# Patient Record
Sex: FEMALE | Race: WHITE | NOT HISPANIC OR LATINO
[De-identification: names, ages, dates, MRNs, and addresses within clinical notes are randomized per-mention and may not be internally consistent; named-entity substitution may affect disease eponyms.]

---

## 2017-01-27 ENCOUNTER — APPOINTMENT (OUTPATIENT)
Dept: SLEEP CENTER | Facility: CLINIC | Age: 37
End: 2017-01-27

## 2017-05-02 ENCOUNTER — APPOINTMENT (OUTPATIENT)
Dept: PULMONOLOGY | Facility: CLINIC | Age: 37
End: 2017-05-02

## 2017-05-02 VITALS
WEIGHT: 199 LBS | BODY MASS INDEX: 36.62 KG/M2 | HEART RATE: 108 BPM | RESPIRATION RATE: 17 BRPM | SYSTOLIC BLOOD PRESSURE: 110 MMHG | DIASTOLIC BLOOD PRESSURE: 70 MMHG | HEIGHT: 62 IN | OXYGEN SATURATION: 98 %

## 2017-05-02 DIAGNOSIS — Z72.820 SLEEP DEPRIVATION: ICD-10-CM

## 2017-11-07 ENCOUNTER — APPOINTMENT (OUTPATIENT)
Dept: PULMONOLOGY | Facility: CLINIC | Age: 37
End: 2017-11-07

## 2017-11-19 ENCOUNTER — RX RENEWAL (OUTPATIENT)
Age: 37
End: 2017-11-19

## 2018-03-02 ENCOUNTER — TRANSCRIPTION ENCOUNTER (OUTPATIENT)
Age: 38
End: 2018-03-02

## 2018-07-10 ENCOUNTER — TRANSCRIPTION ENCOUNTER (OUTPATIENT)
Age: 38
End: 2018-07-10

## 2018-07-25 ENCOUNTER — TRANSCRIPTION ENCOUNTER (OUTPATIENT)
Age: 38
End: 2018-07-25

## 2018-10-30 ENCOUNTER — TRANSCRIPTION ENCOUNTER (OUTPATIENT)
Age: 38
End: 2018-10-30

## 2019-01-25 ENCOUNTER — TRANSCRIPTION ENCOUNTER (OUTPATIENT)
Age: 39
End: 2019-01-25

## 2019-04-02 ENCOUNTER — TRANSCRIPTION ENCOUNTER (OUTPATIENT)
Age: 39
End: 2019-04-02

## 2019-09-18 ENCOUNTER — TRANSCRIPTION ENCOUNTER (OUTPATIENT)
Age: 39
End: 2019-09-18

## 2019-09-26 ENCOUNTER — APPOINTMENT (OUTPATIENT)
Dept: PULMONOLOGY | Facility: CLINIC | Age: 39
End: 2019-09-26
Payer: COMMERCIAL

## 2019-09-26 VITALS
DIASTOLIC BLOOD PRESSURE: 82 MMHG | SYSTOLIC BLOOD PRESSURE: 136 MMHG | RESPIRATION RATE: 17 BRPM | WEIGHT: 214 LBS | OXYGEN SATURATION: 98 % | BODY MASS INDEX: 39.38 KG/M2 | HEART RATE: 102 BPM | HEIGHT: 62 IN

## 2019-09-26 PROCEDURE — 99214 OFFICE O/P EST MOD 30 MIN: CPT

## 2019-10-04 NOTE — HISTORY OF PRESENT ILLNESS
[FreeTextEntry1] : Ms. Champagne is a 39 year old female presenting to the office today for an acute visit. She has PMHx of allergies, asthma, GERD, and poor sleep.\par \par She reports not feeling well 2 weeks ago- had severe sore throat and cold like symptoms.\par Her 3 year old daughter was sick in the home. \par 1 week later she felt worse with cough, post nasal drip, fever, mucus, poor sleep, chest tightness and shortness of breath. She went to an urgent care- told she had bronchitis with touch of PNA.\par She was given a zpak, benzonotate and advised to use nebulizer. She finished abx on sunday. \par \par She feels about 85% better but still has green mucus and cough. The cough still keeps her up at night despite benzonatate. She reports she is persistently clearing her throat/feels like there is mucus there. She has had some wheezing on and off. Her SOB is still present and is mainly with exertion. She is dealing with chest tightness still. \par \par Regarding her CERVANTES, she admits that with exercise she is symptomatic even when not sick. She tends to get breathy and her chest feels tight when exercising or pushing herself. \par \par She does not routinely use inhalers. She has not been using her rescue inhaler recently or with exercise.\par \par She otherwise denies heart burn, sour taste, sore throat, ear pain, n/v/abdominal pain, HA, sinus pressure or rashes.

## 2019-10-04 NOTE — ASSESSMENT
[FreeTextEntry1] : The plan for the patient is as follows:\par \par problem 1: asthmatic bronchitis with improvement\par -s/p abx\par -restart Singulair 10 mg before bed\par -Breo 200 1 puff once daily rinse and gargle (sample given to patient); she reports cost of inhalers in past were very expensive- will send in symbicort 160 2 BID once done with breo due to 12 month free coupon. \par -albuterol and ProAir PRN SOB and prior to exercise\par -advised she should be on a maintenance inhaler given her daily symptoms\par \par problem 2: allergies and sinuses-stable at this time\par -continue to use nasal saline\par -continue to use Xyzal 5 mg before bed \par -Environmental measures for allergies were encouraged including mattress and pillow cover, air purifier, and environmental controls. \par \par problem 3: GERD hx with globus complaints/LPR\par -start famotadine 40 mg PO QHS\par -Rule of 2s: avoid eating too much, eating too late, eating too spicy, eating two hours before bed\par \par problem 4: overweight\par -Weight loss, exercise, and diet control were discussed and are highly encouraged. Treatment options were given such as, aqua therapy, and contacting a nutritionist. Recommended to use the elliptical, stationary bike, less use of treadmill.  Obesity is associated with worsening asthma, shortness of breath, and potential for cardiac disease, diabetes, and other underlying medical conditions. \par \par problem 5: elevated HR\par -102 bpm today- denies recent coffee intake and is not anxious\par -prior vitals hx with elevated HR\par - recommend getting an spO2 monitor or counting her bpm especially with cardiovascular exercise; good to know baseline HR as well\par -given recent issues with HTN as well, logical to go for cardiology evaluation- Dr. Weinberg info given \par \par F/U in 2 months with [facility]\par call with update if not improving\par She is encouraged to call with any changes, concerns or questions. \par

## 2019-10-04 NOTE — REVIEW OF SYSTEMS
[Fatigue] : fatigue [Ear Disturbance] : no ear disturbance [Nasal Congestion] : no nasal congestion [Postnasal Drip] : no postnasal drip [Sore Throat] : no sore throat [Dry Mouth] : no dry mouth [Cough] : cough [Sputum] : sputum  [Dyspnea] : dyspnea [Chest Tightness] : chest tightness [Wheezing] : wheezing [As Noted in HPI] : as noted in HPI [Difficulty Initiating Sleep] : difficulty falling asleep [Nonrestorative Sleep] : nonrestorative sleep [Difficulty Maintaining Sleep] : difficulty maintaining sleep [Negative] : Cardiovascular [FreeTextEntry2] : persistent throat clearing [de-identified] : due to cough [FreeTextEntry9] : htn- controlled on meds: ramipril and amlodipine

## 2019-10-04 NOTE — PHYSICAL EXAM
[General Appearance - Well Developed] : well developed [Normal Appearance] : normal appearance [Well Groomed] : well groomed [General Appearance - Well Nourished] : well nourished [General Appearance - In No Acute Distress] : no acute distress [No Deformities] : no deformities [Normal Conjunctiva] : the conjunctiva exhibited no abnormalities [Eyelids - No Xanthelasma] : the eyelids demonstrated no xanthelasmas [Normal Oropharynx] : normal oropharynx [II] : II [Neck Appearance] : the appearance of the neck was normal [Neck Cervical Mass (___cm)] : no neck mass was observed [Jugular Venous Distention Increased] : there was no jugular-venous distention [Heart Sounds] : normal S1 and S2 [Murmurs] : no murmurs present [Respiration, Rhythm And Depth] : normal respiratory rhythm and effort [Exaggerated Use Of Accessory Muscles For Inspiration] : no accessory muscle use [Auscultation Breath Sounds / Voice Sounds] : lungs were clear to auscultation bilaterally [Abdomen Tenderness] : non-tender [Abdomen Soft] : soft [Abdomen Mass (___ Cm)] : no abdominal mass palpated [Gait - Sufficient For Exercise Testing] : the gait was sufficient for exercise testing [Abnormal Walk] : normal gait [Nail Clubbing] : no clubbing of the fingernails [Cyanosis, Localized] : no localized cyanosis [Petechial Hemorrhages (___cm)] : no petechial hemorrhages [Skin Color & Pigmentation] : normal skin color and pigmentation [] : no rash [No Focal Deficits] : no focal deficits [Oriented To Time, Place, And Person] : oriented to person, place, and time [Affect] : the affect was normal [Impaired Insight] : insight and judgment were intact [FreeTextEntry1] : I:E 1:3; Scant expiratory wheezes throughout

## 2019-10-08 ENCOUNTER — NON-APPOINTMENT (OUTPATIENT)
Age: 39
End: 2019-10-08

## 2019-10-08 ENCOUNTER — APPOINTMENT (OUTPATIENT)
Dept: CARDIOLOGY | Facility: CLINIC | Age: 39
End: 2019-10-08
Payer: COMMERCIAL

## 2019-10-08 VITALS
SYSTOLIC BLOOD PRESSURE: 144 MMHG | HEIGHT: 62 IN | BODY MASS INDEX: 38.46 KG/M2 | HEART RATE: 91 BPM | DIASTOLIC BLOOD PRESSURE: 93 MMHG | WEIGHT: 209 LBS | OXYGEN SATURATION: 15 %

## 2019-10-08 PROCEDURE — 99242 OFF/OP CONSLTJ NEW/EST SF 20: CPT

## 2019-10-08 PROCEDURE — 93000 ELECTROCARDIOGRAM COMPLETE: CPT

## 2019-10-21 ENCOUNTER — RX RENEWAL (OUTPATIENT)
Age: 39
End: 2019-10-21

## 2019-11-14 ENCOUNTER — TRANSCRIPTION ENCOUNTER (OUTPATIENT)
Age: 39
End: 2019-11-14

## 2019-11-25 ENCOUNTER — APPOINTMENT (OUTPATIENT)
Dept: CARDIOLOGY | Facility: CLINIC | Age: 39
End: 2019-11-25
Payer: COMMERCIAL

## 2019-11-25 ENCOUNTER — APPOINTMENT (OUTPATIENT)
Dept: PULMONOLOGY | Facility: CLINIC | Age: 39
End: 2019-11-25
Payer: COMMERCIAL

## 2019-11-25 VITALS
BODY MASS INDEX: 38.64 KG/M2 | HEIGHT: 62 IN | DIASTOLIC BLOOD PRESSURE: 100 MMHG | WEIGHT: 210 LBS | RESPIRATION RATE: 16 BRPM | SYSTOLIC BLOOD PRESSURE: 150 MMHG | HEART RATE: 99 BPM

## 2019-11-25 VITALS
OXYGEN SATURATION: 99 % | WEIGHT: 210 LBS | DIASTOLIC BLOOD PRESSURE: 102 MMHG | SYSTOLIC BLOOD PRESSURE: 147 MMHG | RESPIRATION RATE: 16 BRPM | HEIGHT: 62 IN | HEART RATE: 92 BPM | BODY MASS INDEX: 38.64 KG/M2

## 2019-11-25 PROCEDURE — 99214 OFFICE O/P EST MOD 30 MIN: CPT | Mod: 25

## 2019-11-25 PROCEDURE — 99213 OFFICE O/P EST LOW 20 MIN: CPT | Mod: 25

## 2019-11-25 PROCEDURE — 94060 EVALUATION OF WHEEZING: CPT

## 2019-11-25 PROCEDURE — 93015 CV STRESS TEST SUPVJ I&R: CPT

## 2019-11-25 PROCEDURE — 93306 TTE W/DOPPLER COMPLETE: CPT

## 2019-11-25 PROCEDURE — 94726 PLETHYSMOGRAPHY LUNG VOLUMES: CPT

## 2019-11-25 PROCEDURE — 94729 DIFFUSING CAPACITY: CPT

## 2019-11-25 RX ORDER — TELMISARTAN 80 MG/1
80 TABLET ORAL
Qty: 90 | Refills: 1 | Status: COMPLETED | COMMUNITY
Start: 2019-10-08 | End: 2019-11-25

## 2019-11-25 NOTE — HISTORY OF PRESENT ILLNESS
[FreeTextEntry1] : Ms. Champagne is a 39 year old female presenting to the office today for a follow up visit. She has PMHx of allergies, asthma, GERD, and poor sleep.\par \par Pt has been doing better over all with GERD symptoms and Asthma symptoms overall since starting symbicort however she continues to find herself SOB with exercise and when exerting herself during chores. \par \par She states the xopenex was not covered and she does not have a rescue inhaler. \par She feels post nasal drip and has persistent throat clearing. She feels she does have some underlying GI issues and needs to have an evaluation. She does admit to GERD like symptoms in the evening. \par She had a couple episodes of diarrhea and vomiting not attributed to illness or new foods. \par \par She states that she has a persistent annoying cough from the mucus or post nasal drip. \par \par Her BP meds have been changed since seeing Dr. Weinberg. \par She has an appointment today as well. \par She has not yet purchased a pulse oximeter. \par She otherwise denies any other complaints. \par She does not feel sick. \par She uses Flonase on and off and sometimes uses Benadryl for post nasal/sinus symptoms.

## 2019-11-25 NOTE — PHYSICAL EXAM
[Normal Appearance] : normal appearance [General Appearance - Well Developed] : well developed [Well Groomed] : well groomed [General Appearance - Well Nourished] : well nourished [No Deformities] : no deformities [General Appearance - In No Acute Distress] : no acute distress [Eyelids - No Xanthelasma] : the eyelids demonstrated no xanthelasmas [Normal Conjunctiva] : the conjunctiva exhibited no abnormalities [II] : II [Normal Oropharynx] : normal oropharynx [Neck Appearance] : the appearance of the neck was normal [Neck Cervical Mass (___cm)] : no neck mass was observed [Jugular Venous Distention Increased] : there was no jugular-venous distention [Heart Rate And Rhythm] : heart rate and rhythm were normal [Heart Sounds] : normal S1 and S2 [Murmurs] : no murmurs present [Respiration, Rhythm And Depth] : normal respiratory rhythm and effort [Auscultation Breath Sounds / Voice Sounds] : lungs were clear to auscultation bilaterally [Exaggerated Use Of Accessory Muscles For Inspiration] : no accessory muscle use [Abdomen Soft] : soft [FreeTextEntry1] : I:E 1:3;  [Abdomen Tenderness] : non-tender [Abdomen Mass (___ Cm)] : no abdominal mass palpated [Abnormal Walk] : normal gait [Gait - Sufficient For Exercise Testing] : the gait was sufficient for exercise testing [Nail Clubbing] : no clubbing of the fingernails [Petechial Hemorrhages (___cm)] : no petechial hemorrhages [Cyanosis, Localized] : no localized cyanosis [No Focal Deficits] : no focal deficits [] : no rash [Skin Color & Pigmentation] : normal skin color and pigmentation [Oriented To Time, Place, And Person] : oriented to person, place, and time [Impaired Insight] : insight and judgment were intact [Affect] : the affect was normal

## 2019-11-25 NOTE — REVIEW OF SYSTEMS
[Ear Disturbance] : no ear disturbance [Nasal Congestion] : nasal congestion [Postnasal Drip] : postnasal drip [Sore Throat] : no sore throat [Cough] : cough [Dry Mouth] : no dry mouth [Chest Tightness] : no chest tightness [Dyspnea] : dyspnea [Wheezing] : no wheezing [Indigestion] : indigestion [Reflux] : reflux [Vomiting] : vomiting [As Noted in HPI] : as noted in HPI [Abdominal Pain] : abdominal pain [Diarrhea] : diarrhea [Difficulty Initiating Sleep] : no difficulty falling asleep [Difficulty Maintaining Sleep] : no difficulty maintaining sleep [Nonrestorative Sleep] : restorative sleep [Negative] : Pulmonary Hypertension [FreeTextEntry2] : persistent throat clearing

## 2019-11-25 NOTE — ASSESSMENT
[FreeTextEntry1] : The plan for the patient is as follows:\par \par problem 1: Asthma with continues exertional symptoms\par -Singulair 10 mg before bed\par -Continue symbicort 160 2 BID rinse and gargle\par -add Spiriva respimat 2.5 2 puffs in am (sample given to patient as well)\par -try ProAir PRN SOB and prior to exercise, monitor HR, may need xopenex\par \par problem 2: post nasal drip, sinus congestion, + turbinate hypertrophy, contributing to cough\par -continue to use nasal saline\par -continue to use Xyzal 5 mg before bed \par -restart fluticasone nasal spray 2 sprays in am\par -add azelastine nasal spray 2 sprays am and pm\par \par problem 3: GERD hx with globus complaints/LPR- contributing to cough\par -add omeprazole 40 mg PO QHS\par -continue famotadine 40 mg PO QHS\par -Rule of 2s: avoid eating too much, eating too late, eating too spicy, eating two hours before bed\par \par problem 4: overweight\par -Weight loss, exercise, and diet control were discussed and are highly encouraged. Treatment options were given such as, aqua therapy, and contacting a nutritionist. Recommended to use the elliptical, stationary bike, less use of treadmill.  Obesity is associated with worsening asthma, shortness of breath, and potential for cardiac disease, diabetes, and other underlying medical conditions. \par \par problem 5: elevated HR hx; follows with Dr. Weinberg\par - recommend getting an spO2 monitor or counting her bpm especially with cardiovascular exercise; good to know baseline HR as well\par -f/u cardiology evaluation today\par \par F/U in 4-6 months\par She is encouraged to call with any changes, concerns or questions. \par

## 2019-11-25 NOTE — REASON FOR VISIT
[Acute] : an acute visit [Asthma] : asthma [Cough] : cough [Shortness of Breath] : shortness of Breath [Wheezing] : wheezing [FreeTextEntry2] : GERD

## 2019-12-23 ENCOUNTER — TRANSCRIPTION ENCOUNTER (OUTPATIENT)
Age: 39
End: 2019-12-23

## 2020-01-06 ENCOUNTER — RX RENEWAL (OUTPATIENT)
Age: 40
End: 2020-01-06

## 2020-01-08 ENCOUNTER — NON-APPOINTMENT (OUTPATIENT)
Age: 40
End: 2020-01-08

## 2020-01-08 ENCOUNTER — APPOINTMENT (OUTPATIENT)
Dept: CARDIOLOGY | Facility: CLINIC | Age: 40
End: 2020-01-08
Payer: COMMERCIAL

## 2020-01-08 VITALS
WEIGHT: 217 LBS | SYSTOLIC BLOOD PRESSURE: 130 MMHG | HEIGHT: 62 IN | BODY MASS INDEX: 39.93 KG/M2 | DIASTOLIC BLOOD PRESSURE: 90 MMHG | RESPIRATION RATE: 16 BRPM

## 2020-01-08 PROCEDURE — 99214 OFFICE O/P EST MOD 30 MIN: CPT

## 2020-01-08 RX ORDER — AMLODIPINE BESYLATE 5 MG/1
5 TABLET ORAL DAILY
Qty: 90 | Refills: 1 | Status: COMPLETED | COMMUNITY
Start: 2019-11-25 | End: 2020-01-08

## 2020-01-10 ENCOUNTER — TRANSCRIPTION ENCOUNTER (OUTPATIENT)
Age: 40
End: 2020-01-10

## 2020-01-10 RX ORDER — LEVALBUTEROL TARTRATE 45 UG/1
45 AEROSOL, METERED ORAL
Qty: 1 | Refills: 3 | Status: COMPLETED | COMMUNITY
Start: 2019-09-26 | End: 2020-01-10

## 2020-01-16 ENCOUNTER — RX RENEWAL (OUTPATIENT)
Age: 40
End: 2020-01-16

## 2020-01-29 ENCOUNTER — TRANSCRIPTION ENCOUNTER (OUTPATIENT)
Age: 40
End: 2020-01-29

## 2020-02-04 ENCOUNTER — APPOINTMENT (OUTPATIENT)
Dept: PULMONOLOGY | Facility: CLINIC | Age: 40
End: 2020-02-04
Payer: COMMERCIAL

## 2020-02-04 VITALS
RESPIRATION RATE: 17 BRPM | SYSTOLIC BLOOD PRESSURE: 120 MMHG | HEART RATE: 120 BPM | OXYGEN SATURATION: 98 % | DIASTOLIC BLOOD PRESSURE: 70 MMHG | HEIGHT: 62 IN | BODY MASS INDEX: 39.04 KG/M2 | WEIGHT: 212.13 LBS

## 2020-02-04 PROCEDURE — 71046 X-RAY EXAM CHEST 2 VIEWS: CPT

## 2020-02-04 PROCEDURE — 99214 OFFICE O/P EST MOD 30 MIN: CPT

## 2020-02-04 NOTE — REASON FOR VISIT
[Asthma] : asthma [Cough] : cough [Shortness of Breath] : shortness of Breath [Wheezing] : wheezing [Acute] : an acute visit [FreeTextEntry2] : GERD

## 2020-02-04 NOTE — REVIEW OF SYSTEMS
[Fatigue] : fatigue [Fever] : fever [Nasal Congestion] : nasal congestion [Ear Disturbance] : ear disturbance [Poor Appetite] : poor appetite [Sore Throat] : sore throat [Cough] : cough [Sputum] : sputum  [Chest Tightness] : chest tightness [Dyspnea] : dyspnea [Wheezing] : wheezing [Nausea] : nausea [Reflux] : reflux [Indigestion] : indigestion [As Noted in HPI] : as noted in HPI [Headache] : headache [Difficulty Initiating Sleep] : difficulty falling asleep [Difficulty Maintaining Sleep] : difficulty maintaining sleep [Nonrestorative Sleep] : nonrestorative sleep [Negative] : Pulmonary Hypertension [Dry Mouth] : no dry mouth [FreeTextEntry2] : persistent throat clearing

## 2020-02-04 NOTE — HISTORY OF PRESENT ILLNESS
[FreeTextEntry1] : Ms. Champagne is a 39 year old female presenting to the office today for a sick visit. She has PMHx of allergies, asthma, GERD, and poor sleep/mild sleep apnea. \par \par She reports not feeling well 1 week ago- had severe sore throat and cold like symptoms.\par Her 3 year old daughter was sick in the home with double ear infection, URI and Pink eye a few days earlier and was taking care of her. \par By Tuesday, pt reports severe sore throat. On Wednesday she went to urgent care and was negative for flu and strep. She had 102 fever at visit and was extremely dehydrated due to no PO intake.  She was given Tamiflu and told to rest, hydrate and monitor symptoms. \par By Saturday, pt was progressively worsening, she developed cough, SOB at rest and CERVANTES, wheezing, chest tightness, mucus and felt extremely fatigued. Her fevers persisted. She went back to urgent care- she was given a a Z-Andrew. \par \par At this point, she has some relief of the throat pain but all the other symptoms persist. \par She has nasal congestion and HA as well. She has minimal post nasal drip. \par She cannot sleep due to the cough.\par Despite PPI and Pepcid, she continues to have heartburn and reflux. \par She is compliant on her inhalers and fluticasone.\par She is using tylenol for throat pain and fever. She continues to have low grade fever. \par

## 2020-02-04 NOTE — PHYSICAL EXAM
[General Appearance - Well Developed] : well developed [Well Groomed] : well groomed [Normal Appearance] : normal appearance [General Appearance - Well Nourished] : well nourished [General Appearance - In No Acute Distress] : no acute distress [No Deformities] : no deformities [Eyelids - No Xanthelasma] : the eyelids demonstrated no xanthelasmas [Normal Oropharynx] : normal oropharynx [Normal Conjunctiva] : the conjunctiva exhibited no abnormalities [II] : II [Neck Cervical Mass (___cm)] : no neck mass was observed [Neck Appearance] : the appearance of the neck was normal [Jugular Venous Distention Increased] : there was no jugular-venous distention [Heart Rate And Rhythm] : heart rate and rhythm were normal [Heart Sounds] : normal S1 and S2 [Murmurs] : no murmurs present [Exaggerated Use Of Accessory Muscles For Inspiration] : no accessory muscle use [Respiration, Rhythm And Depth] : normal respiratory rhythm and effort [Abdomen Soft] : soft [Abnormal Walk] : normal gait [Nail Clubbing] : no clubbing of the fingernails [Gait - Sufficient For Exercise Testing] : the gait was sufficient for exercise testing [Cyanosis, Localized] : no localized cyanosis [Petechial Hemorrhages (___cm)] : no petechial hemorrhages [] : no rash [No Focal Deficits] : no focal deficits [Skin Color & Pigmentation] : normal skin color and pigmentation [Oriented To Time, Place, And Person] : oriented to person, place, and time [Affect] : the affect was normal [Impaired Insight] : insight and judgment were intact [FreeTextEntry1] : I:E 1:3; LLL with rhonci, RL with scattered expiratory wheeze, cannot take a deep breath without coughing.

## 2020-02-04 NOTE — ASSESSMENT
[FreeTextEntry1] : The plan for the patient is as follows:\par \par problem 1: LLL infiltrate/PNA\par -add Augmentin 875 BID x 10 days\par -finish zpak\par \par problem 2: asthma exacerbation due to the above\par -Singulair 10 mg before bed\par -Continue symbicort 160 2 BID rinse and gargle\par -add Spiriva respimat 2.5 2 puffs in am (sample given to patient as well)\par -try ProAir PRN SOB and prior to exercise, monitor HR, may need xopenex\par \par problem 3: sinus congestion, + turbinate hypertrophy\par -continue to use nasal saline\par -continue to use Xyzal 5 mg before bed \par -hold fluticasone \par -use Xhance 1 spray am and pm (sample given)\par \par problem 4: GERD hx with globus complaints/LPR- contributing to cough\par -continue omeprazole 40 mg PO 30 min before breakfast and 30 min before dinner x 1 week\par -continue famotadine 40 mg PO QHS\par -Rule of 2s: avoid eating too much, eating too late, eating too spicy, eating two hours before bed\par -pt to see GI next given continued symptoms \par \par problem 5: throat pain\par -add medrol dose pack to relieve pain/decrease inflammation\par \par problem 6: cough with poor sleep\par -hycodan cough syrup 5-10 ml QHS \par \par \par F/U in 4-6 months\par She is encouraged to call with any changes, concerns or questions. \par She was encouraged to call if symptoms were not resolving for her by next week.

## 2020-02-05 ENCOUNTER — TRANSCRIPTION ENCOUNTER (OUTPATIENT)
Age: 40
End: 2020-02-05

## 2020-02-12 ENCOUNTER — APPOINTMENT (OUTPATIENT)
Dept: CARDIOLOGY | Facility: CLINIC | Age: 40
End: 2020-02-12
Payer: COMMERCIAL

## 2020-02-12 VITALS
RESPIRATION RATE: 16 BRPM | SYSTOLIC BLOOD PRESSURE: 125 MMHG | WEIGHT: 210 LBS | DIASTOLIC BLOOD PRESSURE: 82 MMHG | HEART RATE: 88 BPM | HEIGHT: 62 IN | BODY MASS INDEX: 38.64 KG/M2

## 2020-02-12 PROCEDURE — 93924 LWR XTR VASC STDY BILAT: CPT

## 2020-02-12 PROCEDURE — 93224 XTRNL ECG REC UP TO 48 HRS: CPT

## 2020-02-12 PROCEDURE — 99213 OFFICE O/P EST LOW 20 MIN: CPT

## 2020-02-13 RX ORDER — AZELASTINE HYDROCHLORIDE 137 UG/1
0.1 SPRAY, METERED NASAL TWICE DAILY
Qty: 1 | Refills: 0 | Status: COMPLETED | COMMUNITY
Start: 2019-11-25 | End: 2020-02-13

## 2020-02-13 RX ORDER — AMOXICILLIN AND CLAVULANATE POTASSIUM 875; 125 MG/1; MG/1
875-125 TABLET, COATED ORAL
Qty: 20 | Refills: 0 | Status: COMPLETED | COMMUNITY
Start: 2020-02-04 | End: 2020-02-13

## 2020-02-24 ENCOUNTER — NON-APPOINTMENT (OUTPATIENT)
Age: 40
End: 2020-02-24

## 2020-04-27 ENCOUNTER — APPOINTMENT (OUTPATIENT)
Dept: PULMONOLOGY | Facility: CLINIC | Age: 40
End: 2020-04-27
Payer: COMMERCIAL

## 2020-04-27 ENCOUNTER — APPOINTMENT (OUTPATIENT)
Dept: CARDIOLOGY | Facility: CLINIC | Age: 40
End: 2020-04-27
Payer: COMMERCIAL

## 2020-04-27 VITALS
WEIGHT: 210 LBS | HEIGHT: 62 IN | DIASTOLIC BLOOD PRESSURE: 82 MMHG | BODY MASS INDEX: 38.64 KG/M2 | SYSTOLIC BLOOD PRESSURE: 128 MMHG | HEART RATE: 80 BPM | RESPIRATION RATE: 15 BRPM

## 2020-04-27 VITALS
TEMPERATURE: 98.5 F | OXYGEN SATURATION: 98 % | RESPIRATION RATE: 17 BRPM | WEIGHT: 211 LBS | DIASTOLIC BLOOD PRESSURE: 70 MMHG | SYSTOLIC BLOOD PRESSURE: 120 MMHG | HEART RATE: 104 BPM | BODY MASS INDEX: 38.83 KG/M2 | HEIGHT: 62 IN

## 2020-04-27 DIAGNOSIS — J45.901 UNSPECIFIED ASTHMA WITH (ACUTE) EXACERBATION: ICD-10-CM

## 2020-04-27 DIAGNOSIS — Z87.09 PERSONAL HISTORY OF OTHER DISEASES OF THE RESPIRATORY SYSTEM: ICD-10-CM

## 2020-04-27 PROCEDURE — 99213 OFFICE O/P EST LOW 20 MIN: CPT

## 2020-04-27 PROCEDURE — 99214 OFFICE O/P EST MOD 30 MIN: CPT

## 2020-04-27 RX ORDER — ALBUTEROL SULFATE 90 UG/1
108 (90 BASE) AEROSOL, METERED RESPIRATORY (INHALATION)
Qty: 1 | Refills: 3 | Status: DISCONTINUED | COMMUNITY
Start: 2019-11-25 | End: 2020-04-27

## 2020-04-27 RX ORDER — HYDROCODONE BITARTRATE AND HOMATROPINE METHYLBROMIDE 5; 1.5 MG/5ML; MG/5ML
5-1.5 SYRUP ORAL EVERY 6 HOURS
Qty: 240 | Refills: 0 | Status: DISCONTINUED | COMMUNITY
Start: 2020-02-04 | End: 2020-04-27

## 2020-04-27 NOTE — PHYSICAL EXAM
[Normal Appearance] : normal appearance [General Appearance - Well Developed] : well developed [Well Groomed] : well groomed [General Appearance - Well Nourished] : well nourished [No Deformities] : no deformities [General Appearance - In No Acute Distress] : no acute distress [Eyelids - No Xanthelasma] : the eyelids demonstrated no xanthelasmas [Normal Conjunctiva] : the conjunctiva exhibited no abnormalities [Normal Oropharynx] : normal oropharynx [II] : II [Neck Cervical Mass (___cm)] : no neck mass was observed [Neck Appearance] : the appearance of the neck was normal [Jugular Venous Distention Increased] : there was no jugular-venous distention [Heart Rate And Rhythm] : heart rate and rhythm were normal [Heart Sounds] : normal S1 and S2 [Murmurs] : no murmurs present [Respiration, Rhythm And Depth] : normal respiratory rhythm and effort [Exaggerated Use Of Accessory Muscles For Inspiration] : no accessory muscle use [Auscultation Breath Sounds / Voice Sounds] : lungs were clear to auscultation bilaterally [Gait - Sufficient For Exercise Testing] : the gait was sufficient for exercise testing [Abdomen Soft] : soft [Abnormal Walk] : normal gait [Cyanosis, Localized] : no localized cyanosis [Petechial Hemorrhages (___cm)] : no petechial hemorrhages [Nail Clubbing] : no clubbing of the fingernails [] : no rash [Skin Color & Pigmentation] : normal skin color and pigmentation [No Focal Deficits] : no focal deficits [Oriented To Time, Place, And Person] : oriented to person, place, and time [Impaired Insight] : insight and judgment were intact [Affect] : the affect was normal

## 2020-04-27 NOTE — ASSESSMENT
[FreeTextEntry1] : The plan for the patient is as follows:\par \par problem 1: asthma\par -Singulair 10 mg before bed\par -Continue symbicort 160 2 BID rinse and gargle after use\par -add Spiriva respimat 2.5 2 puffs in am (coupon given to patient)\par -try ProAir PRN SOB and prior to exercise\par \par problem 2: hx of allergies, intermittent post nasal drip\par -continue to use nasal saline\par -continue xhance daily\par \par problem 3: GERD hx with intermittent episodes\par -continue omeprazole 40 mg PO 30 min before breakfast and 30 min before dinner x 1 week\par -continue famotadine 40 mg PO QHS\par -Rule of 2s: avoid eating too much, eating too late, eating too spicy, eating two hours before bed- discussed in detail\par -pt to see GI next given continued symptoms\par \par problem 4:Mild sleep apnea\par -needs weight loss\par -discussed Provent\par -can try Theravent (OTC)\par -does not want to move forward with PAP therapy\par -did not due well with oral appliance before\par \par F/U in 4-6 months\par She is encouraged to call with any changes, concerns or questions. \par She was encouraged to call if symptoms were not resolving for her by next week.

## 2020-04-27 NOTE — REASON FOR VISIT
[Acute] : an acute visit [Follow-Up] : a follow-up visit [Shortness of Breath] : shortness of Breath [Asthma] : asthma [Cough] : cough [Wheezing] : wheezing [FreeTextEntry2] : GERD

## 2020-04-27 NOTE — HISTORY OF PRESENT ILLNESS
[FreeTextEntry1] : Ms. Champagne is a 39 year old female presenting to the office today for a sick visit. She has PMHx of allergies, asthma, GERD, and poor sleep/mild sleep apnea. \par \par She reports not feeling well 1 week ago- had severe sore throat and cold like symptoms.\par Her 3 year old daughter was sick in the home with double ear infection, URI and Pink eye a few days earlier and was taking care of her. \par By Tuesday, pt reports severe sore throat. On Wednesday she went to urgent care and was negative for flu and strep. She had 102 fever at visit and was extremely dehydrated due to no PO intake.  She was given Tamiflu and told to rest, hydrate and monitor symptoms. \par By Saturday, pt was progressively worsening, she developed cough, SOB at rest and CERVANTES, wheezing, chest tightness, mucus and felt extremely fatigued. Her fevers persisted. She went back to urgent care- she was given a a Z-Andrew. \par \par At this point, she has some relief of the throat pain but all the other symptoms persist. \par She has nasal congestion and HA as well. She has minimal post nasal drip. \par She cannot sleep due to the cough.\par Despite PPI and Pepcid, she continues to have heartburn and reflux. \par She is compliant on her inhalers and fluticasone.\par She is using Tylenol for throat pain and fever. She continues to have low grade fever. \par \par F/U visit 4/27/2020:\par \par Pt is in for routine follow up. \par She reports she is in her her normal state of health. \par She states that she quit her job and feels that this has helped her overall considering there is less stress in her life and less exposure to germs. \par \par She reports she deals with exertional SOB- especially with stairs. \par She is compliant on her Symbicort. \par She is trying to exercise- has not gotten into any routine with it yet.\par She states she does still tend to deal with reflux/heartburn 2-3 times a week despite PPI and famotidine. She knows diet changes and when she eats needs to be changed also.\par \par She has had some allergy issues and has needed Benadryl a few times but has otherwise been ok. \par She has mild sleep apnea- she bought something off [employer] to help with snoring. She ends up pulling it off. \par She does not want to try PAP therapy she feels she would be too uncomfortable. \par She knows losing weight may help. Sleep symptoms include: snoring, un refreshed from sleep, fragmented sleep and daytime sleepiness. She does occasionally get morning headaches. \par

## 2020-04-27 NOTE — REVIEW OF SYSTEMS
[Fever] : no fever [Ear Disturbance] : no ear disturbance [Poor Appetite] : normal appetite  [Fatigue] : no fatigue [Nasal Congestion] : no nasal congestion [Postnasal Drip] : no postnasal drip [Sore Throat] : no sore throat [Dry Mouth] : no dry mouth [Cough] : no cough [Dyspnea] : dyspnea [Chest Tightness] : no chest tightness [Sputum] : not coughing up ~M sputum [Wheezing] : no wheezing [Reflux] : reflux [Indigestion] : indigestion [Headache] : headache [Nausea] : nausea [Difficulty Initiating Sleep] : difficulty falling asleep [As Noted in HPI] : as noted in HPI [Difficulty Maintaining Sleep] : difficulty maintaining sleep [Nonrestorative Sleep] : nonrestorative sleep [Negative] : Endocrine [FreeTextEntry2] : persistent throat clearing

## 2020-05-18 ENCOUNTER — APPOINTMENT (OUTPATIENT)
Dept: GASTROENTEROLOGY | Facility: CLINIC | Age: 40
End: 2020-05-18
Payer: COMMERCIAL

## 2020-05-18 VITALS
SYSTOLIC BLOOD PRESSURE: 120 MMHG | BODY MASS INDEX: 39.2 KG/M2 | HEIGHT: 62 IN | DIASTOLIC BLOOD PRESSURE: 70 MMHG | WEIGHT: 213 LBS

## 2020-05-18 DIAGNOSIS — Z80.3 FAMILY HISTORY OF MALIGNANT NEOPLASM OF BREAST: ICD-10-CM

## 2020-05-18 PROCEDURE — 99204 OFFICE O/P NEW MOD 45 MIN: CPT | Mod: 95

## 2020-05-18 RX ORDER — INSULIN LISPRO 100 [IU]/ML
100 INJECTION, SOLUTION INTRAVENOUS; SUBCUTANEOUS
Refills: 0 | Status: DISCONTINUED | COMMUNITY
Start: 2020-01-10 | End: 2020-05-18

## 2020-05-18 RX ORDER — FAMOTIDINE 40 MG/1
40 TABLET, FILM COATED ORAL
Qty: 30 | Refills: 3 | Status: ACTIVE | COMMUNITY
Start: 2019-09-26 | End: 1900-01-01

## 2020-05-18 NOTE — ASSESSMENT
[FreeTextEntry1] : 1.  Chronic GERD with orolaryngopharyngeal changes--rule out erosive esophagitis, Patrick's, malignancy.  Obesity, DM, asthma, sleep apnea, some of her medicines all may contribute to reflux.\par 2.  Mild anemia--suspect GI source, superimposed on menses.\par 3.  Longstanding type 1 diabetes mellitus.\par 4.  Obesity.\par 5.  Asthma.\par 6.  Ex-smoker.\par 7.  Hyperlipidemia.\par 8.  Sleep apnea.\par 9.  Status post 2 C-sections, nasal septum repair.\par 10.  Allergic to shellfish, avocado, mold.\par \par Plan:\par 1.  Electronic medical records reviewed.\par 2.  Dietary and lifestyle modifications reviewed.  ASGE brochure on "GERD" to be mailed.\par 3.  Continue omeprazole 40 mg on awakening, famotidine 40 mg at bedtime.  After endoscopic evaluation, may consider increasing omeprazole to 40 mg AC twice daily.\par 4.  Schedule EGD--she is aware that these procedures are not being performed at this time because of coronavirus concerns. Procedure, rationale, alternatives, material risks, and anesthesia plan were reviewed and brochure to be mailed.\par 5.  If no cause of mild anemia is identified at EGD, then diagnostic colonoscopy will be advised.\par

## 2020-05-18 NOTE — REVIEW OF SYSTEMS
[Hoarseness] : hoarseness [Heartburn] : heartburn [Joint Pain] : joint pain [Arthralgias] : arthralgias [Joint Swelling] : joint swelling [Joint Stiffness] : joint stiffness [Sleep Disturbances] : sleep disturbances [Negative] : Heme/Lymph [As Noted in HPI] : as noted in HPI

## 2020-05-18 NOTE — REASON FOR VISIT
[Home] : at home, [unfilled] , at the time of the visit. [Patient] : the patient [Medical Office: ([facility])___] : at the medical office located in  [Self] : self [Consultation] : a consultation visit [FreeTextEntry1] : GERD [FreeTextEntry4] : Estefanía Nevarez

## 2020-05-18 NOTE — PHYSICAL EXAM

## 2020-05-18 NOTE — HISTORY OF PRESENT ILLNESS
[FreeTextEntry1] : 9:45 AM 5/18/2020.  Sharon has been having various GI issues since middle school, with GERD, intermittent vomiting and diarrhea.  While pregnant four years ago, she had worse GERD, for which she was given prescription ranitidine.  In the past year, she was felt to have upper airway changes secondary to reflux.  She sometimes awakens from a sound sleep with a bad cough.  For the past 6-12 months, she has been taking omeprazole 40 mg on awakening and famotidine 40 mg at bedtime.  She is utilizing nasal device for sleep apnea.  She was diagnosed with type 1 diabetes mellitus 14 years ago, has had two bouts of DKA, on an insulin pump.  Recent labs revealed mild anemia (Hgb 11.3/Hct 34.2).  She states that her menses have been regular and not heavy.  She denies weight loss, dysphagia, odynophagia, early satiety, abdominal pain, change in bowels, ASA/NSAID use, or blood in the stool.  Family history is negative for GI neoplasia.  She has never undergone endoscopic evaluation.

## 2020-05-18 NOTE — CONSULT LETTER
[Dear  ___] : Dear  [unfilled], [Please see my note below.] : Please see my note below. [Consult Letter:] : I had the pleasure of evaluating your patient, [unfilled]. [Consult Closing:] : Thank you very much for allowing me to participate in the care of this patient.  If you have any questions, please do not hesitate to contact me. [Sincerely,] : Sincerely, [DrTremayne  ___] : Dr. ZHENG [DrTremayne ___] : Dr. ZHENG [FreeTextEntry3] : Suresh Pastrana M.D.\par

## 2020-05-19 ENCOUNTER — NON-APPOINTMENT (OUTPATIENT)
Age: 40
End: 2020-05-19

## 2020-05-19 ENCOUNTER — APPOINTMENT (OUTPATIENT)
Dept: INTERNAL MEDICINE | Facility: CLINIC | Age: 40
End: 2020-05-19
Payer: COMMERCIAL

## 2020-05-19 VITALS — HEIGHT: 63.5 IN | BODY MASS INDEX: 37.1 KG/M2 | WEIGHT: 212 LBS

## 2020-05-19 DIAGNOSIS — Z34.90 ENCOUNTER FOR SUPERVISION OF NORMAL PREGNANCY, UNSPECIFIED, UNSPECIFIED TRIMESTER: ICD-10-CM

## 2020-05-19 DIAGNOSIS — Z82.49 FAMILY HISTORY OF ISCHEMIC HEART DISEASE AND OTHER DISEASES OF THE CIRCULATORY SYSTEM: ICD-10-CM

## 2020-05-19 DIAGNOSIS — Z83.438 FAMILY HISTORY OF OTHER DISORDER OF LIPOPROTEIN METABOLISM AND OTHER LIPIDEMIA: ICD-10-CM

## 2020-05-19 DIAGNOSIS — Z87.01 PERSONAL HISTORY OF PNEUMONIA (RECURRENT): ICD-10-CM

## 2020-05-19 DIAGNOSIS — Z87.09 PERSONAL HISTORY OF OTHER DISEASES OF THE RESPIRATORY SYSTEM: ICD-10-CM

## 2020-05-19 DIAGNOSIS — O34.219 MATERNAL CARE FOR UNSPECIFIED TYPE SCAR FROM PREVIOUS CESAREAN DELIVERY: ICD-10-CM

## 2020-05-19 DIAGNOSIS — Z84.89 FAMILY HISTORY OF OTHER SPECIFIED CONDITIONS: ICD-10-CM

## 2020-05-19 DIAGNOSIS — Z97.5 PRESENCE OF (INTRAUTERINE) CONTRACEPTIVE DEVICE: ICD-10-CM

## 2020-05-19 DIAGNOSIS — Z23 ENCOUNTER FOR IMMUNIZATION: ICD-10-CM

## 2020-05-19 PROCEDURE — 93000 ELECTROCARDIOGRAM COMPLETE: CPT

## 2020-05-19 PROCEDURE — 36415 COLL VENOUS BLD VENIPUNCTURE: CPT

## 2020-05-19 PROCEDURE — 99385 PREV VISIT NEW AGE 18-39: CPT | Mod: 25

## 2020-05-19 PROCEDURE — G0009: CPT

## 2020-05-19 PROCEDURE — 90732 PPSV23 VACC 2 YRS+ SUBQ/IM: CPT

## 2020-05-27 LAB
25(OH)D3 SERPL-MCNC: 12.9 NG/ML
APPEARANCE: CLEAR
BASOPHILS # BLD AUTO: 0.02 K/UL
BASOPHILS NFR BLD AUTO: 0.2 %
BILIRUBIN URINE: NEGATIVE
BLOOD URINE: NEGATIVE
COLOR: NORMAL
EOSINOPHIL # BLD AUTO: 0.15 K/UL
EOSINOPHIL NFR BLD AUTO: 1.7 %
FERRITIN SERPL-MCNC: 21 NG/ML
FOLATE SERPL-MCNC: 5.6 NG/ML
GLUCOSE QUALITATIVE U: NEGATIVE
HCT VFR BLD CALC: 34.5 %
HGB BLD-MCNC: 10.9 G/DL
IMM GRANULOCYTES NFR BLD AUTO: 0.2 %
IRON SATN MFR SERPL: 23 %
IRON SERPL-MCNC: 77 UG/DL
KETONES URINE: NEGATIVE
LEUKOCYTE ESTERASE URINE: NEGATIVE
LYMPHOCYTES # BLD AUTO: 1.8 K/UL
LYMPHOCYTES NFR BLD AUTO: 20.7 %
MAN DIFF?: NORMAL
MCHC RBC-ENTMCNC: 29.2 PG
MCHC RBC-ENTMCNC: 31.6 GM/DL
MCV RBC AUTO: 92.5 FL
MONOCYTES # BLD AUTO: 0.6 K/UL
MONOCYTES NFR BLD AUTO: 6.9 %
NEUTROPHILS # BLD AUTO: 6.11 K/UL
NEUTROPHILS NFR BLD AUTO: 70.3 %
NITRITE URINE: NEGATIVE
PH URINE: 5.5
PLATELET # BLD AUTO: 230 K/UL
PROTEIN URINE: NEGATIVE
RBC # BLD: 3.73 M/UL
RBC # FLD: 12.7 %
SPECIFIC GRAVITY URINE: 1.01
TIBC SERPL-MCNC: 338 UG/DL
UIBC SERPL-MCNC: 261 UG/DL
UROBILINOGEN URINE: NORMAL
VIT B12 SERPL-MCNC: 483 PG/ML
WBC # FLD AUTO: 8.7 K/UL

## 2020-05-29 LAB
HGB A MFR BLD: 97.5 %
HGB A2 MFR BLD: 2.5 %
HGB FRACT BLD-IMP: NORMAL

## 2020-06-01 RX ORDER — TELMISARTAN AND HYDROCHLOROTHIAZIDE 80; 25 MG/1; MG/1
80-25 TABLET ORAL
Qty: 90 | Refills: 1 | Status: DISCONTINUED | COMMUNITY
Start: 2019-11-25 | End: 2020-06-01

## 2020-06-26 ENCOUNTER — APPOINTMENT (OUTPATIENT)
Dept: INTERNAL MEDICINE | Facility: CLINIC | Age: 40
End: 2020-06-26
Payer: COMMERCIAL

## 2020-06-26 PROCEDURE — 36415 COLL VENOUS BLD VENIPUNCTURE: CPT

## 2020-06-30 LAB
ALBUMIN SERPL ELPH-MCNC: 4.1 G/DL
ALP BLD-CCNC: 58 U/L
ALT SERPL-CCNC: 5 U/L
ANION GAP SERPL CALC-SCNC: 14 MMOL/L
AST SERPL-CCNC: 15 U/L
BASOPHILS # BLD AUTO: 0.01 K/UL
BASOPHILS NFR BLD AUTO: 0.1 %
BILIRUB SERPL-MCNC: 0.3 MG/DL
BUN SERPL-MCNC: 18 MG/DL
CALCIUM SERPL-MCNC: 9.1 MG/DL
CHLORIDE SERPL-SCNC: 101 MMOL/L
CO2 SERPL-SCNC: 25 MMOL/L
CREAT SERPL-MCNC: 0.95 MG/DL
CREAT SPEC-SCNC: 35 MG/DL
EOSINOPHIL # BLD AUTO: 0.2 K/UL
EOSINOPHIL NFR BLD AUTO: 2.8 %
FERRITIN SERPL-MCNC: 20 NG/ML
GLUCOSE SERPL-MCNC: 211 MG/DL
HCT VFR BLD CALC: 32.6 %
HGB BLD-MCNC: 10.4 G/DL
IMM GRANULOCYTES NFR BLD AUTO: 0.3 %
LYMPHOCYTES # BLD AUTO: 1.84 K/UL
LYMPHOCYTES NFR BLD AUTO: 25.5 %
MAN DIFF?: NORMAL
MCHC RBC-ENTMCNC: 29.5 PG
MCHC RBC-ENTMCNC: 31.9 GM/DL
MCV RBC AUTO: 92.6 FL
MICROALBUMIN 24H UR DL<=1MG/L-MCNC: <1.2 MG/DL
MICROALBUMIN/CREAT 24H UR-RTO: NORMAL MG/G
MONOCYTES # BLD AUTO: 0.57 K/UL
MONOCYTES NFR BLD AUTO: 7.9 %
NEUTROPHILS # BLD AUTO: 4.57 K/UL
NEUTROPHILS NFR BLD AUTO: 63.4 %
PLATELET # BLD AUTO: 198 K/UL
POTASSIUM SERPL-SCNC: 4.3 MMOL/L
PROT SERPL-MCNC: 6.7 G/DL
RBC # BLD: 3.52 M/UL
RBC # FLD: 12.6 %
SODIUM SERPL-SCNC: 140 MMOL/L
WBC # FLD AUTO: 7.21 K/UL

## 2020-07-06 LAB
DEPRECATED S PNEUM 1 IGG SER-MCNC: 4.4 MCG/ML
DEPRECATED S PNEUM12 AB SER-ACNC: 0.4 MCG/ML
DEPRECATED S PNEUM14 AB SER-ACNC: 0.7 MCG/ML
DEPRECATED S PNEUM17 IGG SER IA-MCNC: 3.1 MCG/ML
DEPRECATED S PNEUM18 IGG SER IA-MCNC: 27.2 MCG/ML
DEPRECATED S PNEUM19 IGG SER-MCNC: 7.1 MCG/ML
DEPRECATED S PNEUM19 IGG SER-MCNC: NORMAL MCG/ML
DEPRECATED S PNEUM2 IGG SER-MCNC: 4.1 MCG/ML
DEPRECATED S PNEUM20 IGG SER-MCNC: <0.4 MCG/ML
DEPRECATED S PNEUM22 IGG SER-MCNC: 35.3 MCG/ML
DEPRECATED S PNEUM23 AB SER-ACNC: 18 MCG/ML
DEPRECATED S PNEUM3 AB SER-ACNC: 1.7 MCG/ML
DEPRECATED S PNEUM34 IGG SER-MCNC: 2.2 MCG/ML
DEPRECATED S PNEUM4 AB SER-ACNC: 0.4 MCG/ML
DEPRECATED S PNEUM5 IGG SER-MCNC: 17.4 MCG/ML
DEPRECATED S PNEUM6 IGG SER-MCNC: <0.4 MCG/ML
DEPRECATED S PNEUM7 IGG SER-ACNC: 2.2 MCG/ML
DEPRECATED S PNEUM8 AB SER-ACNC: 24.2 MCG/ML
DEPRECATED S PNEUM9 AB SER-ACNC: 0.9 MCG/ML
DEPRECATED S PNEUM9 IGG SER-MCNC: 2 MCG/ML
STREPTOCOCCUS PNEUMONIAE SEROTYPE 11A: 2.9 MCG/ML
STREPTOCOCCUS PNEUMONIAE SEROTYPE 15B: 4.5 MCG/ML
STREPTOCOCCUS PNEUMONIAE SEROTYPE 33F: 28.3 MCG/ML

## 2020-07-13 ENCOUNTER — APPOINTMENT (OUTPATIENT)
Dept: PULMONOLOGY | Facility: CLINIC | Age: 40
End: 2020-07-13
Payer: COMMERCIAL

## 2020-07-13 VITALS
OXYGEN SATURATION: 98 % | BODY MASS INDEX: 37.05 KG/M2 | TEMPERATURE: 97.7 F | WEIGHT: 217 LBS | HEIGHT: 64 IN | SYSTOLIC BLOOD PRESSURE: 120 MMHG | HEART RATE: 103 BPM | DIASTOLIC BLOOD PRESSURE: 70 MMHG | RESPIRATION RATE: 16 BRPM

## 2020-07-13 DIAGNOSIS — J34.3 HYPERTROPHY OF NASAL TURBINATES: ICD-10-CM

## 2020-07-13 DIAGNOSIS — R09.89 OTHER SPECIFIED SYMPTOMS AND SIGNS INVOLVING THE CIRCULATORY AND RESPIRATORY SYSTEMS: ICD-10-CM

## 2020-07-13 DIAGNOSIS — R89.9 UNSPECIFIED ABNORMAL FINDING IN SPECIMENS FROM OTHER ORGANS, SYSTEMS AND TISSUES: ICD-10-CM

## 2020-07-13 DIAGNOSIS — Z11.59 ENCOUNTER FOR SCREENING FOR OTHER VIRAL DISEASES: ICD-10-CM

## 2020-07-13 DIAGNOSIS — G47.36 SLEEP RELATED HYPOVENTILATION IN CONDITIONS CLASSIFIED ELSEWHERE: ICD-10-CM

## 2020-07-13 LAB
SARS-COV-2 IGG SERPL IA-ACNC: 7.21 AU/ML
SARS-COV-2 IGG SERPL QL IA: NEGATIVE

## 2020-07-13 PROCEDURE — 99214 OFFICE O/P EST MOD 30 MIN: CPT

## 2020-07-13 NOTE — HISTORY OF PRESENT ILLNESS
[FreeTextEntry1] : Ms. Champagne is a 40 year old female presenting to the office today for a sick visit. She has PMHx of allergies, asthma, GERD, and poor sleep/mild sleep apnea. She was recently diagnosed with anemia. \par \par She reports she is currently in her normal state of health. \par She was told she was anemic and followed up with PCP and GI. There is a question of slow upper GI bleed regarding this anemia. She has been on iron tabs without effect. \par She continues to feel SOB with exertion as well as fatigued.\par She is awaiting being scheduled for upper GI. If no cause of anemia is found- possible plan for colonoscopy as well.\par She may also have to follow up with Hematology. \par \par Her asthma has been stable- feels improvement with addition of spiriva. Humid/hot weather tends to worsen her asthma. Mask wearing makes her breathier. \par Uses rescue inhaler 1x a day usually. \par \par She received [id number] due to low strep titers- repeat blood work next month by PCP. \par \par She did not have COVIDAB testing as of yet but was sick in Feb. with LLL infiltrate and high fevers. \par \par

## 2020-07-13 NOTE — PHYSICAL EXAM
[General Appearance - Well Developed] : well developed [Normal Appearance] : normal appearance [Well Groomed] : well groomed [General Appearance - Well Nourished] : well nourished [No Deformities] : no deformities [General Appearance - In No Acute Distress] : no acute distress [Normal Conjunctiva] : the conjunctiva exhibited no abnormalities [Eyelids - No Xanthelasma] : the eyelids demonstrated no xanthelasmas [Normal Oropharynx] : normal oropharynx [II] : II [Neck Appearance] : the appearance of the neck was normal [FreeTextEntry1] : R nare with swollen turbinates, left not as bad [Neck Cervical Mass (___cm)] : no neck mass was observed [Heart Sounds] : normal S1 and S2 [Jugular Venous Distention Increased] : there was no jugular-venous distention [Heart Rate And Rhythm] : heart rate and rhythm were normal [Murmurs] : no murmurs present [Respiration, Rhythm And Depth] : normal respiratory rhythm and effort [Exaggerated Use Of Accessory Muscles For Inspiration] : no accessory muscle use [Auscultation Breath Sounds / Voice Sounds] : lungs were clear to auscultation bilaterally [Abdomen Soft] : soft [Abnormal Walk] : normal gait [Nail Clubbing] : no clubbing of the fingernails [Gait - Sufficient For Exercise Testing] : the gait was sufficient for exercise testing [Petechial Hemorrhages (___cm)] : no petechial hemorrhages [Cyanosis, Localized] : no localized cyanosis [] : no rash [Skin Color & Pigmentation] : normal skin color and pigmentation [No Focal Deficits] : no focal deficits [Oriented To Time, Place, And Person] : oriented to person, place, and time [Mood] : the mood was normal [Affect] : the affect was normal [Impaired Insight] : insight and judgment were intact

## 2020-07-13 NOTE — ASSESSMENT
[FreeTextEntry1] : The plan for the patient is as follows:\par \par problem 1: asthma-stable\par -Singulair 10 mg before bed\par -Continue symbicort 160 2 BID rinse and gargle after use\par -add Spiriva respimat 2.5 2 puffs in am \par -ProAir PRN SOB and prior to exercise\par \par problem 2: hx of allergies, intermittent post nasal drip\par -continue to use nasal saline\par -continue xhance daily for at least 2 weeks then can use PRN\par -PRN claritin\par \par problem 3: GERD hx with intermittent episodes now resolved\par -continue omeprazole 40 mg PO 30 min before breakfast \par -continue famotadine 40 mg PO QHS\par -Rule of 2s: avoid eating too much, eating too late, eating too spicy, eating two hours before bed- discussed in detail\par -pt to see GI next\par \par problem 4:Mild sleep apnea\par -needs weight loss\par -discussed Provent\par -can try Theravent (OTC)\par -does not want to move forward with PAP therapy\par -did not due well with oral appliance before\par \par Problem 5: Fatigue/SOB\par -anemia contributing\par -on iron\par -waiting for GI work up \par \par Problem 6: Low strep titers\par -pt received Pneumovax\par -she has follow up blood work in a month- can re-assess\par \par problem 7: Screening for viral disease - hx of LLL and fevers\par -covid IgG\par \par F/U in 4-6 months\par Needs PFTs in the next month- can be scheduled once she is COVID swabbed\par She is encouraged to call with any changes, concerns or questions. \par She was encouraged to call if symptoms were not resolving for her by next week.

## 2020-07-13 NOTE — REVIEW OF SYSTEMS
[Fever] : no fever [Fatigue] : fatigue [Poor Appetite] : normal appetite  [Nasal Congestion] : no nasal congestion [Ear Disturbance] : no ear disturbance [Sore Throat] : no sore throat [Postnasal Drip] : no postnasal drip [Dry Mouth] : no dry mouth [Cough] : no cough [Sputum] : not coughing up ~M sputum [Dyspnea] : dyspnea [Chest Tightness] : no chest tightness [Wheezing] : no wheezing [Heartburn] : no heartburn [Reflux] : no reflux [Indigestion] : no indigestion [As Noted in HPI] : as noted in HPI [Difficulty Initiating Sleep] : difficulty falling asleep [Difficulty Maintaining Sleep] : difficulty maintaining sleep [Nonrestorative Sleep] : nonrestorative sleep [Negative] : Endocrine [FreeTextEntry7] : Reflux controlled with meds

## 2020-07-13 NOTE — REASON FOR VISIT
[Acute] : an acute visit [Follow-Up] : a follow-up visit [Asthma] : asthma [Cough] : cough [Shortness of Breath] : shortness of Breath [Wheezing] : wheezing [FreeTextEntry2] : GERD

## 2020-07-24 ENCOUNTER — APPOINTMENT (OUTPATIENT)
Dept: DISASTER EMERGENCY | Facility: CLINIC | Age: 40
End: 2020-07-24

## 2020-07-24 ENCOUNTER — APPOINTMENT (OUTPATIENT)
Dept: INTERNAL MEDICINE | Facility: CLINIC | Age: 40
End: 2020-07-24
Payer: COMMERCIAL

## 2020-07-24 VITALS
BODY MASS INDEX: 37.22 KG/M2 | DIASTOLIC BLOOD PRESSURE: 80 MMHG | HEIGHT: 64 IN | WEIGHT: 218 LBS | TEMPERATURE: 98.2 F | SYSTOLIC BLOOD PRESSURE: 120 MMHG

## 2020-07-24 DIAGNOSIS — N39.0 URINARY TRACT INFECTION, SITE NOT SPECIFIED: ICD-10-CM

## 2020-07-24 DIAGNOSIS — Z01.818 ENCOUNTER FOR OTHER PREPROCEDURAL EXAMINATION: ICD-10-CM

## 2020-07-24 LAB
BILIRUB UR QL STRIP: NEGATIVE
CLARITY UR: ABNORMAL
COLLECTION METHOD: NORMAL
GLUCOSE UR-MCNC: 100
HCG UR QL: 0.2 EU/DL
HGB UR QL STRIP.AUTO: ABNORMAL
KETONES UR-MCNC: NEGATIVE
LEUKOCYTE ESTERASE UR QL STRIP: ABNORMAL
NITRITE UR QL STRIP: POSITIVE
PH UR STRIP: 5.5
PROT UR STRIP-MCNC: ABNORMAL
SP GR UR STRIP: 1.02

## 2020-07-24 PROCEDURE — 99214 OFFICE O/P EST MOD 30 MIN: CPT | Mod: 25

## 2020-07-24 PROCEDURE — 36415 COLL VENOUS BLD VENIPUNCTURE: CPT

## 2020-07-24 PROCEDURE — 81003 URINALYSIS AUTO W/O SCOPE: CPT | Mod: QW

## 2020-07-25 LAB — SARS-COV-2 N GENE NPH QL NAA+PROBE: NOT DETECTED

## 2020-07-28 ENCOUNTER — APPOINTMENT (OUTPATIENT)
Dept: PULMONOLOGY | Facility: CLINIC | Age: 40
End: 2020-07-28
Payer: COMMERCIAL

## 2020-07-28 PROCEDURE — 94727 GAS DIL/WSHOT DETER LNG VOL: CPT

## 2020-07-28 PROCEDURE — 94010 BREATHING CAPACITY TEST: CPT

## 2020-07-28 PROCEDURE — 94729 DIFFUSING CAPACITY: CPT

## 2020-07-29 ENCOUNTER — APPOINTMENT (OUTPATIENT)
Dept: GASTROENTEROLOGY | Facility: CLINIC | Age: 40
End: 2020-07-29
Payer: COMMERCIAL

## 2020-07-29 ENCOUNTER — LABORATORY RESULT (OUTPATIENT)
Age: 40
End: 2020-07-29

## 2020-07-29 LAB
ALBUMIN SERPL ELPH-MCNC: 4.4 G/DL
ALP BLD-CCNC: 61 U/L
ALT SERPL-CCNC: 7 U/L
ANION GAP SERPL CALC-SCNC: 14 MMOL/L
AST SERPL-CCNC: 16 U/L
BASOPHILS # BLD AUTO: 0.02 K/UL
BASOPHILS NFR BLD AUTO: 0.2 %
BILIRUB SERPL-MCNC: 0.2 MG/DL
BUN SERPL-MCNC: 25 MG/DL
CALCIUM SERPL-MCNC: 9.3 MG/DL
CHLORIDE SERPL-SCNC: 99 MMOL/L
CO2 SERPL-SCNC: 26 MMOL/L
CREAT SERPL-MCNC: 1.08 MG/DL
EOSINOPHIL # BLD AUTO: 0.2 K/UL
EOSINOPHIL NFR BLD AUTO: 2.1 %
FERRITIN SERPL-MCNC: 62 NG/ML
GLUCOSE SERPL-MCNC: 246 MG/DL
HCT VFR BLD CALC: 34.1 %
HGB BLD-MCNC: 10.8 G/DL
IMM GRANULOCYTES NFR BLD AUTO: 0.1 %
IRON SATN MFR SERPL: 28 %
IRON SERPL-MCNC: 76 UG/DL
LYMPHOCYTES # BLD AUTO: 1.82 K/UL
LYMPHOCYTES NFR BLD AUTO: 18.7 %
MAN DIFF?: NORMAL
MCHC RBC-ENTMCNC: 29.3 PG
MCHC RBC-ENTMCNC: 31.7 GM/DL
MCV RBC AUTO: 92.7 FL
MONOCYTES # BLD AUTO: 0.63 K/UL
MONOCYTES NFR BLD AUTO: 6.5 %
NEUTROPHILS # BLD AUTO: 7.04 K/UL
NEUTROPHILS NFR BLD AUTO: 72.4 %
PLATELET # BLD AUTO: 235 K/UL
POTASSIUM SERPL-SCNC: 4.7 MMOL/L
PROT SERPL-MCNC: 7.1 G/DL
RBC # BLD: 3.68 M/UL
RBC # FLD: 13 %
SODIUM SERPL-SCNC: 138 MMOL/L
TIBC SERPL-MCNC: 275 UG/DL
UIBC SERPL-MCNC: 198 UG/DL
WBC # FLD AUTO: 9.72 K/UL

## 2020-07-29 PROCEDURE — 84703 CHORIONIC GONADOTROPIN ASSAY: CPT | Mod: 59,QW

## 2020-07-29 PROCEDURE — 43239 EGD BIOPSY SINGLE/MULTIPLE: CPT

## 2020-09-16 ENCOUNTER — RESULT REVIEW (OUTPATIENT)
Age: 40
End: 2020-09-16

## 2020-09-16 ENCOUNTER — OUTPATIENT (OUTPATIENT)
Dept: OUTPATIENT SERVICES | Facility: HOSPITAL | Age: 40
LOS: 1 days | Discharge: HOME | End: 2020-09-16

## 2020-09-16 ENCOUNTER — APPOINTMENT (OUTPATIENT)
Dept: HEMATOLOGY ONCOLOGY | Facility: CLINIC | Age: 40
End: 2020-09-16
Payer: COMMERCIAL

## 2020-09-16 VITALS
TEMPERATURE: 98.6 F | OXYGEN SATURATION: 100 % | WEIGHT: 217.16 LBS | DIASTOLIC BLOOD PRESSURE: 80 MMHG | BODY MASS INDEX: 37.07 KG/M2 | SYSTOLIC BLOOD PRESSURE: 123 MMHG | HEART RATE: 101 BPM | HEIGHT: 64.25 IN | RESPIRATION RATE: 17 BRPM

## 2020-09-16 DIAGNOSIS — Z98.89 OTHER SPECIFIED POSTPROCEDURAL STATES: Chronic | ICD-10-CM

## 2020-09-16 DIAGNOSIS — D64.9 ANEMIA, UNSPECIFIED: ICD-10-CM

## 2020-09-16 LAB
BASOPHILS # BLD AUTO: 0.02 K/UL — SIGNIFICANT CHANGE UP (ref 0–0.2)
BASOPHILS NFR BLD AUTO: 0.3 % — SIGNIFICANT CHANGE UP (ref 0–2)
EOSINOPHIL # BLD AUTO: 0.16 K/UL — SIGNIFICANT CHANGE UP (ref 0–0.5)
EOSINOPHIL NFR BLD AUTO: 2.3 % — SIGNIFICANT CHANGE UP (ref 0–6)
ERYTHROCYTE [SEDIMENTATION RATE] IN BLOOD: 25 MM/HR — HIGH (ref 0–15)
HCT VFR BLD CALC: 32.4 % — LOW (ref 34.5–45)
HGB BLD-MCNC: 11 G/DL — LOW (ref 11.5–15.5)
IMM GRANULOCYTES NFR BLD AUTO: 0.3 % — SIGNIFICANT CHANGE UP (ref 0–1.5)
LYMPHOCYTES # BLD AUTO: 1.58 K/UL — SIGNIFICANT CHANGE UP (ref 1–3.3)
LYMPHOCYTES # BLD AUTO: 23.2 % — SIGNIFICANT CHANGE UP (ref 13–44)
MCHC RBC-ENTMCNC: 29.5 PG — SIGNIFICANT CHANGE UP (ref 27–34)
MCHC RBC-ENTMCNC: 34 G/DL — SIGNIFICANT CHANGE UP (ref 32–36)
MCV RBC AUTO: 86.9 FL — SIGNIFICANT CHANGE UP (ref 80–100)
MONOCYTES # BLD AUTO: 0.67 K/UL — SIGNIFICANT CHANGE UP (ref 0–0.9)
MONOCYTES NFR BLD AUTO: 9.8 % — SIGNIFICANT CHANGE UP (ref 2–14)
NEUTROPHILS # BLD AUTO: 4.36 K/UL — SIGNIFICANT CHANGE UP (ref 1.8–7.4)
NEUTROPHILS NFR BLD AUTO: 64.1 % — SIGNIFICANT CHANGE UP (ref 43–77)
NRBC # BLD: 0 /100 WBCS — SIGNIFICANT CHANGE UP (ref 0–0)
PLATELET # BLD AUTO: 179 K/UL — SIGNIFICANT CHANGE UP (ref 150–400)
RBC # BLD: 3.73 M/UL — LOW (ref 3.8–5.2)
RBC # FLD: 12.7 % — SIGNIFICANT CHANGE UP (ref 10.3–14.5)
WBC # BLD: 6.81 K/UL — SIGNIFICANT CHANGE UP (ref 3.8–10.5)
WBC # FLD AUTO: 6.81 K/UL — SIGNIFICANT CHANGE UP (ref 3.8–10.5)

## 2020-09-16 PROCEDURE — 99205 OFFICE O/P NEW HI 60 MIN: CPT

## 2020-09-18 NOTE — ASSESSMENT
[FreeTextEntry1] : 39yo F w/ type 1 diabetes, HTN, asthma, mild KAL, GERD, h/o RA, migraines here for further evaluation of anemia.\par Will repeat iron studies now that pt is off iron supplements. B12 borderline, will repeat and check MMA and homocysteine.\par Pt may have anemia of chronic disease given her h/o RA and not being on treatment. Will check ESR, CRP. Check AKIN and RF. Consider f/u w/ rheum\par CMP wnl, check quant immunos\par Will call pt with results\par All questions answered

## 2020-09-18 NOTE — HISTORY OF PRESENT ILLNESS
[de-identified] : 41yo F w/ type 1 diabetes, HTN, asthma, mild KAL, GERD, migraines here for further evaluation of anemia.\par \par She reports for the last year and a half, working out but cannot lose weight and had episodes of palpitations, SOB and dizziness. \par She was noted to have mid anemia of 10.9 on 5/2020. Prior to that, no labs since 2016. Ferritin was 21, she was started on iron supplements. TSH normal. B12 483. Most recent in 7/2020 Hgb was 10.8, ferritin 61. She was then told to d/c iron. HbEP normal.\par Had endoscopy in 7/2020 - no bleeding.\par She was anemic during pregnancy 4 years ago, took iron supplements 3x/wk and it resolved after delivery. \par Her menses are monthly, last for 5 days. Not heavy - uses 3-4 super tampons first 2 days. \par She has h/o RA 11/2018, previously on Plaquenil in addition to another med that she cannot remember. Per pt, endocrine was concerned of renal toxicity so the meds were stopped. Still has joint pains and stiffness. Currently using CBD oil for joint pain.

## 2020-09-20 ENCOUNTER — RX RENEWAL (OUTPATIENT)
Age: 40
End: 2020-09-20

## 2020-09-22 LAB
ALBUMIN MFR SERPL ELPH: 52.3 %
ALBUMIN SERPL ELPH-MCNC: 4.3 G/DL
ALBUMIN SERPL-MCNC: 3.7 G/DL
ALBUMIN/GLOB SERPL: 1.1 RATIO
ALP BLD-CCNC: 54 U/L
ALPHA1 GLOB MFR SERPL ELPH: 3.9 %
ALPHA1 GLOB SERPL ELPH-MCNC: 0.3 G/DL
ALPHA2 GLOB MFR SERPL ELPH: 10 %
ALPHA2 GLOB SERPL ELPH-MCNC: 0.7 G/DL
ALT SERPL-CCNC: 5 U/L
ANA SER IF-ACNC: NEGATIVE
ANION GAP SERPL CALC-SCNC: 12 MMOL/L
AST SERPL-CCNC: 14 U/L
B-GLOBULIN MFR SERPL ELPH: 13.4 %
B-GLOBULIN SERPL ELPH-MCNC: 1 G/DL
BILIRUB SERPL-MCNC: 0.4 MG/DL
BUN SERPL-MCNC: 16 MG/DL
CALCIUM SERPL-MCNC: 9 MG/DL
CHLORIDE SERPL-SCNC: 104 MMOL/L
CO2 SERPL-SCNC: 25 MMOL/L
CREAT SERPL-MCNC: 1.03 MG/DL
CRP SERPL-MCNC: 0.15 MG/DL
DEPRECATED KAPPA LC FREE/LAMBDA SER: 1.65 RATIO
FERRITIN SERPL-MCNC: 26 NG/ML
FOLATE SERPL-MCNC: 10.7 NG/ML
GAMMA GLOB FLD ELPH-MCNC: 1.4 G/DL
GAMMA GLOB MFR SERPL ELPH: 20.4 %
GLUCOSE SERPL-MCNC: 171 MG/DL
HCYS SERPL-MCNC: 7.6 UMOL/L
IGA SER QL IEP: 299 MG/DL
IGG SER QL IEP: 1218 MG/DL
IGM SER QL IEP: 158 MG/DL
INTERPRETATION SERPL IEP-IMP: NORMAL
IRON SATN MFR SERPL: 20 %
IRON SERPL-MCNC: 63 UG/DL
KAPPA LC CSF-MCNC: 1.64 MG/DL
KAPPA LC SERPL-MCNC: 2.7 MG/DL
LDH SERPL-CCNC: 179 U/L
METHYLMALONATE SERPL-SCNC: 138 NMOL/L
POTASSIUM SERPL-SCNC: 4.3 MMOL/L
PROT SERPL-MCNC: 7.1 G/DL
RHEUMATOID FACT SER QL: <10 IU/ML
SODIUM SERPL-SCNC: 142 MMOL/L
TIBC SERPL-MCNC: 318 UG/DL
UIBC SERPL-MCNC: 255 UG/DL
VIT B12 SERPL-MCNC: 447 PG/ML

## 2020-09-30 ENCOUNTER — APPOINTMENT (OUTPATIENT)
Dept: CARDIOLOGY | Facility: CLINIC | Age: 40
End: 2020-09-30
Payer: COMMERCIAL

## 2020-09-30 ENCOUNTER — APPOINTMENT (OUTPATIENT)
Dept: ENDOCRINOLOGY | Facility: CLINIC | Age: 40
End: 2020-09-30
Payer: COMMERCIAL

## 2020-09-30 ENCOUNTER — NON-APPOINTMENT (OUTPATIENT)
Age: 40
End: 2020-09-30

## 2020-09-30 VITALS
BODY MASS INDEX: 36.7 KG/M2 | HEIGHT: 64.25 IN | RESPIRATION RATE: 15 BRPM | HEART RATE: 60 BPM | DIASTOLIC BLOOD PRESSURE: 85 MMHG | WEIGHT: 215 LBS | SYSTOLIC BLOOD PRESSURE: 125 MMHG

## 2020-09-30 VITALS
TEMPERATURE: 98.2 F | WEIGHT: 215 LBS | HEIGHT: 64.25 IN | DIASTOLIC BLOOD PRESSURE: 86 MMHG | OXYGEN SATURATION: 98 % | BODY MASS INDEX: 36.7 KG/M2 | HEART RATE: 100 BPM | SYSTOLIC BLOOD PRESSURE: 124 MMHG

## 2020-09-30 PROCEDURE — 83036 HEMOGLOBIN GLYCOSYLATED A1C: CPT | Mod: QW

## 2020-09-30 PROCEDURE — 95251 CONT GLUC MNTR ANALYSIS I&R: CPT

## 2020-09-30 PROCEDURE — 99244 OFF/OP CNSLTJ NEW/EST MOD 40: CPT | Mod: 25

## 2020-09-30 PROCEDURE — 99213 OFFICE O/P EST LOW 20 MIN: CPT

## 2020-09-30 PROCEDURE — 82962 GLUCOSE BLOOD TEST: CPT

## 2020-09-30 PROCEDURE — 93000 ELECTROCARDIOGRAM COMPLETE: CPT

## 2020-09-30 RX ORDER — NITROFURANTOIN (MONOHYDRATE/MACROCRYSTALS) 25; 75 MG/1; MG/1
100 CAPSULE ORAL
Qty: 10 | Refills: 0 | Status: COMPLETED | COMMUNITY
Start: 2020-07-24 | End: 2020-09-30

## 2020-10-06 LAB
CREAT SPEC-SCNC: 120 MG/DL
GLUCOSE BLDC GLUCOMTR-MCNC: 87
HBA1C MFR BLD HPLC: 6.3
MICROALBUMIN 24H UR DL<=1MG/L-MCNC: <1.2 MG/DL
MICROALBUMIN/CREAT 24H UR-RTO: NORMAL MG/G
T4 FREE SERPL-MCNC: 1.2 NG/DL
TSH SERPL-ACNC: 1.51 UIU/ML
TTG IGA SER IA-ACNC: <1.2 U/ML
TTG IGA SER-ACNC: NEGATIVE
TTG IGG SER IA-ACNC: 2.3 U/ML
TTG IGG SER IA-ACNC: NEGATIVE

## 2020-10-27 ENCOUNTER — APPOINTMENT (OUTPATIENT)
Dept: PULMONOLOGY | Facility: CLINIC | Age: 40
End: 2020-10-27

## 2020-12-02 ENCOUNTER — APPOINTMENT (OUTPATIENT)
Dept: INTERNAL MEDICINE | Facility: CLINIC | Age: 40
End: 2020-12-02
Payer: COMMERCIAL

## 2020-12-02 ENCOUNTER — APPOINTMENT (OUTPATIENT)
Dept: PULMONOLOGY | Facility: CLINIC | Age: 40
End: 2020-12-02
Payer: COMMERCIAL

## 2020-12-02 ENCOUNTER — NON-APPOINTMENT (OUTPATIENT)
Age: 40
End: 2020-12-02

## 2020-12-02 VITALS
DIASTOLIC BLOOD PRESSURE: 80 MMHG | WEIGHT: 224 LBS | BODY MASS INDEX: 38.24 KG/M2 | TEMPERATURE: 97.5 F | HEIGHT: 64 IN | HEART RATE: 100 BPM | RESPIRATION RATE: 16 BRPM | OXYGEN SATURATION: 98 % | SYSTOLIC BLOOD PRESSURE: 120 MMHG

## 2020-12-02 DIAGNOSIS — D64.9 ANEMIA, UNSPECIFIED: ICD-10-CM

## 2020-12-02 DIAGNOSIS — J45.909 UNSPECIFIED ASTHMA, UNCOMPLICATED: ICD-10-CM

## 2020-12-02 DIAGNOSIS — J18.9 PNEUMONIA, UNSPECIFIED ORGANISM: ICD-10-CM

## 2020-12-02 PROCEDURE — 90686 IIV4 VACC NO PRSV 0.5 ML IM: CPT

## 2020-12-02 PROCEDURE — 99072 ADDL SUPL MATRL&STAF TM PHE: CPT

## 2020-12-02 PROCEDURE — G0008: CPT

## 2020-12-02 PROCEDURE — 99214 OFFICE O/P EST MOD 30 MIN: CPT

## 2020-12-02 NOTE — ASSESSMENT
[FreeTextEntry1] : Ms. Champagne is a 40 year old female with a history of DM, HLD, heart murmur, HTN, asthma, allergies, GERD, recent anemia, rheumatoid arthritis, presenting to the office for a follow up visit.\par \par The patient's shortness of breath is multifactorial due to:\par -pulmonary disease \par      -asthma\par -poor breathing mechanics \par -overweight/out of shape\par -?cardiac disease \par \par problem 1: asthma \par -continue Symbicort (160) 2 inhalations BID\par -continue Incruse 1 inhalation daily\par -continue to use Singulair 10 mg before bed\par -continue Ventolin rescue inhaler 2 inhalations before exercise, Q6H \par -recommended to cover her nose and mouth in the cold air\par \par -Asthma is  believed to be caused by inherited (genetic) and environmental factor, but its exact cause is unknown. Asthma may be triggered by allergens, lung infections, or irritants in the air. Asthma triggers are different for each person\par -Inhaler technique reviewed as well as oral hygiene techniques reviewed with patient. Avoidance of cold air, extremes of temperature, rescue inhaler should be used before exercise. Order of medication reviewed with patient. Recommended use of a cool mist humidifier in the bedroom. \par \par problem 2: allergies and sinuses\par -Add Olopatadine 0.6% at 1 sniff/nostril BID \par -continue to use OTC Claritin QAM\par -continue to use nasal saline\par -continue to use Xyzal 5 mg before bed \par \par -Environmental measures for allergies were encouraged including mattress and pillow cover, air purifier, and environmental controls. \par \par Problem 2A: R/o immune deficiency\par -Complete blood work: strep pneumonia titers, IgG levels, quantitative immunoglobulins \par -Patient received Pneumovax in 7/2020\par -Due to the fact that this pt has had more infections than would be expected and immunological blood work is indicated this would include: IgG subclasses, quantitative immunoglobulins, Strep pneumoniae titers as well as Vitamin D levels. Based on this blood work we will be able to decide where the pt needs additional pneumococcal vaccine either Prevnar 13 or pneumovax. Immunology evaluation will also be potentially indicated.\par \par problem 3: GERD \par -Omeprazole 40 mg before breakfast\par \par -Rule of 2s: avoid eating too much, eating too late, eating too spicy, eating two hours before bed\par -Things to avoid including overeating, spicy foods, tight clothing, eating within three hours of bed, this list is not all inclusive. \par -For treatment of reflux, possible options discussed including diet control, H2 blockers, PPIs, as well as coating motility agents discussed as treatment options. Timing of meals and proximity of last meal to sleep were discussed. If symptoms persist, a formal gastrointestinal evaluation is needed. \par \par Problem 4: anemia\par -continue to follow up with hematology, endocrinology\par \par problem 5: overweight (Tomas / Tate)\par -Weight loss, exercise, and diet control were discussed and are highly encouraged. Treatment options were given such as, aqua therapy, and contacting a nutritionist. Recommended to use the elliptical, stationary bike, less use of treadmill.  Obesity is associated with worsening asthma, shortness of breath, and potential for cardiac disease, diabetes, and other underlying medical conditions. \par \par problem 6: mild sleep apnea\par -continue to use Oxy-Aid and Breathe Rite strips, Provent and Theravent\par -recommended to use an oral appliance and she was given the names of Dr. Crescencio Cortez \par -based on her fatigue, EDS, snoring, and elevated mallampati class\par -Discussed the risks/associations with coronary artery disease, atrial fibrillation, arrhythmia, memory loss, issues with concentration, hypertension, nocturia, chronic reflux/Patrick’s esophagus some but not all inclusive. Treatment options discussed including CPAP/BiPAP machine, oral appliance, ProVent therapy, Oxy-Aid by [employer]pitec, new technologies, or positional sleep.\par \par problem 7: hand pain \par -she was given the names of Dr. Gerson Breen and Dr. Darius Brody \par -recommended to have an evaluation with Shaquille Aguilar for hand splints or to have an evaluation for a orthotist \par -she can also try OTC wrist splints \par \par Problem 8: Cardiac\par -Continue to follow up with Dr. Weinberg.\par \par Problem 9: health maintenance\par -s/p influenza vaccine\par -recommended strep pneumonia vaccines after age 65: Prevnar-13 vaccine, followed by Pneumo vaccine 23 one year following\par -recommended early intervention for URIs\par -recommended regular osteoporosis evaluations\par -recommended early dermatological evaluations\par -recommended after the age of 50 to the age of 70, colonoscopy every 5 years \par \par F/U in 4 months\par She is encouraged to call with any changes, concerns or questions. \par

## 2020-12-02 NOTE — HISTORY OF PRESENT ILLNESS
[FreeTextEntry1] : Ms. Champagne is a 36 year old female presenting to the office today for a follow up visit for allergies, asthma, GERD, and poor sleep. Her chief complaint is \par -she notes she hd been having SOB, and determined she had anemia. She went to a hematologist in September, and was told it was chronic. She found inflammation that caused the anemia. She was told her blood work was normal, but wanted her to follow up every 3 months. She was suggested to see an immunologist as she frequently got sick before the pandemic. She hasn’t been to a rheumatologist yet.\par -she notes her rheumatoid arthritis is okay, only having issues ith carpal tunnel and mild back pain. She has stoped RA medications due to the possibility of side effects\par -she notes she has been wheezing\par -she notes she has been sleeping better with using a device\par -she denies any headaches, nausea, vomiting, fever, chills, sweats, chest pain, chest pressure, diarrhea, constipation, dysphagia, dizziness, sour taste in the mouth, leg swelling, leg pain, itchy eyes, itchy ears, heartburn, reflux, myalgias or arthralgias.

## 2020-12-02 NOTE — ADDENDUM
[FreeTextEntry1] : Documented by Aniceto Begum acting as a scribe for Dr. Herber Day on 12/02/2020.\par \par All medical record entries made by the Scribe were at my, Dr. Herber Day's, direction and personally dictated by me on 12/02/2020. I have reviewed the chart and agree that the record accurately reflects my personal performance of the history, physical exam, assessment and plan. I have also personally directed, reviewed, and agree with the discharge instructions.

## 2020-12-02 NOTE — REVIEW OF SYSTEMS
[Dyspnea] : dyspnea [Wheezing] : wheezing [Negative] : Endocrine [Chest Discomfort] : no chest discomfort [GERD] : no gerd [Diarrhea] : no diarrhea [Constipation] : no constipation [Dysphagia] : no dysphagia

## 2020-12-03 LAB
DEPRECATED KAPPA LC FREE/LAMBDA SER: 1.19 RATIO
IGA SER QL IEP: 342 MG/DL
IGG SER QL IEP: 1558 MG/DL
IGM SER QL IEP: 179 MG/DL
KAPPA LC CSF-MCNC: 2.31 MG/DL
KAPPA LC SERPL-MCNC: 2.74 MG/DL

## 2020-12-07 LAB
DEPRECATED S PNEUM 1 IGG SER-MCNC: 4.4 MCG/ML
DEPRECATED S PNEUM12 AB SER-ACNC: 2.2 MCG/ML
DEPRECATED S PNEUM14 AB SER-ACNC: 1.5 MCG/ML
DEPRECATED S PNEUM17 IGG SER IA-MCNC: 6.9 MCG/ML
DEPRECATED S PNEUM18 IGG SER IA-MCNC: 19 MCG/ML
DEPRECATED S PNEUM19 IGG SER-MCNC: 6.9 MCG/ML
DEPRECATED S PNEUM19 IGG SER-MCNC: 8.1 MCG/ML
DEPRECATED S PNEUM2 IGG SER-MCNC: 3.3 MCG/ML
DEPRECATED S PNEUM20 IGG SER-MCNC: <0.4 MCG/ML
DEPRECATED S PNEUM22 IGG SER-MCNC: 18.6 MCG/ML
DEPRECATED S PNEUM23 AB SER-ACNC: 19.8 MCG/ML
DEPRECATED S PNEUM3 AB SER-ACNC: 1.3 MCG/ML
DEPRECATED S PNEUM34 IGG SER-MCNC: 2.3 MCG/ML
DEPRECATED S PNEUM4 AB SER-ACNC: <0.4 MCG/ML
DEPRECATED S PNEUM5 IGG SER-MCNC: 13.1 MCG/ML
DEPRECATED S PNEUM6 IGG SER-MCNC: <0.4 MCG/ML
DEPRECATED S PNEUM7 IGG SER-ACNC: 1.1 MCG/ML
DEPRECATED S PNEUM8 AB SER-ACNC: 35.3 MCG/ML
DEPRECATED S PNEUM9 AB SER-ACNC: NORMAL
DEPRECATED S PNEUM9 IGG SER-MCNC: 1.3 MCG/ML
STREPTOCOCCUS PNEUMONIAE SEROTYPE 11A: 2.3 MCG/ML
STREPTOCOCCUS PNEUMONIAE SEROTYPE 15B: 3.4 MCG/ML
STREPTOCOCCUS PNEUMONIAE SEROTYPE 33F: 24.3 MCG/ML

## 2020-12-11 ENCOUNTER — NON-APPOINTMENT (OUTPATIENT)
Age: 40
End: 2020-12-11

## 2020-12-15 ENCOUNTER — TRANSCRIPTION ENCOUNTER (OUTPATIENT)
Age: 40
End: 2020-12-15

## 2020-12-16 ENCOUNTER — APPOINTMENT (OUTPATIENT)
Dept: PULMONOLOGY | Facility: CLINIC | Age: 40
End: 2020-12-16
Payer: COMMERCIAL

## 2020-12-16 PROCEDURE — G0009: CPT

## 2020-12-16 PROCEDURE — 90670 PCV13 VACCINE IM: CPT

## 2020-12-16 PROCEDURE — 99072 ADDL SUPL MATRL&STAF TM PHE: CPT

## 2020-12-17 ENCOUNTER — RX RENEWAL (OUTPATIENT)
Age: 40
End: 2020-12-17

## 2020-12-17 LAB
IGG SUBSET TOTAL IGG: 1540 MG/DL
IGG1 SER-MCNC: 715 MG/DL
IGG2 SER-MCNC: 529 MG/DL
IGG3 SER-MCNC: 46 MG/DL
IGG4 SER-MCNC: 97 MG/DL

## 2020-12-23 PROBLEM — N39.0 ACUTE URINARY TRACT INFECTION: Status: RESOLVED | Noted: 2020-07-24 | Resolved: 2020-12-23

## 2020-12-29 RX ORDER — TIOTROPIUM BROMIDE INHALATION SPRAY 3.12 UG/1
2.5 SPRAY, METERED RESPIRATORY (INHALATION) DAILY
Qty: 1 | Refills: 3 | Status: DISCONTINUED | COMMUNITY
Start: 2019-11-25 | End: 2020-12-29

## 2021-01-01 NOTE — HISTORY REVIEWED
[History reviewed] : History reviewed. [Medications and Allergies reviewed] : Medications and allergies reviewed. negative

## 2021-01-04 ENCOUNTER — NON-APPOINTMENT (OUTPATIENT)
Age: 41
End: 2021-01-04

## 2021-01-04 RX ORDER — BUDESONIDE AND FORMOTEROL FUMARATE DIHYDRATE 160; 4.5 UG/1; UG/1
160-4.5 AEROSOL RESPIRATORY (INHALATION) TWICE DAILY
Qty: 3 | Refills: 1 | Status: DISCONTINUED | COMMUNITY
Start: 2019-09-26 | End: 2021-01-04

## 2021-02-08 ENCOUNTER — APPOINTMENT (OUTPATIENT)
Dept: ENDOCRINOLOGY | Facility: CLINIC | Age: 41
End: 2021-02-08

## 2021-02-10 DIAGNOSIS — Z01.812 ENCOUNTER FOR PREPROCEDURAL LABORATORY EXAMINATION: ICD-10-CM

## 2021-02-28 LAB — SARS-COV-2 N GENE NPH QL NAA+PROBE: NOT DETECTED

## 2021-03-03 ENCOUNTER — APPOINTMENT (OUTPATIENT)
Dept: PULMONOLOGY | Facility: CLINIC | Age: 41
End: 2021-03-03
Payer: COMMERCIAL

## 2021-03-03 VITALS
BODY MASS INDEX: 39.27 KG/M2 | DIASTOLIC BLOOD PRESSURE: 78 MMHG | HEART RATE: 99 BPM | TEMPERATURE: 97.2 F | OXYGEN SATURATION: 96 % | RESPIRATION RATE: 16 BRPM | WEIGHT: 230 LBS | HEIGHT: 64 IN | SYSTOLIC BLOOD PRESSURE: 140 MMHG

## 2021-03-03 PROCEDURE — 95012 NITRIC OXIDE EXP GAS DETER: CPT

## 2021-03-03 PROCEDURE — 99214 OFFICE O/P EST MOD 30 MIN: CPT | Mod: 25

## 2021-03-03 PROCEDURE — 94729 DIFFUSING CAPACITY: CPT

## 2021-03-03 PROCEDURE — 99072 ADDL SUPL MATRL&STAF TM PHE: CPT

## 2021-03-03 PROCEDURE — 94727 GAS DIL/WSHOT DETER LNG VOL: CPT

## 2021-03-03 PROCEDURE — 94010 BREATHING CAPACITY TEST: CPT

## 2021-03-03 NOTE — ASSESSMENT
[FreeTextEntry1] : Ms. Champagne is a 40 year old female with a history of DM, HLD, heart murmur, HTN, asthma, allergies, GERD, recent anemia, rheumatoid arthritis, presenting to the office for a follow up visit. - improved\par \par The patient's shortness of breath is multifactorial due to:\par -pulmonary disease \par      -asthma\par -poor breathing mechanics \par -overweight/out of shape\par -?cardiac disease \par \par problem 1: asthma \par -continue Symbicort (160) 2 inhalations BID\par -continue Incruse 1 inhalation daily\par -continue to use Singulair 10 mg before bed\par -continue Ventolin rescue inhaler 2 inhalations before exercise, Q6H \par -recommended to cover her nose and mouth in the cold air\par \par -Asthma is  believed to be caused by inherited (genetic) and environmental factor, but its exact cause is unknown. Asthma may be triggered by allergens, lung infections, or irritants in the air. Asthma triggers are different for each person\par -Inhaler technique reviewed as well as oral hygiene techniques reviewed with patient. Avoidance of cold air, extremes of temperature, rescue inhaler should be used before exercise. Order of medication reviewed with patient. Recommended use of a cool mist humidifier in the bedroom. \par \par problem 2: allergies and sinuses\par -Add Olopatadine 0.6% at 1 sniff/nostril BID \par -continue to use OTC Claritin QAM\par -continue to use nasal saline\par -continue to use Xyzal 5 mg before bed \par \par -Environmental measures for allergies were encouraged including mattress and pillow cover, air purifier, and environmental controls. \par \par Problem 2A: R/o immune deficiency\par -Complete blood work: strep pneumonia titers (low), IgG levels (-), quantitative immunoglobulins (-)\par -Patient received Pneumovax in 7/2020, Prevnar-13 12/2020\par -Due to the fact that this pt has had more infections than would be expected and immunological blood work is indicated this would include: IgG subclasses, quantitative immunoglobulins, Strep pneumoniae titers as well as Vitamin D levels. Based on this blood work we will be able to decide where the pt needs additional pneumococcal vaccine either Prevnar 13 or pneumovax. Immunology evaluation will also be potentially indicated.\par \par problem 3: GERD \par -Omeprazole 40 mg before breakfast\par \par -Rule of 2s: avoid eating too much, eating too late, eating too spicy, eating two hours before bed\par -Things to avoid including overeating, spicy foods, tight clothing, eating within three hours of bed, this list is not all inclusive. \par -For treatment of reflux, possible options discussed including diet control, H2 blockers, PPIs, as well as coating motility agents discussed as treatment options. Timing of meals and proximity of last meal to sleep were discussed. If symptoms persist, a formal gastrointestinal evaluation is needed. \par \par Problem 4: anemia\par -continue to follow up with hematology, endocrinology\par \par problem 5: overweight (Tomas / Tate)\par -Weight loss, exercise, and diet control were discussed and are highly encouraged. Treatment options were given such as, aqua therapy, and contacting a nutritionist. Recommended to use the elliptical, stationary bike, less use of treadmill.  Obesity is associated with worsening asthma, shortness of breath, and potential for cardiac disease, diabetes, and other underlying medical conditions. \par \par problem 6: mild sleep apnea\par -continue to use Oxy-Aid and Breathe Rite strips, Provent and Theravent, Somnifix\par -recommended to use an oral appliance and she was given the names of Dr. Crescencio Cortez \par -based on her fatigue, EDS, snoring, and elevated mallampati class\par -Discussed the risks/associations with coronary artery disease, atrial fibrillation, arrhythmia, memory loss, issues with concentration, hypertension, nocturia, chronic reflux/Patrick’s esophagus some but not all inclusive. Treatment options discussed including CPAP/BiPAP machine, oral appliance, ProVent therapy, Oxy-Aid by [employer], new technologies, or positional sleep.\par \par problem 7: hand pain (quiet)\par -she was given the names of Dr. Gerson Breen and Dr. Darius Brody \par -recommended to have an evaluation with Shaquille Aguilar for hand splints or to have an evaluation for a orthotist \par -she can also try OTC wrist splints \par \par Problem 8: Cardiac\par -Continue to follow up with Dr. Weinberg.\par \par Problem 9: health maintenance\par -s/p influenza vaccine 2020\par -recommended strep pneumonia vaccines after age 65: Prevnar-13 vaccine, followed by Pneumo vaccine 23 one year following\par -recommended early intervention for URIs\par -recommended regular osteoporosis evaluations\par -recommended early dermatological evaluations\par -recommended after the age of 50 to the age of 70, colonoscopy every 5 years \par \par F/U in 4 months\par She is encouraged to call with any changes, concerns or questions. \par

## 2021-03-03 NOTE — REVIEW OF SYSTEMS
[Recent Wt Gain (___ Lbs)] : ~T recent [unfilled] lb weight gain [Negative] : Endocrine [Chest Discomfort] : no chest discomfort [GERD] : no gerd [Diarrhea] : no diarrhea [Constipation] : no constipation [Dysphagia] : no dysphagia [Dizziness] : no dizziness

## 2021-03-03 NOTE — ADDENDUM
[FreeTextEntry1] : Documented by Aniceto Begum acting as a scribe for Dr. Herber Day on 03/03/2021.\par \par All medical record entries made by the Scribe were at my, Dr. Herber Day's, direction and personally dictated by me on 03/03/2021 . I have reviewed the chart and agree that the record accurately reflects my personal performance of the history, physical exam, assessment and plan. I have also personally directed, reviewed, and agree with the discharge instructions.

## 2021-03-03 NOTE — PROCEDURE
[FreeTextEntry1] : Full PFT revealed mild restrictive and mild obstructive dysfunction, with a FEV1 of  1.83L, which is  69% of predicted, normal lung volumes, and a normal diffusion of 20.3 , which is  75% of predicted, with a normal flow volume loop \par \par FENO was 67; a normal value being less than 25\par Fractional exhaled nitric oxide (FENO) is regarded as a simple, noninvasive method for assessing eosinophilic airway inflammation. Produced by a variety of cells within the lung, nitric oxide (NO) concentrations are generally low in healthy individuals. However, high concentrations of NO appear to be involved in nonspecific host defense mechanisms and chronic inflammatory diseases such as asthma. The American Thoracic Society (ATS) therefore has recommended using FENO to aid in the diagnosis and monitoring of eosinophilic airway inflammation and asthma, and for identifying steroid responsive individuals whose chronic respiratory symptoms may be caused by airway inflammation.

## 2021-03-30 ENCOUNTER — LABORATORY RESULT (OUTPATIENT)
Age: 41
End: 2021-03-30

## 2021-03-31 ENCOUNTER — APPOINTMENT (OUTPATIENT)
Dept: CARDIOLOGY | Facility: CLINIC | Age: 41
End: 2021-03-31
Payer: COMMERCIAL

## 2021-03-31 VITALS
SYSTOLIC BLOOD PRESSURE: 122 MMHG | DIASTOLIC BLOOD PRESSURE: 80 MMHG | BODY MASS INDEX: 38.93 KG/M2 | RESPIRATION RATE: 15 BRPM | TEMPERATURE: 97.8 F | WEIGHT: 228 LBS | HEIGHT: 64 IN | OXYGEN SATURATION: 98 %

## 2021-03-31 PROCEDURE — 99213 OFFICE O/P EST LOW 20 MIN: CPT

## 2021-03-31 PROCEDURE — 99072 ADDL SUPL MATRL&STAF TM PHE: CPT

## 2021-03-31 RX ORDER — HUMAN INSULIN 100 [IU]/ML
(70-30) 100 INJECTION, SUSPENSION SUBCUTANEOUS
Refills: 0 | Status: COMPLETED | COMMUNITY
End: 2021-03-31

## 2021-03-31 RX ORDER — CHLORHEXIDINE GLUCONATE 4 %
325 (65 FE) LIQUID (ML) TOPICAL 3 TIMES DAILY
Qty: 90 | Refills: 0 | Status: COMPLETED | COMMUNITY
Start: 2020-05-27 | End: 2021-03-31

## 2021-03-31 NOTE — PHYSICAL EXAM
[General Appearance - Well Developed] : well developed [Normal Appearance] : normal appearance [Well Groomed] : well groomed [General Appearance - Well Nourished] : well nourished [No Deformities] : no deformities [General Appearance - In No Acute Distress] : no acute distress [Normal Conjunctiva] : the conjunctiva exhibited no abnormalities [Normal Oral Mucosa] : normal oral mucosa [Normal Oropharynx] : normal oropharynx [JVD Elevated _____cm] : JVD elevated [unfilled] ~U cm above clavicle [Normal Jugular Venous V Waves Present] : normal jugular venous V waves present [Respiration, Rhythm And Depth] : normal respiratory rhythm and effort [Exaggerated Use Of Accessory Muscles For Inspiration] : no accessory muscle use [Auscultation Breath Sounds / Voice Sounds] : lungs were clear to auscultation bilaterally [Bowel Sounds] : normal bowel sounds [Abdomen Soft] : soft [Abnormal Walk] : normal gait [Gait - Sufficient For Exercise Testing] : the gait was sufficient for exercise testing [Nail Clubbing] : no clubbing of the fingernails [Cyanosis, Localized] : no localized cyanosis [Skin Color & Pigmentation] : normal skin color and pigmentation [Skin Turgor] : normal skin turgor [] : no rash [Oriented To Time, Place, And Person] : oriented to person, place, and time [Affect] : the affect was normal [Mood] : the mood was normal [No Anxiety] : not feeling anxious [5th Left ICS - MCL] : palpated at the 5th LICS in the midclavicular line [Normal Rate] : normal [Normal S1] : normal S1 [Normal S2] : normal S2 [No Murmur] : no murmurs heard [I] : a grade 1 [2+] : left 2+ [No Abnormalities] : the abdominal aorta was not enlarged and no bruit was heard [No Pitting Edema] : no pitting edema present [FreeTextEntry1] : carotid murmur vs bruit [S3] : no S3 [S4] : no S4 [Right Carotid Bruit] : no bruit heard over the right carotid [Left Carotid Bruit] : no bruit heard over the left carotid

## 2021-03-31 NOTE — REASON FOR VISIT
[Follow-Up - Clinic] : a clinic follow-up of [Dyspnea] : dyspnea [Hypertension] : hypertension [Palpitations] : palpitations [FreeTextEntry1] : This is a 40-year-old female past medical history significant for insulin-dependent diabetes mellitus, hypertension, status post  section, status post nasal surgery, comes in for cardiac follow up evaluation. She denies syncope, chest pain, or dizziness. Blood pressure is well-controlled today. She has no cardiac complaints. She was seen by hematologist for mild anemia, thought to be secondary to anemia of chronic disease. She has received both doses of the covid vaccine.

## 2021-03-31 NOTE — DISCUSSION/SUMMARY
[FreeTextEntry1] : This is a 40-year-old female past medical history significant for insulin-dependent diabetes mellitus, hypertension, status post recent pneumonia ( 3 ) times  status post  section, status post nasal surgery, comes in for cardiac follow up evaluation.  \par The patient is maintaining a good diet, and trying to incorporate more exercise in her daily routine.\par She will have new blood work later today for lipid panel.  Her blood pressure is under good control.\par Blood work done 2020 demonstrated a cholesterol of 149, triglycerides of 63, HDL 61, and LDL direct of 73 mg/dL.\par Electrocardiogram done 2020 demonstrated normal sinus rhythm rate of 85 bpm is otherwise unremarkable.\par The patient had a Holter monitor done 2020 which demonstrated no significant arrhythmia or heart block.  Patient had 3 isolated atrial premature contractions and one premature ventricular contraction.\par \par Given the patient's diabetic state and increased LDL cholesterol, I have recommended she start on lipid-lowering therapy.  The patient is not breast-feeding and does not plan to get pregnant again.\par She clearly understands she not be on statin therapy (with Crestor), if she gets pregnant.  She understands she must contact me immediately if she is trying to get pregnant or gets pregnant accidentally and discontinue Crestor therapy.\par She has no history of rheumatic fever.  Her cardiac risk factors Include hypertension, insulin-dependent diabetes mellitus.\par \par The patient understands that aerobic exercises must be increased to 40 minutes 4 times per week. A detailed discussion of lifestyle modification was done today. The patient has a good understanding of the diagnosis, and treatment plan. Lifestyle modification was also outlined.

## 2021-03-31 NOTE — ASSESSMENT
[FreeTextEntry1] : 40 year old F with PMHx of T1DM, HTN, HLD, asthma and mild KAL who presents for cardiac evaluation.\par \par Her current cardiovascular risk factors are controlled at time. She is asymptomatic today from cardiac standpoint. Most recent A1c 6.3% on insulin, BP is at goal today on amlodipine 10 mg and olmesartan 40-HCTZ 25 mg daily.  She is getting new blood work today. Her echocardiogram and exercise ECG were normal in 2019. Her direct LDL-C was 73 mg/dL on crestor 10 mg. She is attempting to lose weight at this time. She was referred to Zoya Mitchell for dietary guidance. She will return in three months.

## 2021-04-02 ENCOUNTER — TRANSCRIPTION ENCOUNTER (OUTPATIENT)
Age: 41
End: 2021-04-02

## 2021-04-19 ENCOUNTER — RX RENEWAL (OUTPATIENT)
Age: 41
End: 2021-04-19

## 2021-06-14 ENCOUNTER — APPOINTMENT (OUTPATIENT)
Dept: INTERNAL MEDICINE | Facility: CLINIC | Age: 41
End: 2021-06-14
Payer: COMMERCIAL

## 2021-06-14 ENCOUNTER — APPOINTMENT (OUTPATIENT)
Dept: RADIOLOGY | Facility: HOSPITAL | Age: 41
End: 2021-06-14

## 2021-06-14 VITALS
HEART RATE: 100 BPM | TEMPERATURE: 97.6 F | HEIGHT: 64 IN | WEIGHT: 220 LBS | OXYGEN SATURATION: 98 % | BODY MASS INDEX: 37.56 KG/M2

## 2021-06-14 DIAGNOSIS — S69.91XA UNSPECIFIED INJURY OF RIGHT WRIST, HAND AND FINGER(S), INITIAL ENCOUNTER: ICD-10-CM

## 2021-06-14 PROCEDURE — 99213 OFFICE O/P EST LOW 20 MIN: CPT

## 2021-06-14 PROCEDURE — 99072 ADDL SUPL MATRL&STAF TM PHE: CPT

## 2021-06-14 NOTE — HISTORY OF PRESENT ILLNESS
[FreeTextEntry8] : 39 y/o F presents for acute medical evaluation with complaint of finger injury. 1 week ago while climbing down stairs in the dark tripped, hit right hand and knee on wall. Since then right 5th finger has been swollen/painful, has been icing, purchased OTC soft splint, swelling/pain slightly improved, but unable to extend tip of the finger. Right knee slight bruising, no pain on ambulation or to touch.

## 2021-06-14 NOTE — PHYSICAL EXAM
[Normal] : no acute distress, well nourished, well developed and well-appearing [de-identified] : Right 5th digit DIP stock in flexion, unable to actively extend, able to passively extend slightly but causes pain. R knee +ecchymoses otherwise normal knee exam

## 2021-06-14 NOTE — PLAN
[FreeTextEntry1] : Advised on icing affected finger, may continue soft OTC brace, Advil or Motrin on full stomach prn pain. Referral for xray and hand ortho.

## 2021-06-15 ENCOUNTER — APPOINTMENT (OUTPATIENT)
Dept: ORTHOPEDIC SURGERY | Facility: CLINIC | Age: 41
End: 2021-06-15
Payer: COMMERCIAL

## 2021-06-15 VITALS
BODY MASS INDEX: 37.56 KG/M2 | HEART RATE: 106 BPM | DIASTOLIC BLOOD PRESSURE: 97 MMHG | HEIGHT: 64 IN | WEIGHT: 220 LBS | SYSTOLIC BLOOD PRESSURE: 144 MMHG

## 2021-06-15 PROCEDURE — 73140 X-RAY EXAM OF FINGER(S): CPT | Mod: F9

## 2021-06-15 PROCEDURE — 99072 ADDL SUPL MATRL&STAF TM PHE: CPT

## 2021-06-15 PROCEDURE — 99204 OFFICE O/P NEW MOD 45 MIN: CPT

## 2021-06-15 NOTE — PHYSICAL EXAM
[Rad] : radial 2+ and symmetric bilaterally [Normal] : Alert and in no acute distress [Poor Appearance] : well-appearing [Acute Distress] : not in acute distress [de-identified] : The patient has no respiratory distress. Mood and affect are normal. The patient is alert and oriented to person, place and time.\par Examination of the cervical spine demonstrates no tenderness, no deformity and no muscle spasm. Cervical spine rotation is 60° to the right, 60° to the left, 75° of extension and 45° of flexion. Neurologic exam of the upper extremities reveals intact sensation to light touch. Motor function is 5 over 5 in all groups. Deep tendon reflexes are 2+ and equal at the biceps, triceps and brachioradialis.\par Examination of the right shoulder demonstrates no swelling, no deformity and no tenderness. The shoulder is stable. Drop arm test is negative. Wasatch test is negative. Liftoff test is negative. Motor strength is 5 over 5 in all groups. Range of motion is full and identical to that of the left shoulder. Flexion is 160°, abduction 160°, external rotation 45° and internal rotation to the lower thoracic level.  There is no pain with motion of the elbows.  There is no elbow instability.  Examination of the right wrist and hand is notable for the right small finger which has a mallet deformity at the DIP joint.  There is tenderness of the distal phalanx.  She is unable to fully extend the DIP.  She does have flexion.  The other fingers are nontender with normal function.  The skin is intact.  There is no lymphedema. [de-identified] : The patient presents with x-rays of the right hand dated June 14, 2021 which demonstrate bony mallet finger deformity of the distal phalanx of the right small finger\par

## 2021-06-15 NOTE — DISCUSSION/SUMMARY
[de-identified] : The patient has bony mallet finger of the right small finger.  I have discussed the pathology, natural history and treatment options with her.  The DIP joint is fully extended in a splint to reduce the avulsion fragment of the distal phalanx.  Post reduction x-rays confirm improved reduction but not anatomic reduction.  I have discussed the pathology and natural history with the patient.  She may have a satisfactory result even without an anatomic reduction.  There is a chance that despite extension splinting she will have continued mallet deformity.  She may require surgery at some point in the future.  I have offered her referral to our hand surgeon if she is interested in early surgery.  She agrees with the plan to attempt nonoperative treatment first.  She will be reevaluated with new x-rays in 2 weeks.

## 2021-06-15 NOTE — HISTORY OF PRESENT ILLNESS
[de-identified] : 40 year old RHD female presents for evaluation of an injury to the right small finger sustained 6/8/21 when she fell down steps and struck her hand against the wall. He finger swelled and was painful. After a few days she noticed a deformity at the DIP. She was referred by her PCP and was advised to follow up with a hand specialist. She is taking Tylenol for pain. She is trying not to use her hand for activities. She denies history of prior hand injury.

## 2021-06-29 ENCOUNTER — APPOINTMENT (OUTPATIENT)
Dept: ORTHOPEDIC SURGERY | Facility: CLINIC | Age: 41
End: 2021-06-29

## 2021-06-30 ENCOUNTER — APPOINTMENT (OUTPATIENT)
Dept: ORTHOPEDIC SURGERY | Facility: CLINIC | Age: 41
End: 2021-06-30
Payer: COMMERCIAL

## 2021-06-30 VITALS
BODY MASS INDEX: 37.56 KG/M2 | HEART RATE: 80 BPM | SYSTOLIC BLOOD PRESSURE: 140 MMHG | HEIGHT: 64 IN | WEIGHT: 220 LBS | DIASTOLIC BLOOD PRESSURE: 94 MMHG

## 2021-06-30 PROCEDURE — 99072 ADDL SUPL MATRL&STAF TM PHE: CPT

## 2021-06-30 PROCEDURE — 73140 X-RAY EXAM OF FINGER(S): CPT | Mod: F9

## 2021-06-30 PROCEDURE — 99214 OFFICE O/P EST MOD 30 MIN: CPT

## 2021-07-07 ENCOUNTER — OUTPATIENT (OUTPATIENT)
Dept: OUTPATIENT SERVICES | Facility: HOSPITAL | Age: 41
LOS: 1 days | End: 2021-07-07
Payer: COMMERCIAL

## 2021-07-07 ENCOUNTER — APPOINTMENT (OUTPATIENT)
Dept: PHYSICAL MEDICINE AND REHAB | Facility: CLINIC | Age: 41
End: 2021-07-07
Payer: COMMERCIAL

## 2021-07-07 VITALS
OXYGEN SATURATION: 100 % | DIASTOLIC BLOOD PRESSURE: 80 MMHG | TEMPERATURE: 98 F | RESPIRATION RATE: 16 BRPM | HEART RATE: 90 BPM | SYSTOLIC BLOOD PRESSURE: 132 MMHG | HEIGHT: 62 IN | WEIGHT: 224.43 LBS

## 2021-07-07 VITALS
DIASTOLIC BLOOD PRESSURE: 85 MMHG | TEMPERATURE: 97.9 F | HEART RATE: 112 BPM | SYSTOLIC BLOOD PRESSURE: 123 MMHG | OXYGEN SATURATION: 98 %

## 2021-07-07 DIAGNOSIS — M20.019 MALLET FINGER OF UNSPECIFIED FINGER(S): ICD-10-CM

## 2021-07-07 DIAGNOSIS — Z01.818 ENCOUNTER FOR OTHER PREPROCEDURAL EXAMINATION: ICD-10-CM

## 2021-07-07 DIAGNOSIS — I10 ESSENTIAL (PRIMARY) HYPERTENSION: ICD-10-CM

## 2021-07-07 DIAGNOSIS — J45.909 UNSPECIFIED ASTHMA, UNCOMPLICATED: ICD-10-CM

## 2021-07-07 DIAGNOSIS — E11.9 TYPE 2 DIABETES MELLITUS WITHOUT COMPLICATIONS: ICD-10-CM

## 2021-07-07 DIAGNOSIS — Z98.890 OTHER SPECIFIED POSTPROCEDURAL STATES: Chronic | ICD-10-CM

## 2021-07-07 DIAGNOSIS — Z98.89 OTHER SPECIFIED POSTPROCEDURAL STATES: Chronic | ICD-10-CM

## 2021-07-07 DIAGNOSIS — G56.01 CARPAL TUNNEL SYNDROME, RIGHT UPPER LIMB: ICD-10-CM

## 2021-07-07 DIAGNOSIS — M20.011 MALLET FINGER OF RIGHT FINGER(S): ICD-10-CM

## 2021-07-07 LAB
A1C WITH ESTIMATED AVERAGE GLUCOSE RESULT: 6.1 % — HIGH (ref 4–5.6)
ANION GAP SERPL CALC-SCNC: 13 MMOL/L — SIGNIFICANT CHANGE UP (ref 5–17)
BUN SERPL-MCNC: 15 MG/DL — SIGNIFICANT CHANGE UP (ref 7–23)
CALCIUM SERPL-MCNC: 9.3 MG/DL — SIGNIFICANT CHANGE UP (ref 8.4–10.5)
CHLORIDE SERPL-SCNC: 102 MMOL/L — SIGNIFICANT CHANGE UP (ref 96–108)
CO2 SERPL-SCNC: 24 MMOL/L — SIGNIFICANT CHANGE UP (ref 22–31)
CREAT SERPL-MCNC: 0.92 MG/DL — SIGNIFICANT CHANGE UP (ref 0.5–1.3)
ESTIMATED AVERAGE GLUCOSE: 128 MG/DL — HIGH (ref 68–114)
GLUCOSE SERPL-MCNC: 125 MG/DL — HIGH (ref 70–99)
HCT VFR BLD CALC: 36.3 % — SIGNIFICANT CHANGE UP (ref 34.5–45)
HGB BLD-MCNC: 11.2 G/DL — LOW (ref 11.5–15.5)
MCHC RBC-ENTMCNC: 27.4 PG — SIGNIFICANT CHANGE UP (ref 27–34)
MCHC RBC-ENTMCNC: 30.9 GM/DL — LOW (ref 32–36)
MCV RBC AUTO: 88.8 FL — SIGNIFICANT CHANGE UP (ref 80–100)
NRBC # BLD: 0 /100 WBCS — SIGNIFICANT CHANGE UP (ref 0–0)
PLATELET # BLD AUTO: 207 K/UL — SIGNIFICANT CHANGE UP (ref 150–400)
POTASSIUM SERPL-MCNC: 4.5 MMOL/L — SIGNIFICANT CHANGE UP (ref 3.5–5.3)
POTASSIUM SERPL-SCNC: 4.5 MMOL/L — SIGNIFICANT CHANGE UP (ref 3.5–5.3)
RBC # BLD: 4.09 M/UL — SIGNIFICANT CHANGE UP (ref 3.8–5.2)
RBC # FLD: 13.4 % — SIGNIFICANT CHANGE UP (ref 10.3–14.5)
SODIUM SERPL-SCNC: 139 MMOL/L — SIGNIFICANT CHANGE UP (ref 135–145)
WBC # BLD: 8.19 K/UL — SIGNIFICANT CHANGE UP (ref 3.8–10.5)
WBC # FLD AUTO: 8.19 K/UL — SIGNIFICANT CHANGE UP (ref 3.8–10.5)

## 2021-07-07 PROCEDURE — 83036 HEMOGLOBIN GLYCOSYLATED A1C: CPT

## 2021-07-07 PROCEDURE — G0463: CPT

## 2021-07-07 PROCEDURE — 85027 COMPLETE CBC AUTOMATED: CPT

## 2021-07-07 PROCEDURE — 95886 MUSC TEST DONE W/N TEST COMP: CPT

## 2021-07-07 PROCEDURE — 99072 ADDL SUPL MATRL&STAF TM PHE: CPT

## 2021-07-07 PROCEDURE — 95885 MUSC TST DONE W/NERV TST LIM: CPT | Mod: 59

## 2021-07-07 PROCEDURE — 95911 NRV CNDJ TEST 9-10 STUDIES: CPT

## 2021-07-07 PROCEDURE — 80048 BASIC METABOLIC PNL TOTAL CA: CPT

## 2021-07-07 RX ORDER — LIDOCAINE HCL 20 MG/ML
0.2 VIAL (ML) INJECTION ONCE
Refills: 0 | Status: DISCONTINUED | OUTPATIENT
Start: 2021-07-13 | End: 2021-07-27

## 2021-07-07 RX ORDER — SODIUM CHLORIDE 9 MG/ML
3 INJECTION INTRAMUSCULAR; INTRAVENOUS; SUBCUTANEOUS EVERY 8 HOURS
Refills: 0 | Status: DISCONTINUED | OUTPATIENT
Start: 2021-07-13 | End: 2021-07-27

## 2021-07-07 NOTE — H&P PST ADULT - HISTORY OF PRESENT ILLNESS
40 yo female, PMH  40 yo coming down [address] slipped and fell 6/8, one week later finger still swollen and deformed, PCP, x-ray, splint on, one week ago repeated x-ray and needs surgical intervention 40 yo female, PMH HTN, HLD, DM1, GERD, on 6/8/21 pt. reports missing last 2 steps of a staircase and falling on her right small finger, after one week of being swollen, pt. had x-ray done which revealed a fracture, a splint was placed but x-ray one week ago is unchanged and pt. needs surgical intervention. Pt. presents to PST for scheduled Right Endoscopic Carpal Tunnel Release, Right Small Finger Closed Reduction Percutaneous Pinning on 7/13/21. Pt. denies COVID-19 infection in 2020/2021, denies close contact with anyone that has been ill, no fever, cough, dyspnea in past two weeks.    Pt. is fully vaccinated, Pfizer 2nd dose 3/23/21 40 yo female, PMH HTN, HLD, DM1, GERD, on 6/8/21 pt. reports missing last 2 steps of a staircase and falling on her right small finger, after one week of being swollen, pt. had x-ray done which revealed a fracture, a splint was placed but x-ray one week ago is unchanged and pt. needs surgical intervention. Pt. presents to PST for scheduled Right Endoscopic Carpal Tunnel Release, Right Small Finger Closed Reduction Percutaneous Pinning on 7/13/21. Pt. denies COVID-19 infection in 2020/2021, denies close contact with anyone that has been ill, no fever, cough, dyspnea in past two weeks.    Pt. is fully vaccinated, Pfizer 2nd dose 3/23/21 - copy of card in chart    Pt. will reach out to endocrinologist for insulin pump plan for DOS

## 2021-07-07 NOTE — H&P PST ADULT - MUSCULOSKELETAL
right small finger/decreased ROM due to pain detailed exam details… right small finger fracture/decreased ROM due to pain

## 2021-07-07 NOTE — PHYSICAL EXAM
[FreeTextEntry1] : Gen: NAD\par Neck: supple, mild tenderness to lower cervical paraspinals, ROM limited by pain, neg spurling bilaterally\par CV: no cyanosis\par Pulm: breathing well on room air\par Abd: soft\par R hands: +tinel's, +phalen's, +compression, FAROM, sensation intact to light touch in m/u/r distributions, negative cubital tunnel maneuvers, FAROM, normal muscle bulk, 5/5 strength hand , finger and thumb opposition/abduction/adduction/flexion/extension, wrist flexion/extension/supination/pronation, no evidence of clawing, normal muscle bulk\par L hands: +tinel's, +phalen's, +compression, FAROM, sensation intact to light touch in m/u/r distributions, negative cubital tunnel maneuvers, FAROM, normal muscle bulk, 5/5 strength hand , finger and thumb opposition/abduction/adduction/flexion/extension, wrist flexion/extension/supination/pronation, no evidence of clawing, normal muscle bulk\par Msk: \par 5/5 hip flexion B/L, 5/5 knee extension B/L, 5/5 knee flexion B/L, 5/5 dorsiflexion B/L, 5/5 plantar flexion B/L\par 5/5 shoulder abduction B/L, 5/5 elbow flexion B/L, 5/5 elbow extension B/L, 5/5 wrist extension B/L, 5/5 hand  B/L\par Neuro: sensation intact to light touch in bilateral upper and lower extremities, reflexes 2+ brachioradialis, biceps, triceps bilaterally, reflexes 2+ patella, medial hamstring, achilles bilaterally, negative babinski, negative boateng\par

## 2021-07-07 NOTE — H&P PST ADULT - NSICDXPROBLEM_GEN_ALL_CORE_FT
PROBLEM DIAGNOSES  Problem: Mallet finger  Assessment and Plan: Right Endoscopic Carpal Tunnel Release, Right Small Finger Closed Reduction Percutaneous Pinning on 7/13/21  Pre-op instructions, including Clorhexidine soap, provided - all questions answered    Problem: Hypertension  Assessment and Plan: Continue BP medications as indicated, including am of surgery with sip of water    Problem: DM2 (diabetes mellitus, type 2)  Assessment and Plan: Pt. will reach out to endocrinologist and a plan will be faxed to [facility]    Problem: Asthma  Assessment and Plan: Continue inhalers as indicated, pt. will bring rescue inhaler am of surgery

## 2021-07-07 NOTE — H&P PST ADULT - NSANTHOSAYNRD_GEN_A_CORE
No. KAL screening performed.  STOP BANG Legend: 0-2 = LOW Risk; 3-4 = INTERMEDIATE Risk; 5-8 = HIGH Risk

## 2021-07-07 NOTE — HISTORY OF PRESENT ILLNESS
[FreeTextEntry1] : 40 yo F who presents with bilateral hand paresthesia and numbness.  Patient reports that she has been experiencing symptoms for greater than 10 years but with progression over the last year.  Patient reports that both hands are affected equally.  Patient reports that the symptoms are worse with prolonged or repetitive use of the hand and is associated with nocturnal awakening.  Patient reports +flick sign.  Patient has worn bilateral neutral wrist splints but this has made the pain worse.  Patient has had a recent fall down her stairs resulting in a Mallet finger and is scheduled for surgical intervention with Dr. Reilly on 7/13.  Patient with some chronic neck pain but non-radiating.  Patient denies new weakness, numbness or paresthesia.  Denies bowel/bladder dysfunction, fevers, chills, weight loss, night pain, or night sweats.

## 2021-07-07 NOTE — H&P PST ADULT - OTHER CARE PROVIDERS
Dr. Lory Rodney ; Dr. Day, pulmonologist, Dr. ANANDA Weinberg, cardiology Dr. Vera, endocrinology, [phone]; Dr. Day, pulmonologist, Dr. ANANDA Weinberg, cardiology

## 2021-07-07 NOTE — H&P PST ADULT - NSICDXFAMILYHX_GEN_ALL_CORE_FT
FAMILY HISTORY:  Family history of arthritis, Mother -  age 61  Family history of brain aneurysm, Mother -  age 61  Family history of congestive heart failure, Father  Hypercholesterolemia, Father  Hypertension, FATHER -  AGE 76

## 2021-07-07 NOTE — H&P PST ADULT - NSICDXPASTMEDICALHX_GEN_ALL_CORE_FT
PAST MEDICAL HISTORY:  Asthma never hospitalized    Cervical disc disease bulging disc - sees chiropractor as needed    Chronic sinusitis resolved after surgery    Herniated lumbar intervertebral disc X 3, sees chiropractor as needed    Hypertension well controlled with medication    Type 1 diabetes mellitus INSULIN PUMP since 2014     PAST MEDICAL HISTORY:  Asthma never hospitalized    Cervical disc disease bulging disc - sees chiropractor as needed    Chronic sinusitis resolved after surgery    COVID-19 vaccine series completed Pfizer 2nd dose 3/23/21    Herniated lumbar intervertebral disc X 3, sees chiropractor as needed    Hypertension well controlled with medication    Type 1 diabetes mellitus INSULIN PUMP since 2014

## 2021-07-07 NOTE — H&P PST ADULT - HIV STATUS
Reason for Call:  Symptoms     Detailed comments: back pain, Would like a same day appointment    Phone Number Patient can be reached at: Home number on file [phone] (home)    Best Time:     Can we leave a detailed message on this number? YES    Call taken on 1/30/2017 at 9:08 AM by Hilary Roach      
written material/verbal instruction
Negative/Unknown

## 2021-07-07 NOTE — ASSESSMENT
[FreeTextEntry1] : 42 yo F who presents for EMG/NCS with bilateral hand paresthesia and numbness.\par \par -EMG/NCS performed this PM showing electrodiagnostic evidence of a moderate right and moderate-severe left median neuropathy at the wrist consistent with bilateral carpal tunnel syndrome.\par -Full EMG/NCS report completed.  As patient is scheduled for surgery on 7/13, I have asked office staff to upload this report ASAP.\par -Patient instructed to follow up with Dr. Reilly for further management and treatment.\par \par Heath Singh MD\par Spine and Sports Medicine\par \par [employer] [facility] [employer] [facility] [employer] [facility]\par [facility] [place]\par \par

## 2021-07-08 ENCOUNTER — TRANSCRIPTION ENCOUNTER (OUTPATIENT)
Age: 41
End: 2021-07-08

## 2021-07-09 PROBLEM — Z92.29 PERSONAL HISTORY OF OTHER DRUG THERAPY: Chronic | Status: ACTIVE | Noted: 2021-07-07

## 2021-07-12 ENCOUNTER — APPOINTMENT (OUTPATIENT)
Dept: ENDOCRINOLOGY | Facility: CLINIC | Age: 41
End: 2021-07-12
Payer: COMMERCIAL

## 2021-07-12 ENCOUNTER — TRANSCRIPTION ENCOUNTER (OUTPATIENT)
Age: 41
End: 2021-07-12

## 2021-07-12 PROCEDURE — 99072 ADDL SUPL MATRL&STAF TM PHE: CPT

## 2021-07-12 PROCEDURE — G0108 DIAB MANAGE TRN  PER INDIV: CPT

## 2021-07-12 RX ORDER — SODIUM CHLORIDE 9 MG/ML
1000 INJECTION, SOLUTION INTRAVENOUS
Refills: 0 | Status: DISCONTINUED | OUTPATIENT
Start: 2021-07-13 | End: 2021-07-27

## 2021-07-12 NOTE — ASU DISCHARGE PLAN (ADULT/PEDIATRIC) - NURSING INSTRUCTIONS
You were given Tylenol during your visit, the next dose of Tylenol will be on or after ______7:30 PM_____ ,today/tonight and every 6 hours afterwards for pain management, do not take any Tylenol containing products until this time. Do not exceed more than 4000mg of Tylenol in one 24 hour setting. If no contraindications, you may alternate with Ibuprofen or Naproxen 3 hours after dose of Tylenol. Ibuprofen can be taken every 6 hours, next dose on or after ____7:15 PM___. Naproxen may be taken every 12 hours.

## 2021-07-12 NOTE — ASU DISCHARGE PLAN (ADULT/PEDIATRIC) - CARE PROVIDER_API CALL
Radha Reilly)  [address], [facility] [address]  [place] [address]  Phone: [phone]  Fax: [phone]  Follow Up Time:

## 2021-07-13 ENCOUNTER — APPOINTMENT (OUTPATIENT)
Dept: ORTHOPEDIC SURGERY | Facility: HOSPITAL | Age: 41
End: 2021-07-13

## 2021-07-13 ENCOUNTER — OUTPATIENT (OUTPATIENT)
Dept: OUTPATIENT SERVICES | Facility: HOSPITAL | Age: 41
LOS: 1 days | End: 2021-07-13
Payer: COMMERCIAL

## 2021-07-13 VITALS
TEMPERATURE: 99 F | SYSTOLIC BLOOD PRESSURE: 117 MMHG | DIASTOLIC BLOOD PRESSURE: 70 MMHG | RESPIRATION RATE: 15 BRPM | HEART RATE: 101 BPM | OXYGEN SATURATION: 97 %

## 2021-07-13 VITALS
DIASTOLIC BLOOD PRESSURE: 81 MMHG | WEIGHT: 224.43 LBS | RESPIRATION RATE: 14 BRPM | TEMPERATURE: 98 F | SYSTOLIC BLOOD PRESSURE: 121 MMHG | HEIGHT: 62 IN | HEART RATE: 101 BPM | OXYGEN SATURATION: 99 %

## 2021-07-13 DIAGNOSIS — Z98.89 OTHER SPECIFIED POSTPROCEDURAL STATES: Chronic | ICD-10-CM

## 2021-07-13 DIAGNOSIS — Z98.890 OTHER SPECIFIED POSTPROCEDURAL STATES: Chronic | ICD-10-CM

## 2021-07-13 DIAGNOSIS — M20.011 MALLET FINGER OF RIGHT FINGER(S): ICD-10-CM

## 2021-07-13 DIAGNOSIS — G56.01 CARPAL TUNNEL SYNDROME, RIGHT UPPER LIMB: ICD-10-CM

## 2021-07-13 LAB — GLUCOSE BLDC GLUCOMTR-MCNC: 123 MG/DL — HIGH (ref 70–99)

## 2021-07-13 PROCEDURE — 29848 WRIST ENDOSCOPY/SURGERY: CPT | Mod: RT

## 2021-07-13 PROCEDURE — 26756 PIN FINGER FRACTURE EACH: CPT | Mod: F9

## 2021-07-13 PROCEDURE — 76000 FLUOROSCOPY <1 HR PHYS/QHP: CPT

## 2021-07-13 PROCEDURE — C1713: CPT

## 2021-07-13 PROCEDURE — 82962 GLUCOSE BLOOD TEST: CPT

## 2021-07-13 RX ORDER — CHLORHEXIDINE GLUCONATE 213 G/1000ML
1 SOLUTION TOPICAL ONCE
Refills: 0 | Status: COMPLETED | OUTPATIENT
Start: 2021-07-13 | End: 2021-07-13

## 2021-07-13 RX ORDER — CEFAZOLIN SODIUM 1 G
2000 VIAL (EA) INJECTION ONCE
Refills: 0 | Status: COMPLETED | OUTPATIENT
Start: 2021-07-13 | End: 2021-07-13

## 2021-07-13 RX ORDER — FENTANYL CITRATE 50 UG/ML
25 INJECTION INTRAVENOUS
Refills: 0 | Status: DISCONTINUED | OUTPATIENT
Start: 2021-07-13 | End: 2021-07-13

## 2021-07-13 RX ORDER — ONDANSETRON 8 MG/1
4 TABLET, FILM COATED ORAL ONCE
Refills: 0 | Status: DISCONTINUED | OUTPATIENT
Start: 2021-07-13 | End: 2021-07-27

## 2021-07-13 RX ORDER — OXYCODONE HYDROCHLORIDE 5 MG/1
5 TABLET ORAL ONCE
Refills: 0 | Status: DISCONTINUED | OUTPATIENT
Start: 2021-07-13 | End: 2021-07-13

## 2021-07-13 RX ADMIN — CHLORHEXIDINE GLUCONATE 1 APPLICATION(S): 213 SOLUTION TOPICAL at 12:28

## 2021-07-13 NOTE — BRIEF OPERATIVE NOTE - NSICDXBRIEFPROCEDURE_GEN_ALL_CORE_FT
PROCEDURES:  Endoscopic carpal tunnel release of right wrist 13-Jul-2021 14:25:06  Jam Patton  Closed reduction and percutaneous pinning (CRPP) of fracture of finger 13-Jul-2021 14:25:44  Jam Patton

## 2021-07-13 NOTE — ASU PATIENT PROFILE, ADULT - PMH
Asthma  never hospitalized  Cervical disc disease  bulging disc - sees chiropractor as needed  Chronic sinusitis  resolved after surgery  COVID-19 vaccine series completed  Pfizer 2nd dose 3/23/21  Herniated lumbar intervertebral disc  X 3, sees chiropractor as needed  Hypertension  well controlled with medication  Type 1 diabetes mellitus  INSULIN PUMP since 2014

## 2021-07-13 NOTE — BRIEF OPERATIVE NOTE - OPERATION/FINDINGS
endoscopic R CTR performed, CRPP of R small finger DIP joint in extension performed for Mallet fracture

## 2021-07-13 NOTE — BRIEF OPERATIVE NOTE - NSICDXBRIEFPOSTOP_GEN_ALL_CORE_FT
POST-OP DIAGNOSIS:  Right carpal tunnel syndrome 13-Jul-2021 14:26:33  Jam Patton  Mallet deformity of right little finger 13-Jul-2021 14:26:46  Jam Patton

## 2021-07-13 NOTE — BRIEF OPERATIVE NOTE - NSICDXBRIEFPREOP_GEN_ALL_CORE_FT
PRE-OP DIAGNOSIS:  Right carpal tunnel syndrome 13-Jul-2021 14:26:02  Jam Patton  Mallet deformity of right little finger 13-Jul-2021 14:26:21  Jam Patton

## 2021-07-22 ENCOUNTER — APPOINTMENT (OUTPATIENT)
Dept: ORTHOPEDIC SURGERY | Facility: CLINIC | Age: 41
End: 2021-07-22
Payer: COMMERCIAL

## 2021-07-22 VITALS
HEIGHT: 64 IN | OXYGEN SATURATION: 98 % | WEIGHT: 220 LBS | DIASTOLIC BLOOD PRESSURE: 83 MMHG | HEART RATE: 87 BPM | SYSTOLIC BLOOD PRESSURE: 120 MMHG | BODY MASS INDEX: 37.56 KG/M2

## 2021-07-22 PROCEDURE — 73140 X-RAY EXAM OF FINGER(S): CPT | Mod: 26,F9

## 2021-07-22 PROCEDURE — 99024 POSTOP FOLLOW-UP VISIT: CPT

## 2021-07-22 PROCEDURE — 29130 APPL FINGER SPLINT STATIC: CPT | Mod: 58,F9

## 2021-07-26 ENCOUNTER — APPOINTMENT (OUTPATIENT)
Dept: ORTHOPEDIC SURGERY | Facility: CLINIC | Age: 41
End: 2021-07-26
Payer: COMMERCIAL

## 2021-07-26 PROCEDURE — 99024 POSTOP FOLLOW-UP VISIT: CPT

## 2021-07-26 PROCEDURE — 73140 X-RAY EXAM OF FINGER(S): CPT | Mod: F9

## 2021-07-26 PROCEDURE — 29130 APPL FINGER SPLINT STATIC: CPT | Mod: 58,F9

## 2021-07-27 ENCOUNTER — RESULT CHARGE (OUTPATIENT)
Age: 41
End: 2021-07-27

## 2021-07-27 ENCOUNTER — OUTPATIENT (OUTPATIENT)
Dept: OUTPATIENT SERVICES | Facility: HOSPITAL | Age: 41
LOS: 1 days | End: 2021-07-27
Payer: COMMERCIAL

## 2021-07-27 VITALS
RESPIRATION RATE: 20 BRPM | OXYGEN SATURATION: 99 % | DIASTOLIC BLOOD PRESSURE: 75 MMHG | HEIGHT: 64.17 IN | HEART RATE: 106 BPM | SYSTOLIC BLOOD PRESSURE: 108 MMHG | WEIGHT: 223.11 LBS | TEMPERATURE: 99 F

## 2021-07-27 DIAGNOSIS — Z98.890 OTHER SPECIFIED POSTPROCEDURAL STATES: Chronic | ICD-10-CM

## 2021-07-27 DIAGNOSIS — G56.00 CARPAL TUNNEL SYNDROME, UNSPECIFIED UPPER LIMB: ICD-10-CM

## 2021-07-27 DIAGNOSIS — E10.9 TYPE 1 DIABETES MELLITUS WITHOUT COMPLICATIONS: ICD-10-CM

## 2021-07-27 DIAGNOSIS — Z01.818 ENCOUNTER FOR OTHER PREPROCEDURAL EXAMINATION: ICD-10-CM

## 2021-07-27 DIAGNOSIS — Z98.89 OTHER SPECIFIED POSTPROCEDURAL STATES: Chronic | ICD-10-CM

## 2021-07-27 DIAGNOSIS — J45.909 UNSPECIFIED ASTHMA, UNCOMPLICATED: ICD-10-CM

## 2021-07-27 DIAGNOSIS — I10 ESSENTIAL (PRIMARY) HYPERTENSION: ICD-10-CM

## 2021-07-27 DIAGNOSIS — G56.01 CARPAL TUNNEL SYNDROME, RIGHT UPPER LIMB: ICD-10-CM

## 2021-07-27 DIAGNOSIS — G56.02 CARPAL TUNNEL SYNDROME, LEFT UPPER LIMB: ICD-10-CM

## 2021-07-27 LAB
ANION GAP SERPL CALC-SCNC: 12 MMOL/L — SIGNIFICANT CHANGE UP (ref 5–17)
BUN SERPL-MCNC: 16 MG/DL — SIGNIFICANT CHANGE UP (ref 7–23)
CALCIUM SERPL-MCNC: 10 MG/DL — SIGNIFICANT CHANGE UP (ref 8.4–10.5)
CHLORIDE SERPL-SCNC: 101 MMOL/L — SIGNIFICANT CHANGE UP (ref 96–108)
CO2 SERPL-SCNC: 25 MMOL/L — SIGNIFICANT CHANGE UP (ref 22–31)
CREAT SERPL-MCNC: 0.98 MG/DL — SIGNIFICANT CHANGE UP (ref 0.5–1.3)
GLUCOSE SERPL-MCNC: 82 MG/DL — SIGNIFICANT CHANGE UP (ref 70–99)
HCT VFR BLD CALC: 35.7 % — SIGNIFICANT CHANGE UP (ref 34.5–45)
HGB BLD-MCNC: 11.6 G/DL — SIGNIFICANT CHANGE UP (ref 11.5–15.5)
MCHC RBC-ENTMCNC: 29.1 PG — SIGNIFICANT CHANGE UP (ref 27–34)
MCHC RBC-ENTMCNC: 32.5 GM/DL — SIGNIFICANT CHANGE UP (ref 32–36)
MCV RBC AUTO: 89.7 FL — SIGNIFICANT CHANGE UP (ref 80–100)
NRBC # BLD: 0 /100 WBCS — SIGNIFICANT CHANGE UP (ref 0–0)
PLATELET # BLD AUTO: 255 K/UL — SIGNIFICANT CHANGE UP (ref 150–400)
POTASSIUM SERPL-MCNC: 4.2 MMOL/L — SIGNIFICANT CHANGE UP (ref 3.5–5.3)
POTASSIUM SERPL-SCNC: 4.2 MMOL/L — SIGNIFICANT CHANGE UP (ref 3.5–5.3)
RBC # BLD: 3.98 M/UL — SIGNIFICANT CHANGE UP (ref 3.8–5.2)
RBC # FLD: 13.2 % — SIGNIFICANT CHANGE UP (ref 10.3–14.5)
SODIUM SERPL-SCNC: 138 MMOL/L — SIGNIFICANT CHANGE UP (ref 135–145)
WBC # BLD: 9.64 K/UL — SIGNIFICANT CHANGE UP (ref 3.8–10.5)
WBC # FLD AUTO: 9.64 K/UL — SIGNIFICANT CHANGE UP (ref 3.8–10.5)

## 2021-07-27 PROCEDURE — 85027 COMPLETE CBC AUTOMATED: CPT

## 2021-07-27 PROCEDURE — G0463: CPT

## 2021-07-27 PROCEDURE — 80048 BASIC METABOLIC PNL TOTAL CA: CPT

## 2021-07-27 RX ORDER — LIDOCAINE HCL 20 MG/ML
0.2 VIAL (ML) INJECTION ONCE
Refills: 0 | Status: DISCONTINUED | OUTPATIENT
Start: 2021-08-10 | End: 2021-08-24

## 2021-07-27 RX ORDER — SODIUM CHLORIDE 9 MG/ML
3 INJECTION INTRAMUSCULAR; INTRAVENOUS; SUBCUTANEOUS EVERY 8 HOURS
Refills: 0 | Status: DISCONTINUED | OUTPATIENT
Start: 2021-08-10 | End: 2021-08-24

## 2021-07-27 RX ORDER — CHLORHEXIDINE GLUCONATE 213 G/1000ML
1 SOLUTION TOPICAL ONCE
Refills: 0 | Status: DISCONTINUED | OUTPATIENT
Start: 2021-08-10 | End: 2021-08-24

## 2021-07-27 NOTE — H&P PST ADULT - NSICDXPROBLEM_GEN_ALL_CORE_FT
Patient tolerated procedure well. PROBLEM DIAGNOSES  Problem: Carpal tunnel syndrome  Assessment and Plan:  Left endoscopic carpal tunnel release on 8/10/2021.       Problem: DM (diabetes mellitus), type 1  Assessment and Plan: Check fingerstick DOS  Endo eval in chart - pump to be placed in excercise mode from time she is NPO through when she is able to eat again .       Problem: HTN (hypertension)  Assessment and Plan: Continue on antihypertensive medication      Problem: Asthma  Assessment and Plan: Continue on inhalers

## 2021-07-27 NOTE — H&P PST ADULT - HISTORY OF PRESENT ILLNESS
42 yo female, with history of HTN, Type 1 DM, Asthma , with recent surgery 7/13/2021 for right carpal tunnel release & closed reduction & pinning of right small finger, with c/o left hand numbness & swelling, Coming in for Left endoscopic carpal tunnel release on 8/10/2021.     Denies Recent travel, Exposure or Covid symptoms  COVID VACCINE LAST DOSE 3/23/2021

## 2021-07-27 NOTE — H&P PST ADULT - NSICDXPASTMEDICALHX_GEN_ALL_CORE_FT
PAST MEDICAL HISTORY:  Asthma never hospitalized    Cervical disc disease bulging disc - sees chiropractor as needed    Chronic sinusitis resolved after surgery    COVID-19 vaccine series completed Pfizer 2nd dose 3/23/21    Herniated lumbar intervertebral disc X 3, sees chiropractor as needed    Hypertension well controlled with medication    Type 1 diabetes mellitus INSULIN PUMP since 2014

## 2021-07-27 NOTE — H&P PST ADULT - NSICDXPASTSURGICALHX_GEN_ALL_CORE_FT
PAST SURGICAL HISTORY:  H/O  section 2007,     H/O sinus surgery 2015    S/P carpal tunnel release Right & right small finger pinning 2021

## 2021-07-28 ENCOUNTER — APPOINTMENT (OUTPATIENT)
Dept: PULMONOLOGY | Facility: CLINIC | Age: 41
End: 2021-07-28
Payer: COMMERCIAL

## 2021-07-28 ENCOUNTER — APPOINTMENT (OUTPATIENT)
Dept: CARDIOLOGY | Facility: CLINIC | Age: 41
End: 2021-07-28
Payer: COMMERCIAL

## 2021-07-28 ENCOUNTER — NON-APPOINTMENT (OUTPATIENT)
Age: 41
End: 2021-07-28

## 2021-07-28 VITALS
OXYGEN SATURATION: 98 % | SYSTOLIC BLOOD PRESSURE: 120 MMHG | RESPIRATION RATE: 15 BRPM | BODY MASS INDEX: 37.56 KG/M2 | WEIGHT: 220 LBS | DIASTOLIC BLOOD PRESSURE: 60 MMHG | HEART RATE: 100 BPM | TEMPERATURE: 97.2 F | HEIGHT: 64 IN

## 2021-07-28 VITALS
HEIGHT: 64 IN | OXYGEN SATURATION: 97 % | RESPIRATION RATE: 16 BRPM | SYSTOLIC BLOOD PRESSURE: 122 MMHG | DIASTOLIC BLOOD PRESSURE: 80 MMHG | WEIGHT: 222 LBS | TEMPERATURE: 97.2 F | BODY MASS INDEX: 37.9 KG/M2 | HEART RATE: 116 BPM

## 2021-07-28 DIAGNOSIS — R09.82 POSTNASAL DRIP: ICD-10-CM

## 2021-07-28 PROCEDURE — 93000 ELECTROCARDIOGRAM COMPLETE: CPT

## 2021-07-28 PROCEDURE — ZZZZZ: CPT

## 2021-07-28 PROCEDURE — 99072 ADDL SUPL MATRL&STAF TM PHE: CPT

## 2021-07-28 PROCEDURE — 95012 NITRIC OXIDE EXP GAS DETER: CPT

## 2021-07-28 PROCEDURE — 94010 BREATHING CAPACITY TEST: CPT

## 2021-07-28 PROCEDURE — 94729 DIFFUSING CAPACITY: CPT

## 2021-07-28 PROCEDURE — 99214 OFFICE O/P EST MOD 30 MIN: CPT | Mod: 25

## 2021-07-28 PROCEDURE — 99214 OFFICE O/P EST MOD 30 MIN: CPT

## 2021-07-28 RX ORDER — IBUPROFEN 800 MG/1
800 TABLET, FILM COATED ORAL 3 TIMES DAILY
Qty: 90 | Refills: 1 | Status: COMPLETED | COMMUNITY
Start: 2021-07-12 | End: 2021-07-28

## 2021-07-28 RX ORDER — OXYCODONE 5 MG/1
5 TABLET ORAL
Qty: 10 | Refills: 0 | Status: COMPLETED | COMMUNITY
Start: 2021-07-12 | End: 2021-07-28

## 2021-07-28 RX ORDER — ACETAMINOPHEN 500 MG/1
500 TABLET, COATED ORAL
Qty: 180 | Refills: 0 | Status: COMPLETED | COMMUNITY
Start: 2021-07-12 | End: 2021-07-28

## 2021-07-28 NOTE — ADDENDUM
[FreeTextEntry1] : Documented by Jam Monique acting as a scribe for Dr. Herber Day on 07/28/2021.\par \par All medical record entries made by the Scribe were at my, Dr. Herber Day's, direction and personally dictated by me on 07/28/2021 . I have reviewed the chart and agree that the record accurately reflects my personal performance of the history, physical exam, assessment and plan. I have also personally directed, reviewed, and agree with the discharge instructions. \par

## 2021-07-28 NOTE — DISCUSSION/SUMMARY
[FreeTextEntry1] : Dr. Weinberg-(PRIOR VISIT and PMH WITH Dr. Weinberg): \par This is a 40-year-old female past medical history significant for insulin-dependent diabetes mellitus, hypertension, status post recent pneumonia ( 3 ) times  status post  section, status post nasal surgery, comes in for cardiac follow up evaluation.  \par \par The patient is maintaining a good diet, and trying to incorporate more exercise in her daily routine.\par She will have new blood work later today for lipid panel.  Her blood pressure is under good control.\par Blood work done 2020 demonstrated a cholesterol of 149, triglycerides of 63, HDL 61, and LDL direct of 73 mg/dL.\par \par Electrocardiogram done 2020 demonstrated normal sinus rhythm rate of 85 bpm is otherwise unremarkable.\par \par The patient had a Holter monitor done 2020 which demonstrated no significant arrhythmia or heart block.  Patient had 3 isolated atrial premature contractions and one premature ventricular contraction.\par \par Given the patient's diabetic state and increased LDL cholesterol, I have recommended she start on lipid-lowering therapy.  The patient is not breast-feeding and does not plan to get pregnant again.\par She clearly understands she not be on statin therapy (with Crestor), if she gets pregnant.  She understands she must contact me immediately if she is trying to get pregnant or gets pregnant accidentally and discontinue Crestor therapy.\par \par She has no history of rheumatic fever.  Her cardiac risk factors Include hypertension, insulin-dependent diabetes mellitus.\par \par The patient understands that aerobic exercises must be increased to 40 minutes 4 times per week. A detailed discussion of lifestyle modification was done today. The patient has a good understanding of the diagnosis, and treatment plan. Lifestyle modification was also outlined.

## 2021-07-28 NOTE — ASSESSMENT
[FreeTextEntry1] : This is a 41 year year old female here today for follow up cardiac evaluation. \par She has a past medical history significant for insulin-dependent diabetes mellitus, hypertension, status post recent pneumonia ( 3 ) times  status post  section, status post nasal surgery\par \par CHIEF COMPLAINT:\par Today she is feeling generally well and does not have any complaints at this time. She is following closely with her endocrinologist in regards to her DM. She does have occasional CERVANTES which she thinks may be related to her weight gain \par \par -She has no history of rheumatic fever.  Her cardiac risk factors Include hypertension, insulin-dependent diabetes mellitus.\par \par BLOOD PRESSURE:\par -BP is well controlled in today's visit and patient is on Amlodipine 10mg PO DAILY and on Olmesartan HCTZ 40/25mg PO DAILY.\par \par -I have discussed the importance of maintaining good BP control and reviewed the newest guidelines with the patient while re-enforcing dietary sodium restrictions to no more than 2-3 g daily, DASH diet, life style modifications as well as the goal of maintaining ideal body weight with the patient today. I have advised the patient to avoid the use of over-the-counter medications/ supplements especially NSAIDS.\par \par I have reviewed with Ms. JOHNSON that serious health consequences can occur when blood pressure is not well controlled and the need for strict compliance with medication and that optimal control can significantly reduce the risk of cardiovascular disease stroke, heart attack and other organ damage. They verbally expressed understanding of the fore mentioned serious health consequences to me today.\par \par BLOOD WORK:\par -New blood work was done 3/31/2021 demonstrated normal lipid profile with controlled A1C. Pt currently taking Rosuvastatin 10mg PO DAILY. \par \par CHOLESTEROL CONTROL:\par -Patient will continue the advised  TLC diet and to continue follow-up for treatment of hyperlipidemia and repeat blood testing with diet and exercise. I have discussed different exercises and the importance of maintenance of optimal body weight. The importance of staying within guidelines and recommendations was stressed to the patient today and they acknowledged that they understand this to me verbally.\par \par  -Ms. JOHNSON was educated and advised that failure to follow-up with my medical recommendations to lower cholesterol can result in severe health consequences therefore, they will continuing a low saturated and low fat diet and to avoid excessive carbohydrates to help reduce triglycerides and that lowering LDL levels is associated with a significant decrease in serious cardiac events including but not limited to heart attack stroke and overall death. We will continue lipid lowering agents as advised based on blood test results and the patient understands to call if they develop severe muscle discomfort or if they have a reddish tinted discoloration in their urine.\par \par DIABETIC CONTROL:\par I will advise the patient to keep try to stay on a low carbohydrate diet lose weight and exercise to reduce spikes in their blood sugar.  The importance of monitoring blood sugar regularly and HgBA1c level were reviewed. I have also discussed annual eye exams to prevent blindness,  monitoring urine microalbumin regularly to check for kidney damage and the importance of proper foot care and regularly checking feet to prevent sores and possibly loss of limbs was reviewed. \par \par TESTING/REPORTS:\par -EKG done 2021 which demonstrated regular sinus rhythm with nonspecific ST-T wave changes, BPM of 100 (sinus tachycardia is her baseline).\par \par -The patient had a Holter monitor done 2020 which demonstrated no significant arrhythmia or heart block.  Patient had 3 isolated atrial premature contractions and one premature ventricular contraction.\par \par PLAN:\par -She will continue with her usual medications and will contact the office if she is having any complaints between now and their next follow up appointment.\par -The patient will schedule an Exercise Stress Test rule out significant coronary artery disease and will schedule \par an Echo Doppler examination to evaluate murmur, left ventricular function, chamber size, and rule out hypertrophy.\par \par I have discussed the plan of care with Ms. DAVID JOHNSON  and she  will follow up in 3 months. She is compliant with all of her medications.\par \par The patient understands that aerobic exercises must be increased to minutes 4 times/week and a detailed discussion of lifestyle modification was done today. \par The patient has a good understanding of the diagnosis, treatment plan and lifestyle modification. \par She will contact me at the office for any questions with their care or any changes in their health status.\par \par The patient was seen together with supervision physician Dr. Anatoliy Weinberg and the plan of care was discussed with the patient and will be carried out as noted above.\par \par Jovanna MORRISON

## 2021-07-28 NOTE — PROCEDURE
[FreeTextEntry1] : FENO was 100; a normal value being less than 25\par Fractional exhaled nitric oxide (FENO) is regarded as a simple, noninvasive method for assessing eosinophilic airway inflammation. Produced by a variety of cells within the lung, nitric oxide (NO) concentrations are generally low in healthy individuals. However, high concentrations of NO appear to be involved in nonspecific host defense mechanisms and chronic inflammatory diseases such as asthma. The American Thoracic Society (ATS) therefore has recommended using FENO to aid in the diagnosis and monitoring of eosinophilic airway inflammation and asthma, and for identifying steroid responsive individuals whose chronic respiratory symptoms may be caused by airway inflammation. \par \par Full PFT revealed normal flows, with a FEV1 of 2.04  L, which is  78 % of predicted, normal lung volumes, and a mild reduced diffusion of 17.3  , which is  64% of predicted, with a normal flow volume loop

## 2021-07-28 NOTE — ASSESSMENT
[FreeTextEntry1] : Ms. Champagne is a 41 year old female with a history of DM, HLD, heart murmur, HTN, asthma, allergies, GERD, recent anemia, rheumatoid arthritis, presenting to the office for a follow up visit. - improved\par \par The patient's shortness of breath is multifactorial due to:\par -pulmonary disease \par      -asthma\par -poor breathing mechanics \par -overweight/out of shape\par -?cardiac disease \par \par problem 1: asthma \par -continue Symbicort (160) 2 inhalations BID\par -continue Incruse 1 inhalation daily\par -continue to use Singulair 10 mg before bed\par -continue Ventolin rescue inhaler 2 inhalations before exercise, Q6H \par -recommended to cover her nose and mouth in the cold air\par \par -Asthma is  believed to be caused by inherited (genetic) and environmental factor, but its exact cause is unknown. Asthma may be triggered by allergens, lung infections, or irritants in the air. Asthma triggers are different for each person\par -Inhaler technique reviewed as well as oral hygiene techniques reviewed with patient. Avoidance of cold air, extremes of temperature, rescue inhaler should be used before exercise. Order of medication reviewed with patient. Recommended use of a cool mist humidifier in the bedroom. \par \par problem 2: allergies and sinuses\par -continue Olopatadine 0.6% at 1 sniff/nostril BID \par -continue to use OTC Claritin QAM\par -continue to use nasal saline\par -continue to use Xyzal 5 mg before bed \par \par -Environmental measures for allergies were encouraged including mattress and pillow cover, air purifier, and environmental controls. \par \par Problem 2A: R/o immune deficiency\par -s/p blood work: strep pneumonia titers (low), IgG levels (-), quantitative immunoglobulins (-)\par -Patient received Pneumovax in 7/2020, Prevnar-13 12/2020\par -Due to the fact that this pt has had more infections than would be expected and immunological blood work is indicated this would include: IgG subclasses, quantitative immunoglobulins, Strep pneumoniae titers as well as Vitamin D levels. Based on this blood work we will be able to decide where the pt needs additional pneumococcal vaccine either Prevnar 13 or pneumovax. Immunology evaluation will also be potentially indicated.\par \par problem 3: GERD \par -continue Omeprazole 40 mg before breakfast\par \par -Rule of 2s: avoid eating too much, eating too late, eating too spicy, eating two hours before bed\par -Things to avoid including overeating, spicy foods, tight clothing, eating within three hours of bed, this list is not all inclusive. \par -For treatment of reflux, possible options discussed including diet control, H2 blockers, PPIs, as well as coating motility agents discussed as treatment options. Timing of meals and proximity of last meal to sleep were discussed. If symptoms persist, a formal gastrointestinal evaluation is needed. \par \par Problem 4: anemia\par -continue to follow up with hematology, endocrinology\par \par problem 5: overweight (Tomas / Tate)\par -Weight loss, exercise, and diet control were discussed and are highly encouraged. Treatment options were given such as, aqua therapy, and contacting a nutritionist. Recommended to use the elliptical, stationary bike, less use of treadmill.  Obesity is associated with worsening asthma, shortness of breath, and potential for cardiac disease, diabetes, and other underlying medical conditions. \par \par problem 6: mild sleep apnea\par -continue to use Oxy-Aid and Breathe Rite strips, Provent and Theravent, Somnifix\par -recommended to use an oral appliance and she was given the names of Dr. Crescencio Cortez \par -based on her fatigue, EDS, snoring, and elevated mallampati class\par -Discussed the risks/associations with coronary artery disease, atrial fibrillation, arrhythmia, memory loss, issues with concentration, hypertension, nocturia, chronic reflux/Patrick’s esophagus some but not all inclusive. Treatment options discussed including CPAP/BiPAP machine, oral appliance, ProVent therapy, Oxy-Aid by [employer], new technologies, or positional sleep.\par \par problem 7: hand pain (quiet)\par -s/p Carpal tunnel (#2 pending) \par -recommended to have an evaluation with Shaquille Aguilar for hand splints or to have an evaluation for a orthotist \par -she can also try OTC wrist splints \par \par Problem 8: Cardiac\par -Continue to follow up with Dr. Weinberg.\par \par Problem 9: health maintenance\par -s/p Pfizer COVID 19 vaccine x 2\par -s/p influenza vaccine 2020\par -recommended strep pneumonia vaccines after age 65: Prevnar-13 vaccine, followed by Pneumo vaccine 23 one year following\par -recommended early intervention for URIs\par -recommended regular osteoporosis evaluations\par -recommended early dermatological evaluations\par -recommended after the age of 50 to the age of 70, colonoscopy every 5 years \par \par F/U in 4 months\par She is encouraged to call with any changes, concerns or questions. \par

## 2021-07-28 NOTE — HISTORY OF PRESENT ILLNESS
[FreeTextEntry1] : Ms. Champagne is a 40 year old female presenting to the office today for a follow up visit for allergies, asthma, GERD, and poor sleep. Her chief complaint is weight\par \par -she notes generally feeling well\par -she notes energy levels stable\par -she notes mild weight loss of 5-10 lbs\par -she notes heartburn stable controlled by Rx\par -she notes appetite and diet more controlled\par -she denies ankle swelling\par -she notes hand neuralgias quiet\par -she denies visual issues\par -she notes sleep quality stable\par -she notes getting enough sleep\par \par -denies any chest pain, chest pressure, diarrhea, constipation, dysphagia, sour taste in the mouth, dizziness, leg swelling, leg pain, myalgias, arthralgias, itchy eyes, itchy ears. \par

## 2021-07-28 NOTE — REASON FOR VISIT
[CV Risk Factors and Non-Cardiac Disease] : CV risk factors and non-cardiac disease [Hyperlipidemia] : hyperlipidemia [Follow-Up - Clinic] : a clinic follow-up of [Dyspnea] : dyspnea [Hypertension] : hypertension [Palpitations] : palpitations [Arrhythmia/ECG Abnorrmalities] : arrhythmia/ECG abnormalities [FreeTextEntry1] : sinus tachycardia

## 2021-07-28 NOTE — PHYSICAL EXAM
[Well Developed] : well developed [Well Nourished] : well nourished [No Acute Distress] : no acute distress [Normal Venous Pressure] : normal venous pressure [No Carotid Bruit] : no carotid bruit [Normal S1, S2] : normal S1, S2 [No Rub] : no rub [Clear Lung Fields] : clear lung fields [Good Air Entry] : good air entry [No Respiratory Distress] : no respiratory distress  [Soft] : abdomen soft [Non Tender] : non-tender [No Masses/organomegaly] : no masses/organomegaly [Normal Bowel Sounds] : normal bowel sounds [Normal Gait] : normal gait [No Edema] : no edema [No Cyanosis] : no cyanosis [No Clubbing] : no clubbing [No Varicosities] : no varicosities [No Rash] : no rash [No Skin Lesions] : no skin lesions [Moves all extremities] : moves all extremities [No Focal Deficits] : no focal deficits [Normal Speech] : normal speech [Alert and Oriented] : alert and oriented [Normal memory] : normal memory [General Appearance - Well Developed] : well developed [Normal Appearance] : normal appearance [Well Groomed] : well groomed [General Appearance - Well Nourished] : well nourished [No Deformities] : no deformities [General Appearance - In No Acute Distress] : no acute distress [Normal Conjunctiva] : the conjunctiva exhibited no abnormalities [Normal Oral Mucosa] : normal oral mucosa [Normal Oropharynx] : normal oropharynx [JVD Elevated _____cm] : JVD elevated [unfilled] ~U cm above clavicle [Normal Jugular Venous V Waves Present] : normal jugular venous V waves present [Respiration, Rhythm And Depth] : normal respiratory rhythm and effort [Exaggerated Use Of Accessory Muscles For Inspiration] : no accessory muscle use [Auscultation Breath Sounds / Voice Sounds] : lungs were clear to auscultation bilaterally [Bowel Sounds] : normal bowel sounds [Abdomen Soft] : soft [Abnormal Walk] : normal gait [Gait - Sufficient For Exercise Testing] : the gait was sufficient for exercise testing [Nail Clubbing] : no clubbing of the fingernails [Cyanosis, Localized] : no localized cyanosis [Skin Color & Pigmentation] : normal skin color and pigmentation [Skin Turgor] : normal skin turgor [] : no rash [Oriented To Time, Place, And Person] : oriented to person, place, and time [Affect] : the affect was normal [Mood] : the mood was normal [No Anxiety] : not feeling anxious [Normal Rate] : normal [No Murmur] : no murmurs heard [No Abnormalities] : the abdominal aorta was not enlarged and no bruit was heard [5th Left ICS - MCL] : palpated at the 5th LICS in the midclavicular line [Normal] : normal [No Precordial Heave] : no precordial heave was noted [Tachycardia] : tachycardic [Rhythm Regular] : regular [Normal S1] : normal S1 [Normal S2] : normal S2 [No Gallop] : no gallop heard [I] : a grade 1 [No Pitting Edema] : no pitting edema present [2+] : left 2+ [FreeTextEntry1] : carotid murmur vs bruit [S3] : no S3 [S4] : no S4 [Right Carotid Bruit] : no bruit heard over the right carotid [Left Carotid Bruit] : no bruit heard over the left carotid

## 2021-07-28 NOTE — CARDIOLOGY SUMMARY
[___] : [unfilled] [de-identified] : 7/28/2021 [de-identified] : Holter: 2/12/2020 [de-identified] : 11/25/2019 [de-identified] : 11/25/2019

## 2021-08-02 ENCOUNTER — APPOINTMENT (OUTPATIENT)
Dept: ENDOCRINOLOGY | Facility: CLINIC | Age: 41
End: 2021-08-02

## 2021-08-07 ENCOUNTER — OUTPATIENT (OUTPATIENT)
Dept: OUTPATIENT SERVICES | Facility: HOSPITAL | Age: 41
LOS: 1 days | End: 2021-08-07
Payer: COMMERCIAL

## 2021-08-07 DIAGNOSIS — Z98.890 OTHER SPECIFIED POSTPROCEDURAL STATES: Chronic | ICD-10-CM

## 2021-08-07 DIAGNOSIS — Z98.89 OTHER SPECIFIED POSTPROCEDURAL STATES: Chronic | ICD-10-CM

## 2021-08-07 DIAGNOSIS — Z11.52 ENCOUNTER FOR SCREENING FOR COVID-19: ICD-10-CM

## 2021-08-07 LAB — SARS-COV-2 RNA SPEC QL NAA+PROBE: SIGNIFICANT CHANGE UP

## 2021-08-07 PROCEDURE — U0003: CPT

## 2021-08-07 PROCEDURE — C9803: CPT

## 2021-08-07 PROCEDURE — U0005: CPT

## 2021-08-09 ENCOUNTER — TRANSCRIPTION ENCOUNTER (OUTPATIENT)
Age: 41
End: 2021-08-09

## 2021-08-09 RX ORDER — SODIUM CHLORIDE 9 MG/ML
1000 INJECTION, SOLUTION INTRAVENOUS
Refills: 0 | Status: DISCONTINUED | OUTPATIENT
Start: 2021-08-10 | End: 2021-08-24

## 2021-08-09 NOTE — ASU DISCHARGE PLAN (ADULT/PEDIATRIC) - CARE PROVIDER_API CALL
Radha Reilly)  [address], [facility] [address]  [place] [address]  Phone: [phone]  Fax: [phone]  Follow Up Time:

## 2021-08-10 ENCOUNTER — APPOINTMENT (OUTPATIENT)
Dept: ORTHOPEDIC SURGERY | Facility: HOSPITAL | Age: 41
End: 2021-08-10

## 2021-08-10 ENCOUNTER — OUTPATIENT (OUTPATIENT)
Dept: OUTPATIENT SERVICES | Facility: HOSPITAL | Age: 41
LOS: 1 days | End: 2021-08-10
Payer: COMMERCIAL

## 2021-08-10 VITALS
HEIGHT: 62 IN | SYSTOLIC BLOOD PRESSURE: 100 MMHG | TEMPERATURE: 98 F | WEIGHT: 223.11 LBS | RESPIRATION RATE: 16 BRPM | DIASTOLIC BLOOD PRESSURE: 65 MMHG | OXYGEN SATURATION: 100 % | HEART RATE: 104 BPM

## 2021-08-10 VITALS — HEART RATE: 93 BPM | RESPIRATION RATE: 16 BRPM | TEMPERATURE: 97 F | OXYGEN SATURATION: 96 %

## 2021-08-10 DIAGNOSIS — G56.02 CARPAL TUNNEL SYNDROME, LEFT UPPER LIMB: ICD-10-CM

## 2021-08-10 DIAGNOSIS — Z98.890 OTHER SPECIFIED POSTPROCEDURAL STATES: Chronic | ICD-10-CM

## 2021-08-10 DIAGNOSIS — Z98.89 OTHER SPECIFIED POSTPROCEDURAL STATES: Chronic | ICD-10-CM

## 2021-08-10 LAB
GLUCOSE BLDC GLUCOMTR-MCNC: 144 MG/DL — HIGH (ref 70–99)
GLUCOSE BLDC GLUCOMTR-MCNC: 157 MG/DL — HIGH (ref 70–99)

## 2021-08-10 PROCEDURE — 82962 GLUCOSE BLOOD TEST: CPT

## 2021-08-10 PROCEDURE — 29848 WRIST ENDOSCOPY/SURGERY: CPT | Mod: 79,LT

## 2021-08-10 PROCEDURE — 29848 WRIST ENDOSCOPY/SURGERY: CPT | Mod: LT

## 2021-08-10 RX ORDER — MOMETASONE FUROATE AND FORMOTEROL FUMARATE DIHYDRATE 200; 5 UG/1; UG/1
2 AEROSOL RESPIRATORY (INHALATION)
Qty: 0 | Refills: 0 | DISCHARGE

## 2021-08-10 RX ORDER — FAMOTIDINE 10 MG/ML
1 INJECTION INTRAVENOUS
Qty: 0 | Refills: 0 | DISCHARGE

## 2021-08-10 RX ORDER — OMEPRAZOLE 10 MG/1
1 CAPSULE, DELAYED RELEASE ORAL
Qty: 0 | Refills: 0 | DISCHARGE

## 2021-08-10 RX ORDER — MONTELUKAST 4 MG/1
1 TABLET, CHEWABLE ORAL
Qty: 0 | Refills: 0 | DISCHARGE

## 2021-08-10 RX ORDER — UMECLIDINIUM 62.5 UG/1
1 AEROSOL, POWDER ORAL
Qty: 0 | Refills: 0 | DISCHARGE

## 2021-08-10 RX ORDER — ROSUVASTATIN CALCIUM 5 MG/1
1 TABLET ORAL
Qty: 0 | Refills: 0 | DISCHARGE

## 2021-08-10 RX ORDER — INSULIN ASPART 100 [IU]/ML
0 INJECTION, SOLUTION SUBCUTANEOUS
Qty: 0 | Refills: 0 | DISCHARGE

## 2021-08-10 RX ORDER — ALBUTEROL 90 UG/1
2 AEROSOL, METERED ORAL
Qty: 0 | Refills: 0 | DISCHARGE

## 2021-08-10 RX ORDER — AMLODIPINE BESYLATE 2.5 MG/1
1 TABLET ORAL
Qty: 0 | Refills: 0 | DISCHARGE

## 2021-08-10 RX ORDER — OLMESARTAN MEDOXOMIL-HYDROCHLOROTHIAZIDE 25; 40 MG/1; MG/1
1 TABLET, FILM COATED ORAL
Qty: 0 | Refills: 0 | DISCHARGE

## 2021-08-19 ENCOUNTER — APPOINTMENT (OUTPATIENT)
Dept: ORTHOPEDIC SURGERY | Facility: CLINIC | Age: 41
End: 2021-08-19
Payer: COMMERCIAL

## 2021-08-19 PROCEDURE — 99024 POSTOP FOLLOW-UP VISIT: CPT

## 2021-08-19 NOTE — HISTORY OF PRESENT ILLNESS
[Doing Well] : is doing well [Excellent Pain Control] : has excellent pain control [de-identified] : Date of Surgery:7/13/2021\par Procedure: R endo CTR, R SF CRPP. \par Date of Surgery:8/10/2021\par Procedure: L endo CTR [de-identified] : She is doing well overall. Postoperative pain is well controlled. She denies any fevers, chills, or night sweats.  [de-identified] : Left upper extremity\par incision c/d/i\par SILT fdws/vif/vsf/palm cut\par + TU/OK/23x\par full fist \par 5/5 thenar strength\par WWP [de-identified] : Return appointment will be scheduled for one week.

## 2021-08-25 ENCOUNTER — APPOINTMENT (OUTPATIENT)
Dept: ORTHOPEDIC SURGERY | Facility: CLINIC | Age: 41
End: 2021-08-25
Payer: COMMERCIAL

## 2021-08-25 DIAGNOSIS — G56.01 CARPAL TUNNEL SYNDROME, RIGHT UPPER LIMB: ICD-10-CM

## 2021-08-25 PROCEDURE — 99024 POSTOP FOLLOW-UP VISIT: CPT

## 2021-08-26 ENCOUNTER — RX RENEWAL (OUTPATIENT)
Age: 41
End: 2021-08-26

## 2021-09-13 ENCOUNTER — APPOINTMENT (OUTPATIENT)
Dept: ORTHOPEDIC SURGERY | Facility: CLINIC | Age: 41
End: 2021-09-13
Payer: COMMERCIAL

## 2021-09-13 DIAGNOSIS — M20.011 MALLET FINGER OF RIGHT FINGER(S): ICD-10-CM

## 2021-09-13 DIAGNOSIS — G56.03 CARPAL TUNNEL SYNDROM,BILATERAL UPPER LIMBS: ICD-10-CM

## 2021-09-13 PROCEDURE — 64450 NJX AA&/STRD OTHER PN/BRANCH: CPT | Mod: 58,F9

## 2021-09-13 PROCEDURE — 99024 POSTOP FOLLOW-UP VISIT: CPT

## 2021-09-13 PROCEDURE — 73140 X-RAY EXAM OF FINGER(S): CPT | Mod: F9

## 2021-09-27 ENCOUNTER — APPOINTMENT (OUTPATIENT)
Dept: ENDOCRINOLOGY | Facility: CLINIC | Age: 41
End: 2021-09-27

## 2021-10-12 ENCOUNTER — APPOINTMENT (OUTPATIENT)
Dept: INTERNAL MEDICINE | Facility: CLINIC | Age: 41
End: 2021-10-12
Payer: COMMERCIAL

## 2021-10-12 ENCOUNTER — NON-APPOINTMENT (OUTPATIENT)
Age: 41
End: 2021-10-12

## 2021-10-12 VITALS — WEIGHT: 224 LBS | BODY MASS INDEX: 38.24 KG/M2 | HEIGHT: 64 IN

## 2021-10-12 DIAGNOSIS — M79.89 OTHER SPECIFIED SOFT TISSUE DISORDERS: ICD-10-CM

## 2021-10-12 DIAGNOSIS — Z00.00 ENCOUNTER FOR GENERAL ADULT MEDICAL EXAMINATION W/OUT ABNORMAL FINDINGS: ICD-10-CM

## 2021-10-12 DIAGNOSIS — M06.9 RHEUMATOID ARTHRITIS, UNSPECIFIED: ICD-10-CM

## 2021-10-12 DIAGNOSIS — Z23 ENCOUNTER FOR IMMUNIZATION: ICD-10-CM

## 2021-10-12 DIAGNOSIS — Z92.29 PERSONAL HISTORY OF OTHER DRUG THERAPY: ICD-10-CM

## 2021-10-12 PROCEDURE — 36415 COLL VENOUS BLD VENIPUNCTURE: CPT

## 2021-10-12 PROCEDURE — 99396 PREV VISIT EST AGE 40-64: CPT | Mod: 25

## 2021-10-12 PROCEDURE — G0008: CPT

## 2021-10-12 PROCEDURE — 93000 ELECTROCARDIOGRAM COMPLETE: CPT

## 2021-10-12 PROCEDURE — 90686 IIV4 VACC NO PRSV 0.5 ML IM: CPT

## 2021-10-12 RX ORDER — OXYCODONE 5 MG/1
5 TABLET ORAL
Qty: 5 | Refills: 0 | Status: DISCONTINUED | COMMUNITY
Start: 2021-08-09 | End: 2021-10-12

## 2021-10-13 LAB
BASOPHILS # BLD AUTO: 0.02 K/UL
BASOPHILS NFR BLD AUTO: 0.2 %
COVID-19 SPIKE DOMAIN ANTIBODY INTERPRETATION: POSITIVE
CREAT SPEC-SCNC: 85 MG/DL
EOSINOPHIL # BLD AUTO: 0.18 K/UL
EOSINOPHIL NFR BLD AUTO: 1.8 %
ESTIMATED AVERAGE GLUCOSE: 131 MG/DL
HBA1C MFR BLD HPLC: 6.2 %
HCT VFR BLD CALC: 35.6 %
HGB BLD-MCNC: 11 G/DL
HIV1+2 AB SPEC QL IA.RAPID: NONREACTIVE
IMM GRANULOCYTES NFR BLD AUTO: 0.4 %
LYMPHOCYTES # BLD AUTO: 1.63 K/UL
LYMPHOCYTES NFR BLD AUTO: 16 %
MAN DIFF?: NORMAL
MCHC RBC-ENTMCNC: 28.7 PG
MCHC RBC-ENTMCNC: 30.9 GM/DL
MCV RBC AUTO: 93 FL
MICROALBUMIN 24H UR DL<=1MG/L-MCNC: <1.2 MG/DL
MICROALBUMIN/CREAT 24H UR-RTO: NORMAL MG/G
MONOCYTES # BLD AUTO: 0.76 K/UL
MONOCYTES NFR BLD AUTO: 7.5 %
NEUTROPHILS # BLD AUTO: 7.55 K/UL
NEUTROPHILS NFR BLD AUTO: 74.1 %
PLATELET # BLD AUTO: 230 K/UL
PSA SERPL-MCNC: <0.01 NG/ML
RBC # BLD: 3.83 M/UL
RBC # FLD: 13.2 %
SARS-COV-2 AB SERPL IA-ACNC: >250 U/ML
T PALLIDUM AB SER QL IA: NEGATIVE
TSH SERPL-ACNC: 2.21 UIU/ML
WBC # FLD AUTO: 10.18 K/UL

## 2021-10-22 ENCOUNTER — APPOINTMENT (OUTPATIENT)
Dept: ENDOCRINOLOGY | Facility: CLINIC | Age: 41
End: 2021-10-22

## 2021-10-25 ENCOUNTER — TRANSCRIPTION ENCOUNTER (OUTPATIENT)
Age: 41
End: 2021-10-25

## 2021-10-25 LAB
25(OH)D3 SERPL-MCNC: 23.5 NG/ML
ALBUMIN SERPL ELPH-MCNC: 4.3 G/DL
ALP BLD-CCNC: 58 U/L
ALT SERPL-CCNC: <5 U/L
ANION GAP SERPL CALC-SCNC: 14 MMOL/L
APPEARANCE: CLEAR
AST SERPL-CCNC: 14 U/L
BILIRUB SERPL-MCNC: 0.2 MG/DL
BILIRUBIN URINE: NEGATIVE
BLOOD URINE: NEGATIVE
BUN SERPL-MCNC: 15 MG/DL
C TRACH RRNA SPEC QL NAA+PROBE: NOT DETECTED
CALCIUM SERPL-MCNC: 9.5 MG/DL
CHLORIDE SERPL-SCNC: 103 MMOL/L
CHOLEST SERPL-MCNC: 136 MG/DL
CO2 SERPL-SCNC: 25 MMOL/L
COLOR: NORMAL
CREAT SERPL-MCNC: 1.03 MG/DL
FERRITIN SERPL-MCNC: 25 NG/ML
FOLATE SERPL-MCNC: 11.1 NG/ML
GLUCOSE QUALITATIVE U: NEGATIVE
GLUCOSE SERPL-MCNC: 105 MG/DL
HDLC SERPL-MCNC: 54 MG/DL
KETONES URINE: NEGATIVE
LDLC SERPL CALC-MCNC: 65 MG/DL
LEUKOCYTE ESTERASE URINE: NEGATIVE
N GONORRHOEA RRNA SPEC QL NAA+PROBE: NOT DETECTED
NITRITE URINE: NEGATIVE
NONHDLC SERPL-MCNC: 81 MG/DL
PH URINE: 6
POTASSIUM SERPL-SCNC: 4.2 MMOL/L
PROT SERPL-MCNC: 7.5 G/DL
PROTEIN URINE: NEGATIVE
SODIUM SERPL-SCNC: 142 MMOL/L
SOURCE AMPLIFICATION: NORMAL
SPECIFIC GRAVITY URINE: 1.01
TRIGL SERPL-MCNC: 84 MG/DL
UROBILINOGEN URINE: NORMAL
VIT B12 SERPL-MCNC: 444 PG/ML

## 2021-11-05 ENCOUNTER — RX RENEWAL (OUTPATIENT)
Age: 41
End: 2021-11-05

## 2021-11-17 PROBLEM — R22.31 MASS OF RIGHT AXILLA: Status: ACTIVE | Noted: 2021-11-17

## 2021-11-18 ENCOUNTER — NON-APPOINTMENT (OUTPATIENT)
Age: 41
End: 2021-11-18

## 2021-11-18 ENCOUNTER — APPOINTMENT (OUTPATIENT)
Dept: SURGICAL ONCOLOGY | Facility: CLINIC | Age: 41
End: 2021-11-18
Payer: COMMERCIAL

## 2021-11-18 VITALS
SYSTOLIC BLOOD PRESSURE: 154 MMHG | HEART RATE: 100 BPM | OXYGEN SATURATION: 98 % | HEIGHT: 64 IN | TEMPERATURE: 97.8 F | DIASTOLIC BLOOD PRESSURE: 93 MMHG | RESPIRATION RATE: 17 BRPM | WEIGHT: 225 LBS | BODY MASS INDEX: 38.41 KG/M2

## 2021-11-18 DIAGNOSIS — R22.31 LOCALIZED SWELLING, MASS AND LUMP, RIGHT UPPER LIMB: ICD-10-CM

## 2021-11-18 PROCEDURE — 99244 OFF/OP CNSLTJ NEW/EST MOD 40: CPT

## 2021-11-18 NOTE — HISTORY OF PRESENT ILLNESS
[de-identified] : 41-year-old lady referred by her internist Dr. Ingrid QUINN for the evaluation and management of an abnormality in her RIGHT AXILLA, since ~2021.\par \par The patient first noticed this area in the shower.\par It waxes and wanes in prominence.\par It is occasionally sensitive to the touch.\par It has not changed significantly since she first noticed it.\par \par She is not sure if there is a cyclical component to the changes in her BSE.\par \par No other specific constitutional signs or symptoms.\par \par No personal history of malignancy.\par \par She has never had a breast biopsy\par \par Not Ashkenazi.\par \par \par + FH:\par Maternal grandmother: Breast cancer.\par Paternal aunt: Breast cancer.\par A different paternal aunt had a malignancy which was an unknown primary\par \par \par Breast imaging:\par 2021:\par Bilateral mammogram and ultrasound at : [facility].\par No radiographic or sonographic abnormality identified in the right axilla.\par \par \par Menarche at 13.\par  2, para 2, first at 27.\par Her cycles are regular.\par She has a ParaGard IUD.\par \par \par Her breast cancer risk score is 22.0.\par \par \par PMD: Dr. Ingrid QUINN.\par \par No pacemaker or defibrillator.\par No anticoagulants.\par \par +DIABETES.\par She takes NovoLog daily.\par Endocrinology: Dr. Gricelda SZYMANSKI\par \par \par Cardiology: Dr. Anatoliy DUMONT.\par + Hypertension.\par Treated with amlodipine.\par Hypercholesterolemia treated with rosuvastatin.\par \par Pulmonary: Dr. Herber MURPHY.\par + COPD.\par She uses albuterol, Dulera, Ellipta Ventolin, Singulair, and azelastine.\par \par + Endoscopically documented GERD.\par 2020 EGD: Dr. Suresh DAO.\par She takes famotidine daily.\par \par Baseline colonoscopy will be between age 46 and 51\par \par \par GYN: Last Pap smear was in 2016.\par She does not recall any further details.\par

## 2021-11-18 NOTE — REVIEW OF SYSTEMS
[Negative] : Heme/Lymph [FreeTextEntry5] : Hypertension [FreeTextEntry6] : COPD [FreeTextEntry8] : Reflux [de-identified] : Axillary mass

## 2021-11-18 NOTE — REASON FOR VISIT
[Initial Consultation] : an initial consultation for [Other: _____] : [unfilled] [FreeTextEntry2] : Palpable abnormality in the right axilla

## 2021-11-18 NOTE — ASSESSMENT
[FreeTextEntry1] : Today she is asymptomatic with a normal self exam and physical exam.\par \par Her area of concern corresponds to the anatomic tail of the breast, and the intermittent nature of her signs and symptoms along with a cyclical mastalgia presentation.\par \par \par October 2021:\par Bilateral mammogram and sonogram at : [facility].\par Disc submitted for entry into our system.\par \par Her breast cancer risk score is 22.\par Prescription entered for a diagnostic breast MRI.\par \par If asymptomatic, with normal imaging, we should see her in 4 to 6 months, sooner if needed.\par \par Reviewed in detail, all questions answered.\par \par Referring physician contacted through secure email

## 2021-11-23 ENCOUNTER — NON-APPOINTMENT (OUTPATIENT)
Age: 41
End: 2021-11-23

## 2021-11-23 ENCOUNTER — APPOINTMENT (OUTPATIENT)
Dept: ULTRASOUND IMAGING | Facility: CLINIC | Age: 41
End: 2021-11-23

## 2021-12-06 ENCOUNTER — NON-APPOINTMENT (OUTPATIENT)
Age: 41
End: 2021-12-06

## 2021-12-06 ENCOUNTER — APPOINTMENT (OUTPATIENT)
Dept: PULMONOLOGY | Facility: CLINIC | Age: 41
End: 2021-12-06
Payer: COMMERCIAL

## 2021-12-06 VITALS
OXYGEN SATURATION: 97 % | WEIGHT: 224 LBS | HEART RATE: 108 BPM | TEMPERATURE: 97.8 F | SYSTOLIC BLOOD PRESSURE: 130 MMHG | BODY MASS INDEX: 38.24 KG/M2 | HEIGHT: 64 IN | RESPIRATION RATE: 17 BRPM | DIASTOLIC BLOOD PRESSURE: 70 MMHG

## 2021-12-06 PROCEDURE — 99214 OFFICE O/P EST MOD 30 MIN: CPT | Mod: 25

## 2021-12-06 PROCEDURE — 94010 BREATHING CAPACITY TEST: CPT

## 2021-12-06 PROCEDURE — 95012 NITRIC OXIDE EXP GAS DETER: CPT

## 2021-12-06 NOTE — PROCEDURE
[FreeTextEntry1] : PFT revealed mild restrictive mild obstructive dysfunction, with a FEV1 of 2.29 L,which is 76 % of predicted with a normal flow volume loop \par \par FENO was 89 ; a normal value being less than 25\par Fractional exhaled nitric oxide (FENO) is regarded as a simple, noninvasive method for assessing eosinophilic airway inflammation. Produced by a variety of cells within the lung, nitric oxide (NO) concentrations are generally low in healthy individuals. However, high concentrations of NO appear to be involved in nonspecific host defense mechanisms and chronic inflammatory diseases such as asthma. The American Thoracic Society (ATS) therefore has recommended using FENO to aid in the diagnosis and monitoring of eosinophilic airway inflammation and asthma, and for identifying steroid responsive individuals whose chronic respiratory symptoms may be caused by airway inflammation.

## 2021-12-06 NOTE — ASSESSMENT
[FreeTextEntry1] : Ms. Champagne is a 41 year old female with a history of DM, HLD, heart murmur, HTN, asthma, allergies, GERD, recent anemia, rheumatoid arthritis, presenting to the office for a follow up visit. - improved; vaccinated x 2 Covid 19\par \par The patient's shortness of breath is multifactorial due to:\par -pulmonary disease \par      -asthma\par -poor breathing mechanics \par -overweight/out of shape\par -?cardiac disease \par \par problem 1: asthma- contorlled \par -continue Symbicort (160) 2 inhalations BID\par -continue Incruse 1 inhalation daily\par -continue to use Singulair 10 mg before bed\par -continue Ventolin rescue inhaler 2 inhalations before exercise, Q6H \par -recommended to cover her nose and mouth in the cold air\par \par -Asthma is  believed to be caused by inherited (genetic) and environmental factor, but its exact cause is unknown. Asthma may be triggered by allergens, lung infections, or irritants in the air. Asthma triggers are different for each person\par -Inhaler technique reviewed as well as oral hygiene techniques reviewed with patient. Avoidance of cold air, extremes of temperature, rescue inhaler should be used before exercise. Order of medication reviewed with patient. Recommended use of a cool mist humidifier in the bedroom. \par \par problem 2: allergies and sinuses\par -continue Olopatadine 0.6% at 1 sniff/nostril BID \par -continue to use OTC Claritin QAM\par -continue to use nasal saline\par -continue to use Xyzal 5 mg before bed \par \par -Environmental measures for allergies were encouraged including mattress and pillow cover, air purifier, and environmental controls. \par \par Problem 2A: R/o immune deficiency\par -s/p blood work: strep pneumonia titers (low), IgG levels (-), quantitative immunoglobulins (-)\par -Patient received Pneumovax in 7/2020, Prevnar-13 12/2020\par -Due to the fact that this pt has had more infections than would be expected and immunological blood work is indicated this would include: IgG subclasses, quantitative immunoglobulins, Strep pneumoniae titers as well as Vitamin D levels. Based on this blood work we will be able to decide where the pt needs additional pneumococcal vaccine either Prevnar 13 or pneumovax. Immunology evaluation will also be potentially indicated.\par \par problem 3: GERD \par -continue Omeprazole 40 mg before breakfast\par \par -Rule of 2s: avoid eating too much, eating too late, eating too spicy, eating two hours before bed\par -Things to avoid including overeating, spicy foods, tight clothing, eating within three hours of bed, this list is not all inclusive. \par -For treatment of reflux, possible options discussed including diet control, H2 blockers, PPIs, as well as coating motility agents discussed as treatment options. Timing of meals and proximity of last meal to sleep were discussed. If symptoms persist, a formal gastrointestinal evaluation is needed. \par \par Problem 4: anemia\par -continue to follow up with hematology, endocrinology\par \par problem 5: overweight (Tomas / Tate)\par -recommended "MUNIQ" OTC supplement \par -Weight loss, exercise, and diet control were discussed and are highly encouraged. Treatment options were given such as, aqua therapy, and contacting a nutritionist. Recommended to use the elliptical, stationary bike, less use of treadmill.  Obesity is associated with worsening asthma, shortness of breath, and potential for cardiac disease, diabetes, and other underlying medical conditions. \par \par problem 6: mild sleep apnea\par -continue to use Oxy-Aid and Breathe Rite strips, Provent and Theravent, Somnifix\par -recommended to use an oral appliance and she was given the names of Dr. Crescencio Cortez \par -based on her fatigue, EDS, snoring, and elevated mallampati class\par -Discussed the risks/associations with coronary artery disease, atrial fibrillation, arrhythmia, memory loss, issues with concentration, hypertension, nocturia, chronic reflux/Patrick’s esophagus some but not all inclusive. Treatment options discussed including CPAP/BiPAP machine, oral appliance, ProVent therapy, Oxy-Aid by [employer], new technologies, or positional sleep.\par \par problem 7: hand pain (quiet)\par -s/p Carpal tunnel (resolved) \par -recommended to have an evaluation with Shaquille Aguilar for hand splints or to have an evaluation for a orthotist \par -she can also try OTC wrist splints \par \par Problem 8: Cardiac\par -Continue to follow up with Dr. Weinberg.\par \par Problem 9: health maintenance\par -s/p Pfizer COVID 19 vaccine x 2- booster pending \par -s/p influenza vaccine 2021\par -recommended strep pneumonia vaccines after age 65: Prevnar-13 vaccine, followed by Pneumo vaccine 23 one year following\par -recommended early intervention for URIs\par -recommended regular osteoporosis evaluations\par -recommended early dermatological evaluations\par -recommended after the age of 50 to the age of 70, colonoscopy every 5 years \par \par F/U in 4 months\par She is encouraged to call with any changes, concerns or questions. \par

## 2021-12-06 NOTE — ADDENDUM
[FreeTextEntry1] : Documented by Buster Blackburn acting as a scribe for Dr. Herber Day on 12/06/2021 \par \par All medical record entries made by the Scribe were at my, Dr. Herber Day's, direction and personally dictated by me on 12/06/2021 . I have reviewed the chart and agree that the record accurately reflects my personal performance of the history, physical exam, assessment and plan. I have also personally directed, reviewed, and agree with the discharge instructions

## 2021-12-06 NOTE — REASON FOR VISIT
Pt report called to floor rn, pt and family aware of admit to unit. [Follow-Up] : a follow-up visit [FreeTextEntry1] : allergies, asthma, GERD, and poor sleep

## 2021-12-06 NOTE — HISTORY OF PRESENT ILLNESS
[FreeTextEntry1] : Ms. Champagne is a 41 year old female presenting to the office today for a follow up visit for allergies, asthma, GERD, and poor sleep. Her chief complaint is weight\par \par -she notes still working from home \par -she notes kids are now going to school \par -she denies palpitations\par -she notes weight is stable \par -she notes walking for exercise \par -She notes that bowels are regular \par -she denies nocturia \par -she notes sinus are active even with use of nasal spray \par -she notes itchy eyes due to allergies \par -she notes blood sugar has been controlled and A1C down to 6.2 \par -she notes awaiting booster vivi\par -s/p carpel tunnel surgery that went well \par \par -denies any visual issues, headaches, nausea, vomiting, fever, chills, sweats, chest pain, chest pressure, diarrhea, constipation, dysphagia, dizziness, leg swelling, leg pain, itchy ears, heartburn, reflux, or sour taste in the mouth.

## 2021-12-20 ENCOUNTER — RX RENEWAL (OUTPATIENT)
Age: 41
End: 2021-12-20

## 2021-12-29 ENCOUNTER — RX RENEWAL (OUTPATIENT)
Age: 41
End: 2021-12-29

## 2022-01-03 ENCOUNTER — TRANSCRIPTION ENCOUNTER (OUTPATIENT)
Age: 42
End: 2022-01-03

## 2022-01-05 ENCOUNTER — APPOINTMENT (OUTPATIENT)
Dept: CARDIOLOGY | Facility: CLINIC | Age: 42
End: 2022-01-05

## 2022-02-24 NOTE — PHYSICAL EXAM
[Normal] : supple, no neck mass and thyroid not enlarged [Normal Neck Lymph Nodes] : normal neck lymph nodes  [Normal Supraclavicular Lymph Nodes] : normal supraclavicular lymph nodes [Normal Axillary Lymph Nodes] : normal axillary lymph nodes [Normal] : oriented to person, place and time, with appropriate affect [de-identified] : Groins not examined [de-identified] : Below Mustarde Flap Text: The defect edges were debeveled with a #15 scalpel blade.  Given the size, depth and location of the defect and the proximity to free margins a Mustarde flap was deemed most appropriate.  Using a sterile surgical marker, an appropriate flap was drawn incorporating the defect. The area thus outlined was incised with a #15 scalpel blade.  The skin margins were undermined to an appropriate distance in all directions utilizing iris scissors.

## 2022-02-25 ENCOUNTER — TRANSCRIPTION ENCOUNTER (OUTPATIENT)
Age: 42
End: 2022-02-25

## 2022-03-08 ENCOUNTER — APPOINTMENT (OUTPATIENT)
Dept: CARDIOLOGY | Facility: CLINIC | Age: 42
End: 2022-03-08

## 2022-04-18 ENCOUNTER — APPOINTMENT (OUTPATIENT)
Dept: PULMONOLOGY | Facility: CLINIC | Age: 42
End: 2022-04-18
Payer: COMMERCIAL

## 2022-04-18 VITALS
HEART RATE: 100 BPM | WEIGHT: 228 LBS | HEIGHT: 63 IN | BODY MASS INDEX: 40.4 KG/M2 | DIASTOLIC BLOOD PRESSURE: 70 MMHG | OXYGEN SATURATION: 97 % | RESPIRATION RATE: 17 BRPM | TEMPERATURE: 97.1 F | SYSTOLIC BLOOD PRESSURE: 110 MMHG

## 2022-04-18 PROCEDURE — 94729 DIFFUSING CAPACITY: CPT

## 2022-04-18 PROCEDURE — 94727 GAS DIL/WSHOT DETER LNG VOL: CPT

## 2022-04-18 PROCEDURE — 99214 OFFICE O/P EST MOD 30 MIN: CPT | Mod: 25

## 2022-04-18 PROCEDURE — 94010 BREATHING CAPACITY TEST: CPT

## 2022-04-18 PROCEDURE — 95012 NITRIC OXIDE EXP GAS DETER: CPT

## 2022-04-18 NOTE — HISTORY OF PRESENT ILLNESS
[FreeTextEntry1] : Ms. Champagne is a 41 year old female presenting to the office today for a follow up visit for allergies, asthma, GERD, and poor sleep. Her chief complaint is weight\par \par -She notes that bowels are regular \par -she notes sense of smell and taste is stable \par -she notes walking 2-3 times a week for 1 hr for exercise \par -she denies dysphonia\par -she notes persistent arthralgia\par -she notes coughing has improved\par -she denies wheezing  \par -she notes persistent lower back issues \par -she notes slight increase in weight but mostly stable \par -she notes sinuses are stable\par \par -denies any visual issues, headaches, nausea, vomiting, fever, chills, sweats, chest pain, chest pressure, diarrhea, constipation, dysphagia, dizziness, leg swelling, leg pain, itchy eyes, itchy ears, heartburn, reflux, or sour taste in the mouth.

## 2022-04-18 NOTE — ADDENDUM
[FreeTextEntry1] : Documented by Buster Blackburn acting as a scribe for Dr. Herber Day on 04/18/2022 \par \par All medical record entries made by the Scribe were at my, Dr. Herber Day's, direction and personally dictated by me on 04/18/2022 . I have reviewed the chart and agree that the record accurately reflects my personal performance of the history, physical exam, assessment and plan. I have also personally directed, reviewed, and agree with the discharge instructions

## 2022-04-18 NOTE — ASSESSMENT
[FreeTextEntry1] : Ms. Champagne is a 41 year old female with a history of DM, HLD, heart murmur, HTN, asthma, allergies, GERD, recent anemia, rheumatoid arthritis, presenting to the office for a follow up visit. - improved; vaccinated x 2 Covid 19- booster pending \par \par The patient's shortness of breath is multifactorial due to:\par -pulmonary disease \par      -asthma\par -poor breathing mechanics \par -overweight/out of shape\par -?cardiac disease \par \par problem 1: asthma- contorlled \par -continue Symbicort (160) 2 inhalations BID\par -continue Incruse 1 inhalation daily\par -continue to use Singulair 10 mg before bed\par -continue Ventolin rescue inhaler 2 inhalations before exercise, Q6H \par -recommended to cover her nose and mouth in the cold air\par \par -Asthma is  believed to be caused by inherited (genetic) and environmental factor, but its exact cause is unknown. Asthma may be triggered by allergens, lung infections, or irritants in the air. Asthma triggers are different for each person\par -Inhaler technique reviewed as well as oral hygiene techniques reviewed with patient. Avoidance of cold air, extremes of temperature, rescue inhaler should be used before exercise. Order of medication reviewed with patient. Recommended use of a cool mist humidifier in the bedroom. \par \par problem 2: allergies and sinuses-stable \par -continue Olopatadine 0.6% at 1 sniff/nostril BID \par -continue to use OTC Claritin QAM\par -continue to use nasal saline\par -continue to use Xyzal 5 mg before bed \par \par -Environmental measures for allergies were encouraged including mattress and pillow cover, air purifier, and environmental controls. \par \par Problem 2A: R/o immune deficiency\par -s/p blood work: strep pneumonia titers (low), IgG levels (-), quantitative immunoglobulins (-)\par -Patient received Pneumovax in 7/2020, Prevnar-13 12/2020\par -Due to the fact that this pt has had more infections than would be expected and immunological blood work is indicated this would include: IgG subclasses, quantitative immunoglobulins, Strep pneumoniae titers as well as Vitamin D levels. Based on this blood work we will be able to decide where the pt needs additional pneumococcal vaccine either Prevnar 13 or pneumovax. Immunology evaluation will also be potentially indicated.\par \par problem 3: GERD \par -continue Omeprazole 40 mg before breakfast\par \par -Rule of 2s: avoid eating too much, eating too late, eating too spicy, eating two hours before bed\par -Things to avoid including overeating, spicy foods, tight clothing, eating within three hours of bed, this list is not all inclusive. \par -For treatment of reflux, possible options discussed including diet control, H2 blockers, PPIs, as well as coating motility agents discussed as treatment options. Timing of meals and proximity of last meal to sleep were discussed. If symptoms persist, a formal gastrointestinal evaluation is needed. \par \par Problem 4: anemia\par -continue to follow up with hematology, endocrinology\par \par problem 5: overweight (Tomas / Tate)\par -recommended "MUNIQ" OTC supplement \par -Weight loss, exercise, and diet control were discussed and are highly encouraged. Treatment options were given such as, aqua therapy, and contacting a nutritionist. Recommended to use the elliptical, stationary bike, less use of treadmill.  Obesity is associated with worsening asthma, shortness of breath, and potential for cardiac disease, diabetes, and other underlying medical conditions. \par \par problem 6: mild sleep apnea\par -continue to use Oxy-Aid and Breathe Rite strips, Provent and Theravent, Somnifix\par -recommended to use an oral appliance and she was given the names of Dr. Crescencio Cortez \par -based on her fatigue, EDS, snoring, and elevated mallampati class\par -Discussed the risks/associations with coronary artery disease, atrial fibrillation, arrhythmia, memory loss, issues with concentration, hypertension, nocturia, chronic reflux/Patrick’s esophagus some but not all inclusive. Treatment options discussed including CPAP/BiPAP machine, oral appliance, ProVent therapy, Oxy-Aid by [employer], new technologies, or positional sleep.\par \par problem 7: hand pain (quiet)\par -s/p Carpal tunnel (resolved) \par -recommended to have an evaluation with Shaquille Aguilar for hand splints or to have an evaluation for a orthotist \par -she can also try OTC wrist splints \par \par Problem 8: Cardiac\par -Continue to follow up with Dr. Weinberg.\par \par Problem 9: health maintenance\par -s/p Pfizer COVID 19 vaccine x 2- booster pending \par -s/p influenza vaccine 2021\par -recommended strep pneumonia vaccines after age 65: Prevnar-13 vaccine, followed by Pneumo vaccine 23 one year following\par -recommended early intervention for URIs\par -recommended regular osteoporosis evaluations\par -recommended early dermatological evaluations\par -recommended after the age of 50 to the age of 70, colonoscopy every 5 years \par \par F/U in 4 months\par She is encouraged to call with any changes, concerns or questions. \par

## 2022-04-18 NOTE — PROCEDURE
[FreeTextEntry1] : FENO was 79 ; a normal value being less than 25\par Fractional exhaled nitric oxide (FENO) is regarded as a simple, noninvasive method for assessing eosinophilic airway inflammation. Produced by a variety of cells within the lung, nitric oxide (NO) concentrations are generally low in healthy individuals. However, high concentrations of NO appear to be involved in nonspecific host defense mechanisms and chronic inflammatory diseases such as asthma. The American Thoracic Society (ATS) therefore has recommended using FENO to aid in the diagnosis and monitoring of eosinophilic airway inflammation and asthma, and for identifying steroid responsive individuals whose chronic respiratory symptoms may be caused by airway inflammation. \par \par Full PFT revealed normal flows, with a FEV1 of 2.15L,which is 82% of predicted,  normal lung volumes, and normal diffusion of 21.2 , which is 79% of predicted with a normal flow volume loop

## 2022-05-09 ENCOUNTER — NON-APPOINTMENT (OUTPATIENT)
Age: 42
End: 2022-05-09

## 2022-05-10 ENCOUNTER — TRANSCRIPTION ENCOUNTER (OUTPATIENT)
Age: 42
End: 2022-05-10

## 2022-05-10 ENCOUNTER — EMERGENCY (EMERGENCY)
Facility: HOSPITAL | Age: 42
LOS: 1 days | Discharge: HOME | End: 2022-05-10
Attending: EMERGENCY MEDICINE
Payer: COMMERCIAL

## 2022-05-10 VITALS
HEART RATE: 136 BPM | HEIGHT: 62 IN | TEMPERATURE: 102 F | WEIGHT: 225.09 LBS | OXYGEN SATURATION: 98 % | DIASTOLIC BLOOD PRESSURE: 88 MMHG | SYSTOLIC BLOOD PRESSURE: 142 MMHG | RESPIRATION RATE: 20 BRPM

## 2022-05-10 DIAGNOSIS — Z98.89 OTHER SPECIFIED POSTPROCEDURAL STATES: Chronic | ICD-10-CM

## 2022-05-10 DIAGNOSIS — Z98.890 OTHER SPECIFIED POSTPROCEDURAL STATES: Chronic | ICD-10-CM

## 2022-05-10 LAB
ALBUMIN SERPL ELPH-MCNC: 4 G/DL — SIGNIFICANT CHANGE UP (ref 3.3–5)
ALP SERPL-CCNC: 60 U/L — SIGNIFICANT CHANGE UP (ref 40–120)
ALT FLD-CCNC: 7 U/L — LOW (ref 10–45)
ANION GAP SERPL CALC-SCNC: 12 MMOL/L — SIGNIFICANT CHANGE UP (ref 5–17)
AST SERPL-CCNC: 21 U/L — SIGNIFICANT CHANGE UP (ref 10–40)
BILIRUB SERPL-MCNC: 0.2 MG/DL — SIGNIFICANT CHANGE UP (ref 0.2–1.2)
BUN SERPL-MCNC: 20 MG/DL — SIGNIFICANT CHANGE UP (ref 7–23)
CALCIUM SERPL-MCNC: 9.1 MG/DL — SIGNIFICANT CHANGE UP (ref 8.4–10.5)
CHLORIDE SERPL-SCNC: 104 MMOL/L — SIGNIFICANT CHANGE UP (ref 96–108)
CO2 SERPL-SCNC: 23 MMOL/L — SIGNIFICANT CHANGE UP (ref 22–31)
CREAT SERPL-MCNC: 1.12 MG/DL — SIGNIFICANT CHANGE UP (ref 0.5–1.3)
EGFR: 64 ML/MIN/1.73M2 — SIGNIFICANT CHANGE UP
GLUCOSE SERPL-MCNC: 208 MG/DL — HIGH (ref 70–99)
HCG SERPL-ACNC: <2 MIU/ML — SIGNIFICANT CHANGE UP
HCT VFR BLD CALC: 34.4 % — LOW (ref 34.5–45)
HGB BLD-MCNC: 11 G/DL — LOW (ref 11.5–15.5)
MAGNESIUM SERPL-MCNC: 1.7 MG/DL — SIGNIFICANT CHANGE UP (ref 1.6–2.6)
MCHC RBC-ENTMCNC: 28.6 PG — SIGNIFICANT CHANGE UP (ref 27–34)
MCHC RBC-ENTMCNC: 32 GM/DL — SIGNIFICANT CHANGE UP (ref 32–36)
MCV RBC AUTO: 89.6 FL — SIGNIFICANT CHANGE UP (ref 80–100)
PHOSPHATE SERPL-MCNC: 3.6 MG/DL — SIGNIFICANT CHANGE UP (ref 2.5–4.5)
PLATELET # BLD AUTO: 135 K/UL — LOW (ref 150–400)
POTASSIUM SERPL-MCNC: 4.1 MMOL/L — SIGNIFICANT CHANGE UP (ref 3.5–5.3)
POTASSIUM SERPL-SCNC: 4.1 MMOL/L — SIGNIFICANT CHANGE UP (ref 3.5–5.3)
PROT SERPL-MCNC: 7.4 G/DL — SIGNIFICANT CHANGE UP (ref 6–8.3)
RBC # BLD: 3.84 M/UL — SIGNIFICANT CHANGE UP (ref 3.8–5.2)
RBC # FLD: 13.3 % — SIGNIFICANT CHANGE UP (ref 10.3–14.5)
SODIUM SERPL-SCNC: 139 MMOL/L — SIGNIFICANT CHANGE UP (ref 135–145)
WBC # BLD: 5.41 K/UL — SIGNIFICANT CHANGE UP (ref 3.8–10.5)
WBC # FLD AUTO: 5.41 K/UL — SIGNIFICANT CHANGE UP (ref 3.8–10.5)

## 2022-05-10 PROCEDURE — 99285 EMERGENCY DEPT VISIT HI MDM: CPT

## 2022-05-10 PROCEDURE — 71045 X-RAY EXAM CHEST 1 VIEW: CPT | Mod: 26

## 2022-05-10 RX ORDER — SODIUM CHLORIDE 9 MG/ML
500 INJECTION INTRAMUSCULAR; INTRAVENOUS; SUBCUTANEOUS ONCE
Refills: 0 | Status: COMPLETED | OUTPATIENT
Start: 2022-05-10 | End: 2022-05-10

## 2022-05-10 RX ORDER — ACETAMINOPHEN 500 MG
650 TABLET ORAL ONCE
Refills: 0 | Status: COMPLETED | OUTPATIENT
Start: 2022-05-10 | End: 2022-05-10

## 2022-05-10 RX ADMIN — Medication 650 MILLIGRAM(S): at 22:35

## 2022-05-10 RX ADMIN — SODIUM CHLORIDE 500 MILLILITER(S): 9 INJECTION INTRAMUSCULAR; INTRAVENOUS; SUBCUTANEOUS at 23:38

## 2022-05-10 NOTE — ED PROVIDER NOTE - PATIENT PORTAL LINK FT
You can access the FollowMyHealth Patient Portal offered by [facility] by registering at the following website: [email or web address]. By joining [facility]’s FollowMyHealth portal, you will also be able to view your health information using other applications (apps) compatible with our system.

## 2022-05-10 NOTE — ED ADULT NURSE NOTE - OBJECTIVE STATEMENT
pt is  42yo female presenting to the ED complaining of multiple symptoms. pt states she began sneezing last night and thought it was her allergies. pt states feeling throat pain, headache, body aches, weakness, and fatigue today. pt states testing positive for covid today. pt describes SOB on exertion, and nausea upon standing or sitting up. pt describes subjective fevers partially relieved by tylenol. pt describes having a decreased appetite. pt A&Ox3 gross neuro intact, no difficulty speaking in complete sentences, s1s2 heart sounds heard, pulses x 4, mejias x4, abdomen soft nontender nondistended, skin intact. pt denies chest pain v/d, abdominal pain, f/c, urinary symptoms, hematuria.

## 2022-05-10 NOTE — ED ADULT NURSE NOTE - NS PRO AD NO ADVANCE DIRECTIVE
Neurosurgery  Awake, alert, oriented. Left arm drift present     Plan for steroids over weekend.    Planning for treatment options No

## 2022-05-10 NOTE — ED PROVIDER NOTE - CLINICAL SUMMARY MEDICAL DECISION MAKING FREE TEXT BOX
42 yo female, with history of HTN, Type 1 DM, Asthma presents to ED for shortness of breath, found to be COVID positive today at urgent care. CXR, CBC, CMP, cultures ordered. Currently hemodynamically stable and saturating well on RA.

## 2022-05-10 NOTE — ED PROVIDER NOTE - PHYSICAL EXAMINATION
VITALS:   T(C): 38.9 (05-10-22 @ 21:24), Max: 38.9 (05-10-22 @ 21:24)  HR: 136 (05-10-22 @ 21:24) (136 - 136)  BP: 142/88 (05-10-22 @ 21:24) (142/88 - 142/88)  RR: 20 (05-10-22 @ 21:24) (20 - 20)  SpO2: 98% (05-10-22 @ 21:24) (98% - 98%)    PHYSICAL EXAM:     GENERAL: NAD, lying in bed comfortably  HEAD:  Atraumatic, Normocephalic  EYES: EOMI, PERRLA, conjunctiva and sclera clear  ENT: Moist mucous membranes  NECK: Supple, No JVD  CHEST/LUNG: Clear to auscultation bilaterally; No rales, rhonchi, wheezing, or rubs. Unlabored respirations  HEART: Regular rate and rhythm; No murmurs, rubs, or gallops  ABDOMEN: normal bowel sounds; Soft, nontender, nondistended  EXTREMITIES:  2+ Peripheral Pulses, brisk capillary refill. No clubbing, cyanosis, or edema  Neurological:  A&Ox3, no focal deficits   SKIN: No rashes or lesions  PSYCH: normal affect and mood

## 2022-05-10 NOTE — ED PROVIDER NOTE - OBJECTIVE STATEMENT
40 yo female, with history of HTN, Type 1 DM, Asthma presents to ED for shortness of breath, found to be COVID positive. Recevied Pfizer x 2. States that last night patient felt subjective fevers and chills with some shortness of breath. Took her rescue inhalers with moderate relief of symptoms.  has been recently sick with COVID-19. Went to urgent care center earlier today and was instructed to come to ED if symptoms continue to persist. No history of asthma exacerbations. Denies any headaches, nausea, vomiting, diarrhea, or constipation. No previous COVID-19 infection in the past. 40 yo female, with history of HTN, Type 1 DM, Asthma presents to ED for shortness of breath, found to be COVID positive. Received Pfizer x 2. States that last night patient felt subjective fevers and chills with some shortness of breath. Took her rescue inhalers with moderate relief of symptoms.  has been recently sick with COVID-19. Went to urgent care center earlier today and was instructed to come to ED if symptoms continue to persist. No history of asthma exacerbations. Denies any headaches, nausea, vomiting, diarrhea, or constipation. No previous COVID-19 infection in the past.

## 2022-05-10 NOTE — ED PROVIDER NOTE - NS ED ROS FT
REVIEW OF SYSTEMS:    Constitutional: +subjective fevers, chills, lethargy   	Eyes:  No eye pain, visual disturbances, or discharge  	ENMT:  No neck pain  	Cardiac:  No chest pain, palpitations, no leg swelling  	Respiratory:  No cough, +SOB   	GI:  No nausea, vomiting, diarrhea, abdominal pain.  	:  no dysuria, hematuria, or incontinence  	MS:  No back pain.  	Neuro:  No headache or lightheadedness, dizziness   	Skin:  No skin rash  Except as documented in the HPI,  all other systems are negative.

## 2022-05-10 NOTE — ED PROVIDER NOTE - NSFOLLOWUPINSTRUCTIONS_ED_ALL_ED_FT
You presented to the ED for worsening myalgias, weakness, fevers and shortness - you were found to have COVID-19 infection earlier today prior to arrival. You were hemodynamically stable and your oxygen levels were normal. It is important that you follow up with our [facility] [place] treatment center. It is important that you follow up with your providers/listed clinics as instructed upon discharge. Please return to the ED if you have any fevers, worsening chest pain, shortness of breath, significant changes in mental status, or worsening headaches. Please take all medications as directed.

## 2022-05-10 NOTE — ED ADULT NURSE NOTE - NSFALLRSKOUTCOME_ED_ALL_ED
Teresa is a 46 year old who is being evaluated via a billable video visit.      How would you like to obtain your AVS? Mail a copy  If the video visit is dropped, the invitation should be resent by: Text to cell phone: [phone]  Will anyone else be joining your video visit? No     Patient needs refills for Seroquel.       Vitals - Patient Reported  Weight (Patient Reported): 117.9 kg (260 lb)  Height (Patient Reported): 182.9 cm (6')  BMI (Based on Pt Reported Ht/Wt): 35.26  Pain Score: Worst Pain (10)  Pain Loc: Low Back      Smitha Lanre MCELROY      Video Start Time: 11:34 AM  Video-Visit Details    Type of service:  Video Visit    Video End Time:11:56 AM    Originating Location (pt. Location): Home    Distant Location (provider location):  [facility]     Platform used for Video Visit: [facility]      Oncology Visit:   Date on this visit: Zia 10, 2021    Diagnosis:  ER positive left breast cancer metastasized to bones.     Primary Physician: No Ref-Primary, Physician     History Of Present Illness:    Ms. Sanderson is a 46 year old female with a h/o tobacco abuse and DVTs with left breast cancer metastasized to bone. She presented to [place] ED with back pain on 12/5/2020. MRI of the L-spine showed an abnormal L4 lesion with associated right paraspinal mass, abnormalities in L5 and the left iliac bone were also seen. CT C/A/P showed a left breast mass, lytic lesions of T7, L4, and the pelvis, and a 3 cm lesion in the kidney (thought to be a cyst). Ultrasound of the bilateral lower extremities showed a non-occlusive thrombus in the left popliteal vein. Mammogram and ultrasound of the bilateral breasts on 12/17/2020 showed a spiculated mass measuring at least 7.8 cm at 12-1:00 left breast extending from the nipple to 9 cm from the nipple with associated nipple retraction. This mass was biopsied, and showed IDC with surrounding DCIS, grade 3, ER+ 90%, and NM+ 75%.  HER2 was equivocal in  approximately 35% of tumor cells by FISH and was negative by IHC.    Metastatic Breast Cancer Treatment:  12/23/2021 - 1/7/2021  Radiation (3000 cGy) to the lumbar spine.  1/29/2021 - present  Ibrance, zoladex, and anastrozole.  5/17/21- Radiofrequency ablation, keloplasty/segmental plasty of L4     Interval History: Teresa is still having pain. It is mostly in her back. It is better controlled on Ms Contin 30 mg BID. She still has some diffuse pain but this is better. Has continued hot flashes. She has been getting out and walking more around the stores. She has dyspnea if she walks for more than 10 minutes. No wheezing, fevers/chills,cough. No leg swelling. She has been consistently taking Xarelto. She has been smoking more with feeling stress about the cancer.     Past Medical/Surgical History:   Past Medical History:   Diagnosis Date     Anxiety      Breast CA (H) 12/2020     Depression      DVT (deep venous thrombosis) (H) 2014     Left breast mass     x approximately 4-5 months     Pulmonary emboli (H)      Pyelonephritis      Schizoaffective disorder (H)      Tobacco use      Past Surgical History:   Procedure Laterality Date     IR LUMBAR KYPHOPLASTY VERTEBRAE  5/17/2021     TUBAL LIGATION  1998      No Known Allergies    Current Outpatient Medications   Medication     anastrozole (ARIMIDEX) 1 MG tablet     doxepin (SINEQUAN) 10 MG/ML (HIGH CONC) solution     gabapentin (NEURONTIN) 300 MG capsule     methocarbamol (ROBAXIN) 500 MG tablet     morphine (MS CONTIN) 15 MG CR tablet     naloxone (NARCAN) 4 MG/0.1ML nasal spray     ondansetron (ZOFRAN-ODT) 4 MG ODT tab     oxyCODONE (ROXICODONE) 5 MG tablet     palbociclib (IBRANCE) 125 MG tablet     pantoprazole (PROTONIX) 40 MG EC tablet     polyethylene glycol (MIRALAX) 17 GM/Dose powder     prochlorperazine (COMPAZINE) 10 MG tablet     QUEtiapine (SEROQUEL) 100 MG tablet     rivaroxaban ANTICOAGULANT (XARELTO) 20 MG TABS tablet     SENNA-docusate sodium  (SENNA S) 8.6-50 MG tablet     sertraline (ZOLOFT) 50 MG tablet     No current facility-administered medications for this visit.    '    Physical Exam:   Video physical exam  General: Patient appears well in no acute distress.   Skin: No visualized rash or lesions on visualized skin  Eyes: EOMI, no erythema, sclera icterus or discharge noted  Resp: Appears to be breathing comfortably without accessory muscle usage, speaking in full sentences, no cough  MSK: Appears to have normal range of motion based on visualized movements  Neurologic: No apparent tremors, facial movements symmetric  Psych: affect and mood congruent, alert and oriented    The rest of a comprehensive physical examination is deferred due to PHE (public health emergency) video restrictions        Laboratory/Imaging Studies   PET/CT 6/9                                                                 IMPRESSION: In this patient with history of stage IV invasive breast  cancer, metastatic to bone and left paraspinal musculature:  1.  No hypermetabolic disease in the chest, abdomen, or pelvis.   2.  Mild FDG uptake in the region of the left breast is below  background, consistent with complete response.  3.  Scattered mixed lytic and sclerotic osseous foci, likely  representing healed metastatic disease.  4.  Left greater than right perihilar groundglass opacities, could  represent small airway versus small vessel inflammation, less likely  infection.  5.  Mild focal radiotracer uptake at the GE junction, likely  represents sequela of gastroesophageal reflux.  6.  Kyphoplasty changes to L4 with associated vascular intravasation  of radiodense material extending into the IVC.      ASSESSMENT/PLAN:   46 year old female with history of DVT with left breast invasive ductal carcinoma, ER/IA positive, HER2 negative metastasized to bones.    1.  Metastatic breast cancer:  On treatment with Ibrance, zoladex, and anastrozole for almost 6 months. She had initial  improvement in markers and then had slow rise through April with some decrease last month. Her CEA has now normalized. She had a PET done yesterday which shows a CR. Reviewed the excellent news with her. She is very pleased with this and is motivated to stop smoking, do PT, and start exercising to help with her general condition. Has zoladex and labs again next week and then follow-up with Dr. Plunkett next month.     2.  Bone metastases:  She is s/p XRT to the lumbar spine.  She reports ongoing pain and continues to take MS contin 30 mg PO BID (dose recently increased) and oxycodone prn.  On Zometa since 1/18/2021 and is receiving once every 3 months. Last given 4/20/21.     3.  Pain: Reviewed I expect with a CR, her bone met pain should continue to improve. I think some pain in part is coming from the aromatase inhibitor. We need to be very cautious about opioid use for this. I would hope over the next few months opioids can start to be tapered. To help with AI pain, I want her to start PT and start walking every day and stretching.     4.  DVT:  Recommend continuing Xarelto until contraindicated given metastatic disease.      5.  Hot flashes/anxiety:  Continue Zoloft 50 mg PO daily and Seroquel 100 mg PO at bedtime. She is also on gabapentin TID.     6. Hx kyphoplasty to L4 with vascular intravasation of cement to IVC: Dr. Wiggins called me about this yesterday. He reviewed this cement should not move. The main risk is clot formation and would recommend therapeutic anticoagulation, which she is already on. There may be an option of stenting over the area in the future. He has follow-up with her coming up on 6/21. I did review this finding with Teresa today.     40 minutes spent on the date of the encounter doing chart review, review of test results, interpretation of tests, patient visit, documentation and discussion with other provider(s)     Alee Yang PA-C      Universal Safety Interventions

## 2022-05-10 NOTE — ED ADULT NURSE REASSESSMENT NOTE - NS ED NURSE REASSESS COMMENT FT1
Report received from DARI Bauman. Pt a & o x 4, able to follow commands. Breathing spontaneous & nonlabored, sating 99% on RA. Oral temp 102, Tylenol administered by prior RN. Abdomen soft & nondistended. IV site patent, no signs of phlebitis, flushing without difficulty. Call bell within reach, bed in lowest position.

## 2022-05-10 NOTE — ED PROVIDER NOTE - ATTENDING CONTRIBUTION TO CARE
Pablo Saavedra MD, FACEP: In this physician's medical judgement based on clinical history and physical exam: patient with COVID-19 infection. Mild symptoms and given Tylenol and ivf in the emergency department. Will assist in offering out patient MAB for patient secondary to health history.  mild distress secondary to cough  tachycardic   warm to touch  no crackles to bilateral lungs  mild tachypnea  cooperative  no edema  The patient was serially evaluated throughout emergency department course by the team. There was no acute deterioration up to this time in the emergency department. The patient has demonstrated clinical improvement and/or stability, feels better at this time according to emergency department team. Agree with goals/plan of emergency department care as described in this physician's electronic medical record, including diagnostics, therapeutics and consultation recommendation as clinically warranted. Will discharge home with close outpatient follow up with primary care physician/provider and specialist if necessary. The patient and/or family was educated on concerning signs and features to return to the emergency department, in layman terms, including but not limited to: nausea, vomiting, fever, chills, the inability to eat, take medications, or drink, persistent/worsening symptoms or any concerns at all. There are no acute or immediate life threatening issues present on history, clinical exam, or any diagnostic evaluation. The patient is a safe disposition home, has capacity and insight into their condition, is ambulatory in the Emergency Department with no further questions and will follow up with their doctor(s) this week. Diagnosis, prognosis, natural history and treatment was discussed with patient and/or family. The patient and/or family were given the opportunity to ask questions and have them answered in full. The patient and/or family are with capacity and insight into the situation, treatment, risks, benefits, alternative therapies, and understand that they can ask any further questions if needed. Patient and/or family/guardian understands anticipatory guidance and was given strict return and follow up precautions. The patient and/or family/guardian has been informed of the necessity to follow up with the PMD/Clinic/follow up as provided within 2-3 days, and the patient and/or family/guardian reports understanding of above with capacity and insight. The patient and/or family/guardian were informed of any results of their tests and are were encouraged to follow up on the findings with their doctor as well as the need to inform their doctor of any results. The patient and/or family/guardian are aware of the need to follow up with repeat testing as applicable and report understanding of the above with capacity and insight. The patient and/or family/guardian was made aware of any pending test results at the time of discharge and of the need to call back for the final results a well as the need to inform their doctor of the results.

## 2022-05-10 NOTE — ED PROVIDER NOTE - PROGRESS NOTE DETAILS
monoclonal antibody infusion appt made, patient aware and agreeable to therapy. remains stable without oxygen supplementation.

## 2022-05-11 VITALS
OXYGEN SATURATION: 97 % | DIASTOLIC BLOOD PRESSURE: 72 MMHG | SYSTOLIC BLOOD PRESSURE: 136 MMHG | HEART RATE: 104 BPM | RESPIRATION RATE: 20 BRPM | TEMPERATURE: 98 F

## 2022-05-11 LAB
APPEARANCE UR: CLEAR — SIGNIFICANT CHANGE UP
BACTERIA # UR AUTO: NEGATIVE — SIGNIFICANT CHANGE UP
BASOPHILS # BLD AUTO: 0 K/UL — SIGNIFICANT CHANGE UP (ref 0–0.2)
BASOPHILS NFR BLD AUTO: 0 % — SIGNIFICANT CHANGE UP (ref 0–2)
BILIRUB UR-MCNC: NEGATIVE — SIGNIFICANT CHANGE UP
COLOR SPEC: YELLOW — SIGNIFICANT CHANGE UP
CULTURE RESULTS: SIGNIFICANT CHANGE UP
DIFF PNL FLD: NEGATIVE — SIGNIFICANT CHANGE UP
EOSINOPHIL # BLD AUTO: 0.05 K/UL — SIGNIFICANT CHANGE UP (ref 0–0.5)
EOSINOPHIL NFR BLD AUTO: 0.9 % — SIGNIFICANT CHANGE UP (ref 0–6)
EPI CELLS # UR: 5 /HPF — SIGNIFICANT CHANGE UP
FLUAV AG NPH QL: SIGNIFICANT CHANGE UP
FLUBV AG NPH QL: SIGNIFICANT CHANGE UP
GLUCOSE UR QL: NEGATIVE — SIGNIFICANT CHANGE UP
HYALINE CASTS # UR AUTO: 8 /LPF — HIGH (ref 0–2)
KETONES UR-MCNC: NEGATIVE — SIGNIFICANT CHANGE UP
LEUKOCYTE ESTERASE UR-ACNC: NEGATIVE — SIGNIFICANT CHANGE UP
LYMPHOCYTES # BLD AUTO: 0.56 K/UL — LOW (ref 1–3.3)
LYMPHOCYTES # BLD AUTO: 10.4 % — LOW (ref 13–44)
MANUAL SMEAR VERIFICATION: SIGNIFICANT CHANGE UP
MONOCYTES # BLD AUTO: 0.47 K/UL — SIGNIFICANT CHANGE UP (ref 0–0.9)
MONOCYTES NFR BLD AUTO: 8.7 % — SIGNIFICANT CHANGE UP (ref 2–14)
NEUTROPHILS # BLD AUTO: 4.33 K/UL — SIGNIFICANT CHANGE UP (ref 1.8–7.4)
NEUTROPHILS NFR BLD AUTO: 80 % — HIGH (ref 43–77)
NITRITE UR-MCNC: NEGATIVE — SIGNIFICANT CHANGE UP
PH UR: 6 — SIGNIFICANT CHANGE UP (ref 5–8)
PLAT MORPH BLD: NORMAL — SIGNIFICANT CHANGE UP
PROT UR-MCNC: ABNORMAL
RBC BLD AUTO: SIGNIFICANT CHANGE UP
RBC CASTS # UR COMP ASSIST: 2 /HPF — SIGNIFICANT CHANGE UP (ref 0–4)
RSV RNA NPH QL NAA+NON-PROBE: SIGNIFICANT CHANGE UP
SARS-COV-2 RNA SPEC QL NAA+PROBE: DETECTED
SP GR SPEC: 1.03 — HIGH (ref 1.01–1.02)
SPECIMEN SOURCE: SIGNIFICANT CHANGE UP
UROBILINOGEN FLD QL: NEGATIVE — SIGNIFICANT CHANGE UP
WBC UR QL: 2 /HPF — SIGNIFICANT CHANGE UP (ref 0–5)

## 2022-05-11 PROCEDURE — 93005 ELECTROCARDIOGRAM TRACING: CPT

## 2022-05-11 PROCEDURE — 81001 URINALYSIS AUTO W/SCOPE: CPT

## 2022-05-11 PROCEDURE — 84100 ASSAY OF PHOSPHORUS: CPT

## 2022-05-11 PROCEDURE — 82803 BLOOD GASES ANY COMBINATION: CPT

## 2022-05-11 PROCEDURE — 87637 SARSCOV2&INF A&B&RSV AMP PRB: CPT

## 2022-05-11 PROCEDURE — 84702 CHORIONIC GONADOTROPIN TEST: CPT

## 2022-05-11 PROCEDURE — 84295 ASSAY OF SERUM SODIUM: CPT

## 2022-05-11 PROCEDURE — 84132 ASSAY OF SERUM POTASSIUM: CPT

## 2022-05-11 PROCEDURE — 82565 ASSAY OF CREATININE: CPT

## 2022-05-11 PROCEDURE — 85379 FIBRIN DEGRADATION QUANT: CPT

## 2022-05-11 PROCEDURE — 82435 ASSAY OF BLOOD CHLORIDE: CPT

## 2022-05-11 PROCEDURE — 80053 COMPREHEN METABOLIC PANEL: CPT

## 2022-05-11 PROCEDURE — 83735 ASSAY OF MAGNESIUM: CPT

## 2022-05-11 PROCEDURE — 83605 ASSAY OF LACTIC ACID: CPT

## 2022-05-11 PROCEDURE — 87040 BLOOD CULTURE FOR BACTERIA: CPT

## 2022-05-11 PROCEDURE — 85014 HEMATOCRIT: CPT

## 2022-05-11 PROCEDURE — 82330 ASSAY OF CALCIUM: CPT

## 2022-05-11 PROCEDURE — 85730 THROMBOPLASTIN TIME PARTIAL: CPT

## 2022-05-11 PROCEDURE — 85018 HEMOGLOBIN: CPT

## 2022-05-11 PROCEDURE — 85610 PROTHROMBIN TIME: CPT

## 2022-05-11 PROCEDURE — 71045 X-RAY EXAM CHEST 1 VIEW: CPT

## 2022-05-11 PROCEDURE — 85025 COMPLETE CBC W/AUTO DIFF WBC: CPT

## 2022-05-11 PROCEDURE — 36415 COLL VENOUS BLD VENIPUNCTURE: CPT

## 2022-05-11 PROCEDURE — 82947 ASSAY GLUCOSE BLOOD QUANT: CPT

## 2022-05-11 PROCEDURE — 87086 URINE CULTURE/COLONY COUNT: CPT

## 2022-05-11 PROCEDURE — 99285 EMERGENCY DEPT VISIT HI MDM: CPT | Mod: 25

## 2022-05-12 ENCOUNTER — APPOINTMENT (OUTPATIENT)
Dept: DISASTER EMERGENCY | Facility: HOSPITAL | Age: 42
End: 2022-05-12

## 2022-05-12 ENCOUNTER — OUTPATIENT (OUTPATIENT)
Dept: OUTPATIENT SERVICES | Facility: HOSPITAL | Age: 42
LOS: 1 days | End: 2022-05-12

## 2022-05-12 VITALS
TEMPERATURE: 99 F | DIASTOLIC BLOOD PRESSURE: 89 MMHG | RESPIRATION RATE: 20 BRPM | SYSTOLIC BLOOD PRESSURE: 137 MMHG | HEIGHT: 62 IN | WEIGHT: 225.09 LBS | OXYGEN SATURATION: 98 % | HEART RATE: 113 BPM

## 2022-05-12 VITALS
DIASTOLIC BLOOD PRESSURE: 69 MMHG | OXYGEN SATURATION: 100 % | RESPIRATION RATE: 18 BRPM | SYSTOLIC BLOOD PRESSURE: 102 MMHG | HEART RATE: 84 BPM | TEMPERATURE: 100 F

## 2022-05-12 DIAGNOSIS — Z98.890 OTHER SPECIFIED POSTPROCEDURAL STATES: Chronic | ICD-10-CM

## 2022-05-12 DIAGNOSIS — U07.1 COVID-19: ICD-10-CM

## 2022-05-12 DIAGNOSIS — Z98.89 OTHER SPECIFIED POSTPROCEDURAL STATES: Chronic | ICD-10-CM

## 2022-05-12 RX ORDER — BEBTELOVIMAB 87.5 MG/ML
175 INJECTION, SOLUTION INTRAVENOUS ONCE
Refills: 0 | Status: COMPLETED | OUTPATIENT
Start: 2022-05-12 | End: 2022-05-12

## 2022-05-12 RX ADMIN — BEBTELOVIMAB 175 MILLIGRAM(S): 87.5 INJECTION, SOLUTION INTRAVENOUS at 15:33

## 2022-05-12 NOTE — CHART NOTE - NSCHARTNOTEFT_GEN_A_CORE
CC: Monoclonal Antibody Infusion/COVID 19 Positive  41y Female with PMH of HTN, HLD, IDDM on insulin pump, obese,  and recent dx of COVID 19+ who presents today for elective Bebtelovimab. Patient has been screened and was deemed to be a candidate.    Symptoms/ Criteria: Fever, chills, fatigue, cough, Malaise, GERD, Headache.  Date of Symptom Onset:   Symptoms:  Fever, chills, fatigue, cough, Malaise, GERD, Headache.  Date of Positive COVID PCR: 5/10  Risk Profile includes:   obesity    Vaccination Status: vaccinated x 2 Pfizer    PMHx:  Hypertension    Family history of congestive heart failure    Hypercholesterolemia    Family history of arthritis    Family history of brain aneurysm    Infection due to severe acute respiratory syndrome coronavirus 2 (SARS-CoV-2)    Deviated nasal septum    Nasal disorder    Sinus disorder    Type 1 diabetes mellitus    Hypertension    Asthma    Arthritis    Migraine    Herniated lumbar intervertebral disc    Cervical disc disease    Chronic sinusitis    COVID-19 vaccine series completed    H/O  section    H/O sinus surgery    S/P carpal tunnel release        Exam/findings:  T(C): 37.2 (22 @ 14:59), Max: 37.2 (22 @ 14:59)  HR: 113 (22 @ 14:59) (113 - 113)  BP: 137/89 (22 @ 14:59) (137/89 - 137/89)  RR: 20 (22 @ 14:59) (20 - 20)  SpO2: 98% (22 @ 14:59) (98% - 98%)    PE:   Appearance: NAD	  HEENT:  NC/AT  Cardiovascular:  No edema  Respiratory: no use of accessory muscles  Gastrointestinal:  non-distended   Skin: warm and dry  Neurologic: Non-focal  Extremities: Normal range of motion    ASSESSMENT:  Pt is COVID positive with mild to moderate symptoms who was referred for elective MAB (Bebtelovimab). referred by [facility]    PLAN:  - MAB treatment explained to patient. I have reviewed the Bebtelovimab Emergency Use Authorization (EUA) and I have provided the patient or patient's caregiver with the following information:   1. FDA has authorized emergency use of Bebtelovimab to be administered for the treatment of mild to moderate COVID-19, which is not an FDA-approved biological product.   2. The patient or patient's caregiver has the option to accept or refuse administration of MAB.   3. The significant known and potential risks and benefits of Bebtelovimab and the extent to which such risks and benefits are unknown.  4. Information on available alternative treatments and risks and benefits of those alternatives.  - Patient verbalized understanding of plan and agrees to treatment. All questions answered.  - Consent for MAB obtained.   - 175mg of Bebtelovimab administered as a single intravenous injection over at least 30 seconds.   - Observe patient for one hour post medication administration and then if stable, discharge home with outpatient follow up as planned by PCP.      POST ASSESSMENT:   Patient completed MAB, and monitored x 1 hour post-infusion with no adverse reactions noted, remained hemodynamically stable.  - Patient tolerated infusion well; denies complaints of chest pain/SOB/dizziness/palpitations.   - VSS for discharge home.  - D/C instructions given/ fact sheet included.  - Patient was instructed to self-isolate and use infection control measures (e.g wear mask, isolate, social distance, avoid sharing personal items, clean and disinfect "high touch" surfaces, and frequent handwashing according to the CDC guidelines.   - The patient was informed on what symptoms to be aware of for the next couple of days, and if there are any issues to call the  clinical call center. Patient was instructed to follow up with primary care provider as needed.    DISCHARGE at __________

## 2022-05-13 ENCOUNTER — TRANSCRIPTION ENCOUNTER (OUTPATIENT)
Age: 42
End: 2022-05-13

## 2022-05-16 LAB
CULTURE RESULTS: SIGNIFICANT CHANGE UP
CULTURE RESULTS: SIGNIFICANT CHANGE UP
SPECIMEN SOURCE: SIGNIFICANT CHANGE UP
SPECIMEN SOURCE: SIGNIFICANT CHANGE UP

## 2022-05-23 ENCOUNTER — APPOINTMENT (OUTPATIENT)
Dept: SURGICAL ONCOLOGY | Facility: CLINIC | Age: 42
End: 2022-05-23

## 2022-05-24 ENCOUNTER — APPOINTMENT (OUTPATIENT)
Dept: CARDIOLOGY | Facility: CLINIC | Age: 42
End: 2022-05-24
Payer: COMMERCIAL

## 2022-05-24 VITALS
BODY MASS INDEX: 39.87 KG/M2 | TEMPERATURE: 98 F | DIASTOLIC BLOOD PRESSURE: 79 MMHG | HEIGHT: 63 IN | HEART RATE: 82 BPM | OXYGEN SATURATION: 98 % | WEIGHT: 225 LBS | SYSTOLIC BLOOD PRESSURE: 121 MMHG | RESPIRATION RATE: 15 BRPM

## 2022-05-24 PROCEDURE — 99213 OFFICE O/P EST LOW 20 MIN: CPT | Mod: 25

## 2022-05-24 PROCEDURE — 93015 CV STRESS TEST SUPVJ I&R: CPT

## 2022-05-24 PROCEDURE — 93306 TTE W/DOPPLER COMPLETE: CPT

## 2022-05-24 NOTE — REASON FOR VISIT
[Follow-Up - Clinic] : a clinic follow-up of [Dyspnea] : dyspnea [Palpitations] : palpitations [CV Risk Factors and Non-Cardiac Disease] : CV risk factors and non-cardiac disease [Hyperlipidemia] : hyperlipidemia [Arrhythmia/ECG Abnorrmalities] : arrhythmia/ECG abnormalities [Hypertension] : hypertension [FreeTextEntry1] : sinus tachycardia

## 2022-05-24 NOTE — DISCUSSION/SUMMARY
[FreeTextEntry1] : This is a 41-year-old female past medical history significant for insulin-dependent diabetes mellitus, status post COVID-19 infection in May 2022 (treated with monoclonal antibody infusion), hypertension, status post recent pneumonia ( 3 ) times  status post  section, status post nasal surgery, comes in for cardiac follow up evaluation.  \par She has no history of rheumatic fever.  Her cardiac risk factors Include hypertension, insulin-dependent diabetes mellitus.\par \par The patient had a normal exercise stress test May 24, 2022.\par The patient is maintaining a good diet, and trying to incorporate more exercise in her daily routine.\par She will have new blood work later today for lipid panel.  Her blood pressure is under good control.\par Blood work done 2020 demonstrated a cholesterol of 149, triglycerides of 63, HDL 61, and LDL direct of 73 mg/dL.\par Electrocardiogram done 2020 demonstrated normal sinus rhythm rate of 85 bpm is otherwise unremarkable.\par The patient had a Holter monitor done 2020 which demonstrated no significant arrhythmia or heart block.  Patient had 3 isolated atrial premature contractions and one premature ventricular contraction.\par The patient will have an echo Doppler examination later today to evaluate her left ventricular function, chamber size, murmur, and rule out hypertrophy.\par She will also have new blood work done with her primary care physician/or endocrinologist in the next few months.  I have asked to get me a copy of those results for my review.\par Blood work done 2021 demonstrated cholesterol 136, HDL 54, LDL calculated 65, non-HDL cholesterol 81 mg/dL.\par \par Given the patient's diabetic state and increased LDL cholesterol, I have recommended she start on lipid-lowering therapy.  The patient is not breast-feeding and does not plan to get pregnant again.\par She clearly understands she not be on statin therapy (with Crestor), if she gets pregnant.  She understands she must contact me immediately if she is trying to get pregnant or gets pregnant accidentally and discontinue Crestor therapy.\par \par \par The patient understands that aerobic exercises must be increased to 40 minutes 4 times per week. A detailed discussion of lifestyle modification was done today. The patient has a good understanding of the diagnosis, and treatment plan. Lifestyle modification was also outlined.

## 2022-05-24 NOTE — HISTORY OF PRESENT ILLNESS
[FreeTextEntry1] : This is a 40-year-old female past medical history significant for insulin-dependent diabetes mellitus, hypertension, status post  section, status post nasal surgery, comes in for cardiac follow up evaluation.  \par

## 2022-05-24 NOTE — ASSESSMENT
[FreeTextEntry1] : 41 year old F with PMHx of T1DM, HTN, HLD, asthma and mild KAL who presents for cardiac evaluation.\par \par Her current cardiovascular risk factors are controlled at time. She is asymptomatic today from cardiac standpoint. Most recent A1c 6.3% on insulin, BP is at goal today on amlodipine 10 mg and olmesartan 40-HCTZ 25 mg daily.  She is getting new blood work today. Her echocardiogram and exercise ECG were normal in 2019. Her direct LDL-C was 73 mg/dL on crestor 10 mg. She is attempting to lose weight at this time. She was referred to Zoya Mitchell for dietary guidance. She will return in three months.

## 2022-08-14 ENCOUNTER — RX RENEWAL (OUTPATIENT)
Age: 42
End: 2022-08-14

## 2022-08-15 ENCOUNTER — RX RENEWAL (OUTPATIENT)
Age: 42
End: 2022-08-15

## 2022-08-18 ENCOUNTER — APPOINTMENT (OUTPATIENT)
Dept: PULMONOLOGY | Facility: CLINIC | Age: 42
End: 2022-08-18

## 2022-08-18 ENCOUNTER — NON-APPOINTMENT (OUTPATIENT)
Age: 42
End: 2022-08-18

## 2022-08-18 VITALS
HEIGHT: 63 IN | OXYGEN SATURATION: 90 % | BODY MASS INDEX: 39.87 KG/M2 | HEART RATE: 96 BPM | WEIGHT: 225 LBS | SYSTOLIC BLOOD PRESSURE: 116 MMHG | RESPIRATION RATE: 16 BRPM | DIASTOLIC BLOOD PRESSURE: 64 MMHG | TEMPERATURE: 97.2 F

## 2022-08-18 PROCEDURE — 95012 NITRIC OXIDE EXP GAS DETER: CPT

## 2022-08-18 PROCEDURE — 94010 BREATHING CAPACITY TEST: CPT

## 2022-08-18 PROCEDURE — 99214 OFFICE O/P EST MOD 30 MIN: CPT | Mod: 25

## 2022-08-18 NOTE — HISTORY OF PRESENT ILLNESS
[FreeTextEntry1] : Ms. Champagne is a 42 year old female presenting to the office today for a follow up visit for allergies, asthma, GERD, and poor sleep. Her chief complaint is weight\par \par -she notes generally feeling good \par -she notes bowels are regular \par -she notes persistent arthralgia in leg and back \par -she notes persistent lumbago\par -she notes intermittent nausea and vomiting due to low blood sugar\par -she denies dysphonia \par -she denies getting enough sleep \par -she denies coughing \par -she denies wheezing\par -she notes allergies are quiet \par -she notes walking for exercise\par -s/p covid 19 vaccine x 2\par \par -She denies any visual issues, headaches, fever, chills, sweats, chest pains, chest pressure, diarrhea, constipation, dysphagia, dizziness, leg swelling, leg pain, itchy eyes, itchy ears, heartburn, reflux, or sour taste in the mouth.

## 2022-08-18 NOTE — PROCEDURE
[FreeTextEntry1] : PFT reveals mild restrictive dysfunction , with an FEV1 of 2.04L, which is 68% of predicted, with normal flow volume loop \par \par FENO was 35; normal value being less than 25\par Fractional exhaled nitric oxide (FENO) is regarded as a simple, noninvasive method for assessing eosinophilic airway inflammation. Produced by a variety of cells within the lung, nitric oxide (NO) concentrations are generally low in healthy individuals. However, high concentrations of NO appear to be involved in nonspecific host defense mechanisms and chronic inflammatory diseases such as asthma. The American Thoracic Society (ATS) therefore has recommended using FENO to aid in the diagnosis and monitoring of eosinophilic airway inflammation and asthma, and for identifying steroid responsive individuals whose chronic respiratory symptoms may be airway inflammation.

## 2022-08-18 NOTE — ADDENDUM
[FreeTextEntry1] : Documented by Buster Blackburn acting as a scribe for Dr. Herber Day on 08/18/2022 \par \par All medical record entries made by the Scribe were at my, Dr. Herber Day's, direction and personally dictated by me on 08/18/2022 . I have reviewed the chart and agree that the record accurately reflects my personal performance of the history, physical exam, assessment and plan. I have also personally directed, reviewed, and agree with the discharge instructions

## 2022-08-18 NOTE — ASSESSMENT
[FreeTextEntry1] : Ms. Champagne is a 42 year old female with a history of DM, HLD, heart murmur, HTN, asthma, allergies, GERD, recent anemia, rheumatoid arthritis, presenting to the office for a follow up visit. - improved; vaccinated x 2 Covid 19- #1 issues is back/ join pain \par \par The patient's shortness of breath is multifactorial due to:\par -pulmonary disease \par      -asthma\par -poor breathing mechanics \par -overweight/out of shape\par -?cardiac disease \par \par problem 1: asthma- contorlled \par -continue Symbicort (160) 2 inhalations BID\par -continue Incruse 1 inhalation daily\par -continue to use Singulair 10 mg before bed\par -continue Ventolin rescue inhaler 2 inhalations before exercise, Q6H \par -recommended to cover her nose and mouth in the cold air\par \par -Asthma is  believed to be caused by inherited (genetic) and environmental factor, but its exact cause is unknown. Asthma may be triggered by allergens, lung infections, or irritants in the air. Asthma triggers are different for each person\par -Inhaler technique reviewed as well as oral hygiene techniques reviewed with patient. Avoidance of cold air, extremes of temperature, rescue inhaler should be used before exercise. Order of medication reviewed with patient. Recommended use of a cool mist humidifier in the bedroom. \par \par problem 2: allergies and sinuses-stable \par -continue Olopatadine 0.6% at 1 sniff/nostril BID \par -continue to use OTC Claritin QAM\par -continue to use nasal saline\par -continue to use Xyzal 5 mg before bed \par -Environmental measures for allergies were encouraged including mattress and pillow cover, air purifier, and environmental controls. \par \par Problem 2A: R/o immune deficiency\par -s/p blood work: strep pneumonia titers (low), IgG levels (-), quantitative immunoglobulins (-)\par -Patient received Pneumovax in 7/2020, Prevnar-13 12/2020\par -Due to the fact that this pt has had more infections than would be expected and immunological blood work is indicated this would include: IgG subclasses, quantitative immunoglobulins, Strep pneumoniae titers as well as Vitamin D levels. Based on this blood work we will be able to decide where the pt needs additional pneumococcal vaccine either Prevnar 13 or pneumovax. Immunology evaluation will also be potentially indicated.\par \par problem 3: GERD \par -continue Omeprazole 40 mg before breakfast\par -Rule of 2s: avoid eating too much, eating too late, eating too spicy, eating two hours before bed\par -Things to avoid including overeating, spicy foods, tight clothing, eating within three hours of bed, this list is not all inclusive. \par -For treatment of reflux, possible options discussed including diet control, H2 blockers, PPIs, as well as coating motility agents discussed as treatment options. Timing of meals and proximity of last meal to sleep were discussed. If symptoms persist, a formal gastrointestinal evaluation is needed. \par \par Problem 4: anemia\par -continue to follow up with hematology, endocrinology\par \par problem 5: overweight (Tomas / Tate)\par - Recommended "10-Day Detox" by Joseluis Bianchi for 2-3 weeks followed by probiotics \par -recommended "MUNIQ" OTC supplement \par -Weight loss, exercise, and diet control were discussed and are highly encouraged. Treatment options were given such as, aqua therapy, and contacting a nutritionist. Recommended to use the elliptical, stationary bike, less use of treadmill.  Obesity is associated with worsening asthma, shortness of breath, and potential for cardiac disease, diabetes, and other underlying medical conditions. \par \par problem 6: mild sleep apnea\par -continue to use Oxy-Aid and Breathe Rite strips, Provent and Theravent, Somnifix\par -recommended to use an oral appliance and she was given the names of Dr. Crescencio Cortez \par -based on her fatigue, EDS, snoring, and elevated mallampati class\par -Discussed the risks/associations with coronary artery disease, atrial fibrillation, arrhythmia, memory loss, issues with concentration, hypertension, nocturia, chronic reflux/Patrick’s esophagus some but not all inclusive. Treatment options discussed including CPAP/BiPAP machine, oral appliance, ProVent therapy, Oxy-Aid by [employer], new technologies, or positional sleep.\par \par problem 7: hand pain (quiet)\par -recommended SPM and quercetin\par -s/p Carpal tunnel (resolved) \par -recommended to have an evaluation with Shaquille Aguilar for hand splints or to have an evaluation for a orthotist \par -she can also try OTC wrist splints \par \par Problem 8: Cardiac\par -Continue to follow up with Dr. Weinberg.\par \par Problem 9: health maintenance\par -recommended [facility] stretches \par -s/p Pfizer COVID 19 vaccine x 2\par -s/p influenza vaccine 2021\par -recommended strep pneumonia vaccines after age 65: Prevnar-13 vaccine, followed by Pneumo vaccine 23 one year following\par -recommended early intervention for URIs\par -recommended regular osteoporosis evaluations\par -recommended early dermatological evaluations\par -recommended after the age of 50 to the age of 70, colonoscopy every 5 years \par \par F/U in 4 months\par She is encouraged to call with any changes, concerns or questions. \par

## 2022-08-25 ENCOUNTER — APPOINTMENT (OUTPATIENT)
Dept: ENDOCRINOLOGY | Facility: CLINIC | Age: 42
End: 2022-08-25
Payer: COMMERCIAL

## 2022-08-25 VITALS
HEART RATE: 84 BPM | OXYGEN SATURATION: 97 % | WEIGHT: 225 LBS | DIASTOLIC BLOOD PRESSURE: 84 MMHG | SYSTOLIC BLOOD PRESSURE: 126 MMHG | HEIGHT: 63 IN | TEMPERATURE: 97.2 F | BODY MASS INDEX: 39.87 KG/M2

## 2022-08-25 DIAGNOSIS — O24.019 PRE-EXISTING TYPE 1 DIABETES MELLITUS, IN PREGNANCY, UNSPECIFIED TRIMESTER: ICD-10-CM

## 2022-08-25 LAB
GLUCOSE BLDC GLUCOMTR-MCNC: 108
HBA1C MFR BLD HPLC: 5.8

## 2022-08-25 PROCEDURE — 83036 HEMOGLOBIN GLYCOSYLATED A1C: CPT | Mod: QW

## 2022-08-25 PROCEDURE — 95251 CONT GLUC MNTR ANALYSIS I&R: CPT

## 2022-08-25 PROCEDURE — 82962 GLUCOSE BLOOD TEST: CPT

## 2022-08-25 PROCEDURE — 99215 OFFICE O/P EST HI 40 MIN: CPT | Mod: 25

## 2022-08-25 RX ORDER — GLUCAGON 3 MG/1
3 POWDER NASAL
Qty: 1 | Refills: 0 | Status: ACTIVE | COMMUNITY
Start: 2022-08-25 | End: 1900-01-01

## 2022-08-25 NOTE — HISTORY OF PRESENT ILLNESS
[Continuous Glucose Monitoring] : Continuous Glucose Monitoring: Yes [Dexcom] : Dexcom [FreeTextEntry1] : Patient is a 42 F with history of asthma, T1DM, GERD here for follow up. \par \par FH: aunt has T2D\par SH: denies smoking or alcohol use\par Menstrual history: regular, has IUD placed in , due to removal in  \par \par T1DM \par Diabetes history: at age 23-24. Went for her physical, had high glucose in in the urine, diagnosed with T2D initially. Got pregnant one year later and was switched from Metformin to Insulin. After pregnancy, she was told that she is insulin dependent. Have been on insulin since . \par \par Most recent A1C 5.8 2022\par \par Diabetes complications:\par Neuropathy: no\par Retinopathy: no retinopathy (last eye exam )\par Nephropathy: negative ACR in 10/2021 (most recent Cr 1.03, GFR 63)\par CAD/MI: none\par DKA: twice, most recent 2012, had flu was severely dehydrated\par \par Current DM medications: \par Started with Medtronic pump since . Started on Tandem with Control IQ 2016 use [employer] XC change it every 3 days. Also rotate in the abdomen.  \par \par Past DM medications: \par Used to be on metformin before pregnancy \par  \par Finger sticks: \par Sensor worn: dexcom G7 wear it on the abdomen \par Sharing code [id number]\par AVG B\par \par Time period reviewed: -\par TIR % 83\par High % 11\par Very high  % 2\par Low % 3\par Very low % 1\par SD 42\par GMI% 6.5\par Average glucose:133\par Sensor usage: 100\par Pattern: hypoglycemia at 6-8 pm usually on the days after she has been running around \par - had takeout so dinner sugar was high \par usually bolus at the same time when she starts eating \par \par Diet: \par Breakfast: egg white, sometimes skips\par Lunch:  sometimes skips lunch, chickens nuggets, salad\par Dinner: rice, chicken stew, pasta\par Snacks: doritos, potato chips, chips, pineapple \par Drinks: water, diet soda, water down juice\par Exercise: walk in the morning \par \par HLD\par On atorvastatin \par last LDL 65 10/12/2021\par \par TSH 2.21\par \par Vitamin D deficiency \par last vitamin D level 23.5 10/21/2021\par No longer on supplement. \par \par  visit: \par  had date snacks at bedtime, did not bolus enough.  Had hypoglycemia to 7-8 pm, was running around a lot yesterday. Had symptoms of dizziness, tired. Has glucose tablet and glucagon at home. Most of the time gives bolus and eats at the same time. \par  [FreeTextEntry2] : 83 [FreeTextEntry3] : 13 [FreeTextEntry4] : 4 [de-identified] : 6.5 [FreeTextEntry5] : 133

## 2022-08-25 NOTE — HISTORY OF PRESENT ILLNESS
[Continuous Glucose Monitoring] : Continuous Glucose Monitoring: Yes [Dexcom] : Dexcom [FreeTextEntry1] : Patient is a 42 F with history of asthma, T1DM, GERD here for follow up. \par \par FH: aunt has T2D\par SH: denies smoking or alcohol use\par Menstrual history: regular, has IUD placed in , due to removal in  \par \par T1DM \par Diabetes history: at age 23-24. Went for her physical, had high glucose in in the urine, diagnosed with T2D initially. Got pregnant one year later and was switched from Metformin to Insulin. After pregnancy, she was told that she is insulin dependent. Have been on insulin since . \par \par Most recent A1C 5.8 2022\par \par Diabetes complications:\par Neuropathy: no\par Retinopathy: no retinopathy (last eye exam )\par Nephropathy: negative ACR in 10/2021 (most recent Cr 1.03, GFR 63)\par CAD/MI: none\par DKA: twice, most recent 2012, had flu was severely dehydrated\par \par Current DM medications: \par Started with Medtronic pump since . Started on Tandem with Control IQ 2016 use [employer] XC change it every 3 days. Also rotate in the abdomen.  \par \par Past DM medications: \par Used to be on metformin before pregnancy \par  \par Finger sticks: \par Sensor worn: dexcom G7 wear it on the abdomen \par Sharing code [id number]\par AVG B\par \par Time period reviewed: -\par TIR % 83\par High % 11\par Very high  % 2\par Low % 3\par Very low % 1\par SD 42\par GMI% 6.5\par Average glucose:133\par Sensor usage: 100\par Pattern: hypoglycemia at 6-8 pm usually on the days after she has been running around \par - had takeout so dinner sugar was high \par usually bolus at the same time when she starts eating \par \par Diet: \par Breakfast: egg white, sometimes skips\par Lunch:  sometimes skips lunch, chickens nuggets, salad\par Dinner: rice, chicken stew, pasta\par Snacks: doritos, potato chips, chips, pineapple \par Drinks: water, diet soda, water down juice\par Exercise: walk in the morning \par \par HLD\par On atorvastatin \par last LDL 65 10/12/2021\par \par TSH 2.21\par \par Vitamin D deficiency \par last vitamin D level 23.5 10/21/2021\par No longer on supplement. \par \par  visit: \par  had date snacks at bedtime, did not bolus enough.  Had hypoglycemia to 7-8 pm, was running around a lot yesterday. Had symptoms of dizziness, tired. Has glucose tablet and glucagon at home. Most of the time gives bolus and eats at the same time. \par  [FreeTextEntry2] : 83 [FreeTextEntry3] : 13 [FreeTextEntry4] : 4 [de-identified] : 6.5 [FreeTextEntry5] : 133

## 2022-08-25 NOTE — HISTORY OF PRESENT ILLNESS
[Continuous Glucose Monitoring] : Continuous Glucose Monitoring: Yes [Dexcom] : Dexcom [FreeTextEntry1] : Patient is a 42 F with history of asthma, T1DM, GERD here for follow up. \par \par FH: aunt has T2D\par SH: denies smoking or alcohol use\par Menstrual history: regular, has IUD placed in , due to removal in  \par \par T1DM \par Diabetes history: at age 23-24. Went for her physical, had high glucose in in the urine, diagnosed with T2D initially. Got pregnant one year later and was switched from Metformin to Insulin. After pregnancy, she was told that she is insulin dependent. Have been on insulin since . \par \par Most recent A1C 5.8 2022\par \par Diabetes complications:\par Neuropathy: no\par Retinopathy: no retinopathy (last eye exam )\par Nephropathy: negative ACR in 10/2021 (most recent Cr 1.03, GFR 63)\par CAD/MI: none\par DKA: twice, most recent 2012, had flu was severely dehydrated\par \par Current DM medications: \par Started with Medtronic pump since . Started on Tandem with Control IQ 2016 use [employer] XC change it every 3 days. Also rotate in the abdomen.  \par \par Past DM medications: \par Used to be on metformin before pregnancy \par  \par Finger sticks: \par Sensor worn: dexcom G7 wear it on the abdomen \par Sharing code [id number]\par AVG B\par \par Time period reviewed: -\par TIR % 83\par High % 11\par Very high  % 2\par Low % 3\par Very low % 1\par SD 42\par GMI% 6.5\par Average glucose:133\par Sensor usage: 100\par Pattern: hypoglycemia at 6-8 pm usually on the days after she has been running around \par - had takeout so dinner sugar was high \par usually bolus at the same time when she starts eating \par \par Diet: \par Breakfast: egg white, sometimes skips\par Lunch:  sometimes skips lunch, chickens nuggets, salad\par Dinner: rice, chicken stew, pasta\par Snacks: doritos, potato chips, chips, pineapple \par Drinks: water, diet soda, water down juice\par Exercise: walk in the morning \par \par HLD\par On atorvastatin \par last LDL 65 10/12/2021\par \par TSH 2.21\par \par Vitamin D deficiency \par last vitamin D level 23.5 10/21/2021\par No longer on supplement. \par \par  visit: \par  had date snacks at bedtime, did not bolus enough.  Had hypoglycemia to 7-8 pm, was running around a lot yesterday. Had symptoms of dizziness, tired. Has glucose tablet and glucagon at home. Most of the time gives bolus and eats at the same time. \par  [FreeTextEntry2] : 83 [FreeTextEntry3] : 13 [FreeTextEntry4] : 4 [de-identified] : 6.5 [FreeTextEntry5] : 133

## 2022-08-25 NOTE — REVIEW OF SYSTEMS
[Fatigue] : no fatigue [Decreased Appetite] : appetite not decreased [Fever] : no fever [Chills] : no chills [Dry Eyes] : no dryness [Eye Pain] : no pain [Blurred Vision] : no blurred vision [Slow Heart Rate] : heart rate is not slow [Palpitations] : no palpitations [Lower Ext Edema] : no lower extremity edema [Shortness Of Breath] : no shortness of breath [Cough] : no cough [Nausea] : no nausea [Constipation] : no constipation [Vomiting] : no vomiting [Diarrhea] : no diarrhea [Polyuria] : no polyuria [Dysuria] : no dysuria [Joint Pain] : no joint pain [Muscle Weakness] : no muscle weakness [Headaches] : no headaches [Dizziness] : no dizziness [Tremors] : no tremors [Polydipsia] : no polydipsia [Cold Intolerance] : no cold intolerance [Heat Intolerance] : no heat intolerance

## 2022-08-25 NOTE — PHYSICAL EXAM
[Alert] : alert [Well Nourished] : well nourished [Healthy Appearance] : healthy appearance [No Acute Distress] : no acute distress [Well Developed] : well developed [No Proptosis] : no proptosis [No Lid Lag] : no lid lag [No Neck Mass] : no neck mass was observed [Thyroid Not Enlarged] : the thyroid was not enlarged [No Respiratory Distress] : no respiratory distress [No Accessory Muscle Use] : no accessory muscle use [Clear to Auscultation] : lungs were clear to auscultation bilaterally [Normal S1, S2] : normal S1 and S2 [Normal Rate] : heart rate was normal [Regular Rhythm] : with a regular rhythm [Not Tender] : non-tender [Soft] : abdomen soft [No Stigmata of Cushings Syndrome] : no stigmata of Cushings Syndrome [No Clubbing, Cyanosis] : no clubbing  or cyanosis of the fingernails [No Involuntary Movements] : no involuntary movements were seen [Normal] : normal [Full ROM] : with full range of motion [No Tremors] : no tremors [Normal Sensation on Monofilament Testing] : normal sensation on monofilament testing of lower extremities [Oriented x3] : oriented to person, place, and time [Normal Affect] : the affect was normal [Normal Insight/Judgement] : insight and judgment were intact [Normal Mood] : the mood was normal [Foot Ulcers] : no foot ulcers [Diminished Throughout Both Feet] : normal tactile sensation with monofilament testing throughout both feet

## 2022-08-25 NOTE — ASSESSMENT
[Importance of Diet and Exercise] : importance of diet and exercise to improve glycemic control, achieve weight loss and improve cardiovascular health [Hypoglycemia Management] : hypoglycemia management [Self Monitoring of Blood Glucose] : self monitoring of blood glucose [Retinopathy Screening] : Patient was referred to ophthalmology for retinopathy screening [FreeTextEntry1] : Patient is a 42 F with T1D, asthma, GERD, HTN, HLD, h/o RA here for follow up for T1D\par \par 1. T1D\par - HgB A1C: 5.8\par - Blood pressure: 126/84\par - Complications: none\par - Aspirin: no\par - Most recent urine microalbumin check today      . ACE-I/ARB: olmesartan 40 mg daily \par - Most recent LDL:   check today  . On statin:  yes\par - Opthalmology up to date: no, advised for yearly check up\par - Podiatry up to date: no, advised for yearly check up\par - Patient to call for persistent glucose < 70 or > 300\par - Patient counseled on the importance of consistent carbohydrate diet and regular physical activity \par - Advised patient to continue checking FSG and to bring meter to all visits \par - Symptoms of hypoglycemia discussed, discussed treatment with 4 ounces of juice and 3-4 glucose tablets. Glucagon prescribed. \par - Medications changes: no changes to pump setting \par - Discussed using exercise mode when taking long walk \par - Discussed bolusing at least 10-15 minutes before eating and doing extended boluses for high fat meals\par \par 2. HTN\par - /84\par - On amlodipine and omlesartan and HCTZ 40-25 mg daily \par \par \par 3. HLD\par - Repeat lipid profile today\par - Continue with rosuvastatin 10 mg daily \par \par 4. Vitamin D deficiency \par - Check vitamin D level today\par \par 5. Obesity \par - BMI 39\par - Discussed eating regular meals to avoid over snacking at bed time \par - Encourge exercise \par \par FOLLOW UP: I recommend that next visit for diabetes care be in 3 month\par \par To do at next clinic visit\par - May need to adjust carb ratio at dinner time if has persistently elevated sugar after dinner \par - Weight loss\par \par \par Jayleen Harris MD\par Endocrinology, diabetes and metabolism\par

## 2022-08-26 LAB
25(OH)D3 SERPL-MCNC: 31.5 NG/ML
ALBUMIN SERPL ELPH-MCNC: 4.3 G/DL
ALP BLD-CCNC: 67 U/L
ALT SERPL-CCNC: 7 U/L
ANION GAP SERPL CALC-SCNC: 11 MMOL/L
AST SERPL-CCNC: 16 U/L
BILIRUB SERPL-MCNC: 0.3 MG/DL
BUN SERPL-MCNC: 16 MG/DL
C PEPTIDE SERPL-MCNC: <0.1 NG/ML
CALCIUM SERPL-MCNC: 9.2 MG/DL
CHLORIDE SERPL-SCNC: 101 MMOL/L
CHOLEST SERPL-MCNC: 132 MG/DL
CO2 SERPL-SCNC: 28 MMOL/L
CREAT SERPL-MCNC: 1.1 MG/DL
CREAT SPEC-SCNC: 60 MG/DL
EGFR: 65 ML/MIN/1.73M2
GLUCOSE SERPL-MCNC: 130 MG/DL
HDLC SERPL-MCNC: 59 MG/DL
LDLC SERPL CALC-MCNC: 60 MG/DL
MICROALBUMIN 24H UR DL<=1MG/L-MCNC: <1.2 MG/DL
MICROALBUMIN/CREAT 24H UR-RTO: NORMAL MG/G
NONHDLC SERPL-MCNC: 73 MG/DL
POTASSIUM SERPL-SCNC: 4 MMOL/L
PROT SERPL-MCNC: 7.7 G/DL
SODIUM SERPL-SCNC: 140 MMOL/L
TRIGL SERPL-MCNC: 63 MG/DL
TSH SERPL-ACNC: 1.35 UIU/ML

## 2022-10-17 ENCOUNTER — NON-APPOINTMENT (OUTPATIENT)
Age: 42
End: 2022-10-17

## 2022-10-17 ENCOUNTER — APPOINTMENT (OUTPATIENT)
Dept: CARDIOLOGY | Facility: CLINIC | Age: 42
End: 2022-10-17

## 2022-10-17 ENCOUNTER — RX RENEWAL (OUTPATIENT)
Age: 42
End: 2022-10-17

## 2022-10-17 VITALS
HEIGHT: 62 IN | SYSTOLIC BLOOD PRESSURE: 132 MMHG | DIASTOLIC BLOOD PRESSURE: 96 MMHG | TEMPERATURE: 97.4 F | WEIGHT: 234 LBS | OXYGEN SATURATION: 99 % | HEART RATE: 98 BPM | BODY MASS INDEX: 43.06 KG/M2 | RESPIRATION RATE: 16 BRPM

## 2022-10-17 PROCEDURE — 93000 ELECTROCARDIOGRAM COMPLETE: CPT

## 2022-10-17 PROCEDURE — 99214 OFFICE O/P EST MOD 30 MIN: CPT

## 2022-10-17 NOTE — DISCUSSION/SUMMARY
[FreeTextEntry1] : Dr. Weinberg-(PRIOR VISIT and PMH WITH Dr. Weinberg): \par This is a 41-year-old female past medical history significant for insulin-dependent diabetes mellitus, status post COVID-19 infection in May 2022 (treated with monoclonal antibody infusion), hypertension, status post recent pneumonia ( 3 ) times  status post  section, status post nasal surgery, comes in for cardiac follow up evaluation.  \par \par She has no history of rheumatic fever.  Her cardiac risk factors Include hypertension, insulin-dependent diabetes mellitus.\par \par The patient had a normal exercise stress test May 24, 2022.\par \par The patient is maintaining a good diet, and trying to incorporate more exercise in her daily routine.\par She will have new blood work later today for lipid panel.  Her blood pressure is under good control.\par \par Blood work done 2020 demonstrated a cholesterol of 149, triglycerides of 63, HDL 61, and LDL direct of 73 mg/dL.\par \par Electrocardiogram done 2020 demonstrated normal sinus rhythm rate of 85 bpm is otherwise unremarkable.\par \par The patient had a Holter monitor done 2020 which demonstrated no significant arrhythmia or heart block.  Patient had 3 isolated atrial premature contractions and one premature ventricular contraction.\par \par Blood work done 2021 demonstrated cholesterol 136, HDL 54, LDL calculated 65, non-HDL cholesterol 81 mg/dL.\par Given the patient's diabetic state and increased LDL cholesterol, I have recommended she start on lipid-lowering therapy.  The patient is not breast-feeding and does not plan to get pregnant again.\par She clearly understands she not be on statin therapy (with Crestor), if she gets pregnant.  She understands she must contact me immediately if she is trying to get pregnant or gets pregnant accidentally and discontinue Crestor therapy.\par \par The patient understands that aerobic exercises must be increased to 40 minutes 4 times per week. A detailed discussion of lifestyle modification was done today. The patient has a good understanding of the diagnosis, and treatment plan. Lifestyle modification was also outlined.

## 2022-10-17 NOTE — PHYSICAL EXAM
[General Appearance - Well Developed] : well developed [Normal Appearance] : normal appearance [Well Groomed] : well groomed [General Appearance - Well Nourished] : well nourished [No Deformities] : no deformities [General Appearance - In No Acute Distress] : no acute distress [Normal Oral Mucosa] : normal oral mucosa [Normal Oropharynx] : normal oropharynx [JVD Elevated _____cm] : JVD elevated [unfilled] ~U cm above clavicle [Normal Jugular Venous V Waves Present] : normal jugular venous V waves present [Respiration, Rhythm And Depth] : normal respiratory rhythm and effort [Exaggerated Use Of Accessory Muscles For Inspiration] : no accessory muscle use [Auscultation Breath Sounds / Voice Sounds] : lungs were clear to auscultation bilaterally [Bowel Sounds] : normal bowel sounds [Abdomen Soft] : soft [Abnormal Walk] : normal gait [Gait - Sufficient For Exercise Testing] : the gait was sufficient for exercise testing [Nail Clubbing] : no clubbing of the fingernails [Cyanosis, Localized] : no localized cyanosis [Skin Color & Pigmentation] : normal skin color and pigmentation [Skin Turgor] : normal skin turgor [] : no rash [Oriented To Time, Place, And Person] : oriented to person, place, and time [Affect] : the affect was normal [Mood] : the mood was normal [No Anxiety] : not feeling anxious [Normal Rate] : normal [I] : a grade 1 [No Abnormalities] : the abdominal aorta was not enlarged and no bruit was heard [Well Developed] : well developed [Well Nourished] : well nourished [No Acute Distress] : no acute distress [Normal Conjunctiva] : normal conjunctiva [Normal Venous Pressure] : normal venous pressure [No Carotid Bruit] : no carotid bruit [Normal S1, S2] : normal S1, S2 [No Murmur] : no murmur [No Rub] : no rub [5th Left ICS - MCL] : palpated at the 5th LICS in the midclavicular line [Normal] : normal [No Precordial Heave] : no precordial heave was noted [Tachycardia] : tachycardic [Rhythm Regular] : regular [Normal S1] : normal S1 [Normal S2] : normal S2 [No Gallop] : no gallop heard [II] : a grade 2 [No Pitting Edema] : no pitting edema present [2+] : left 2+ [Clear Lung Fields] : clear lung fields [Good Air Entry] : good air entry [No Respiratory Distress] : no respiratory distress  [Soft] : abdomen soft [Non Tender] : non-tender [No Masses/organomegaly] : no masses/organomegaly [Normal Bowel Sounds] : normal bowel sounds [Normal Gait] : normal gait [No Edema] : no edema [No Cyanosis] : no cyanosis [No Clubbing] : no clubbing [No Varicosities] : no varicosities [No Rash] : no rash [No Skin Lesions] : no skin lesions [Moves all extremities] : moves all extremities [No Focal Deficits] : no focal deficits [Normal Speech] : normal speech [Alert and Oriented] : alert and oriented [Normal memory] : normal memory [FreeTextEntry1] : carotid murmur vs bruit [S3] : no S3 [S4] : no S4 [Right Carotid Bruit] : no bruit heard over the right carotid [Left Carotid Bruit] : no bruit heard over the left carotid

## 2022-10-17 NOTE — REASON FOR VISIT
[CV Risk Factors and Non-Cardiac Disease] : CV risk factors and non-cardiac disease [Arrhythmia/ECG Abnorrmalities] : arrhythmia/ECG abnormalities [Hyperlipidemia] : hyperlipidemia [Follow-Up - Clinic] : a clinic follow-up of [Dyspnea] : dyspnea [Hypertension] : hypertension [Palpitations] : palpitations [FreeTextEntry1] : sinus tachycardia

## 2022-10-17 NOTE — ASSESSMENT
[FreeTextEntry1] : This is a 42 year year old female here today for follow up cardiac evaluation. \par She has a past medical history significant for  significant for insulin-dependent diabetes mellitus (type 1), status post COVID-19 infection in May 2022 (treated with monoclonal antibody infusion), hypertension, status post recent pneumonia ( 3 ) times  status post  section, status post nasal surgery.\par \par CHIEF COMPLAINT:\par Today she is feeling generally well and does not have any complaints at this time.  She is currently prescribed amlodipine 10 mg daily, olmesartan hydrochlorothiazide 40-25 mg daily and rosuvastatin 10 mg daily.  She is interested in weight loss management.\par \par She has no history of rheumatic fever.  Her cardiac risk factors Include hypertension, insulin-dependent diabetes mellitus.\par \par BLOOD PRESSURE:\par -BP is borderline elevated in today's exam however she states that she did not take her morning blood pressure medication yet today because she was trying to rush out the door.\par \par -I have discussed the importance of maintaining good BP control and reviewed the newest guidelines with the patient while re-enforcing dietary sodium restrictions to no more than 2-3 g daily, DASH diet, life style modifications as well as the goal of maintaining ideal body weight with the patient today. I have advised the patient to avoid the use of over-the-counter medications/ supplements especially NSAIDS.\par \par I have reviewed with Ms. JOHNSON that serious health consequences can occur when blood pressure is not well controlled and the need for strict compliance with medication and that optimal control can significantly reduce the risk of cardiovascular disease stroke, heart attack and other organ damage. They verbally expressed understanding of the fore mentioned serious health consequences to me today.\par \par BLOOD WORK:\par -New blood work was done 10/17/2022 to evaluate lipid profile, CBC, BMP, hepatic function, A1C and TSH\par \par CHOLESTEROL CONTROL:\par -Patient will continue the advised  TLC diet and to continue follow-up for treatment of hyperlipidemia and repeat blood testing with diet and exercise. I have discussed different exercises and the importance of maintenance of optimal body weight. The importance of staying within guidelines and recommendations was stressed to the patient today and they acknowledged that they understand this to me verbally.\par \par  -Ms. JOHNSON was educated and advised that failure to follow-up with my medical recommendations to lower cholesterol can result in severe health consequences therefore, they will continuing a low saturated and low fat diet and to avoid excessive carbohydrates to help reduce triglycerides and that lowering LDL levels is associated with a significant decrease in serious cardiac events including but not limited to heart attack stroke and overall death. We will continue lipid lowering agents as advised based on blood test results and the patient understands to call if they develop severe muscle discomfort or if they have a reddish tinted discoloration in their urine.\par \par DIABETIC CONTROL:\par I will advise the patient to keep try to stay on a low carbohydrate diet lose weight and exercise to reduce spikes in their blood sugar.  The importance of monitoring blood sugar regularly and HgBA1c level were reviewed. I have also discussed annual eye exams to prevent blindness,  monitoring urine microalbumin regularly to check for kidney damage and the importance of proper foot care and regularly checking feet to prevent sores and possibly loss of limbs was reviewed. \par \par TESTING/REPORTS:\par -EKG done Oct 17, 2022 which demonstrated regular sinus tachycardia rhythm with nonspecific ST-T wave changes, BPM of 99.  Her baseline rhythm is tachycardia.\par \par -Echocardiogram done May 24, 2022 demonstrated minimal to mild mitral, tricuspid, pulmonic regurgitation with normal left ventricular systolic function ejection fraction of 66%.\par \par -The patient had a normal exercise stress test May 24, 2022.\par \par -Electrocardiogram done 2020 demonstrated normal sinus rhythm rate of 85 bpm is otherwise unremarkable.\par \par -The patient had a Holter monitor done 2020 which demonstrated no significant arrhythmia or heart block.  Patient had 3 isolated atrial premature contractions and one premature ventricular contraction.\par \par PLAN:\par -She will continue with her current medications and will contact the office if she is having any complaints between now and their next follow up appointment.\par -She is interested in injectable therapy for weight loss management.\par \par I have discussed the plan of care with Ms. DAVID JOHNSON  and she  will follow up in 3 months. She is compliant with all of her medications.\par \par The patient understands that aerobic exercises must be increased to minutes 4 times/week and a detailed discussion of lifestyle modification was done today. \par The patient has a good understanding of the diagnosis, treatment plan and lifestyle modification. \par She will contact me at the office for any questions with their care or any changes in their health status.\par \par \par Jovanna MORRISON

## 2022-10-18 ENCOUNTER — RX RENEWAL (OUTPATIENT)
Age: 42
End: 2022-10-18

## 2022-11-27 ENCOUNTER — NON-APPOINTMENT (OUTPATIENT)
Age: 42
End: 2022-11-27

## 2022-12-02 ENCOUNTER — APPOINTMENT (OUTPATIENT)
Dept: ENDOCRINOLOGY | Facility: CLINIC | Age: 42
End: 2022-12-02
Payer: COMMERCIAL

## 2022-12-15 ENCOUNTER — APPOINTMENT (OUTPATIENT)
Dept: ENDOCRINOLOGY | Facility: CLINIC | Age: 42
End: 2022-12-15
Payer: COMMERCIAL

## 2022-12-15 VITALS
HEIGHT: 62 IN | OXYGEN SATURATION: 99 % | SYSTOLIC BLOOD PRESSURE: 128 MMHG | BODY MASS INDEX: 41.41 KG/M2 | HEART RATE: 106 BPM | DIASTOLIC BLOOD PRESSURE: 80 MMHG | WEIGHT: 225 LBS

## 2022-12-15 PROCEDURE — 83036 HEMOGLOBIN GLYCOSYLATED A1C: CPT | Mod: QW

## 2022-12-15 PROCEDURE — 95251 CONT GLUC MNTR ANALYSIS I&R: CPT

## 2022-12-15 PROCEDURE — 99215 OFFICE O/P EST HI 40 MIN: CPT | Mod: 25

## 2022-12-15 NOTE — HISTORY OF PRESENT ILLNESS
[FreeTextEntry1] : Patient is a 42 F with history of asthma, T1DM, GERD here for follow up. \par \par FH: aunt has T2D\par SH: denies smoking or alcohol use\par Menstrual history: regular, has IUD placed in , due to removal in  \par \par T1DM \par Diabetes history: at age 23-24. Went for her physical, had high glucose in in the urine, diagnosed with T2D initially. Got pregnant one year later and was switched from Metformin to Insulin. After pregnancy, she was told that she is insulin dependent. Have been on insulin since . \par \par Most recent A1C 5.8 2022\par \par Diabetes complications:\par Neuropathy: no\par Retinopathy: no retinopathy (last eye exam )\par Nephropathy: negative ACR in 2022 (most recent Cr 1.03, GFR 63)\par CAD/MI: none\par DKA: twice, most recent 2012, had flu was severely dehydrated\par \par Current DM medications: \par Started with Medtronic pump since . Started on Tandem with Control IQ 2016 use [employer] XC change it every 3 days. Also rotate in the abdomen.  \par Diabetes Therapy Regimen \par Pump Model: Tandem \par Basal Rate 1.6  \par Carb Ratios 8 \par Sensitvity 1:30    \par BG Target Ranges  120\par \par Past DM medications: \par Used to be on metformin before pregnancy \par  \par Finger sticks: \par Sensor worn: dexcom G7 wear it on the abdomen \par Sharing code [id number]\par AVG B\par \par Time period reviewed: -\par TIR % 86\par High % 7\par Very high  % 1\par Low % 4\par Very low % 2\par SD 40\par GMI% 6.3\par Average glucose:124\par Sensor usage: 100\par Pattern: Postprandial hyperglycemia after dinner. usually bolus at the same time when she starts eating but improving \par \par Diet: \par Breakfast: egg white, sometimes skips, improving \par Lunch:  sometimes skips lunch, chickens nuggets, salad\par Dinner: rice, chicken stew, pasta\par Snacks: doritos, potato chips, chips, pineapple \par Drinks: water, diet soda, water down juice\par Exercise: walk in the morning \par \par # HLD\par On atorvastatin \par last LDL 60 2022\par \par TSH 2.21\par \par # Vitamin D deficiency \par last vitamin D level 23.5 10/21/2021\par No longer on supplement. \par \par  visit:  had date snacks at bedtime, did not bolus enough.  Had hypoglycemia to 7-8 pm, was running around a lot yesterday. Had symptoms of dizziness, tired. Has glucose tablet and glucagon at home. Most of the time gives bolus and eats at the same time. \par \par 12/15 visit: had flu after thanksgi.  had ham sandwich. Daughter's concert on Tuesday so she ate late, so she was hyperglycemic going to bed. Trying to bolus at least 10 minutes before she eats.

## 2022-12-15 NOTE — ASSESSMENT
[FreeTextEntry1] : Patient is a 42 F with T1D, asthma, GERD, HTN, HLD, h/o RA here for follow up for T1D\par \par 1. T1D\par - HgB A1C: 5.3 \par - Complications: none\par - Aspirin: no\par - Most recent urine microalbumin negative 08/2022    . ACE-I/ARB: olmesartan 40 mg daily \par - Most recent LDL:   check today  . On statin:  yes\par - Opthalmology up to date: no, advised for yearly check up\par - Podiatry up to date: no, advised for yearly check up\par - Patient to call for persistent glucose < 70 or > 300\par - Patient counseled on the importance of consistent carbohydrate diet and regular physical activity \par - Advised patient to continue checking FSG and to bring meter to all visits \par - Symptoms of hypoglycemia discussed, discussed treatment with 4 ounces of juice and 3-4 glucose tablets. Glucagon prescribed. \par - Medications changes: Change carb ratio to 1:7 for dinner time \par - Discussed using exercise mode when taking long walk \par - Discussed bolusing at least 10-15 minutes before eating and doing extended boluses for high fat meals\par \par 2. HTN\par - /80\par - On amlodipine and omlesartan and HCTZ 40-25 mg daily \par \par \par 3. HLD\par - LDL  60 08/2022 at goal \par - Continue with rosuvastatin 10 mg daily \par \par 4. Vitamin D deficiency \par - Vitamin D level 31.5 08/2022\par - Continue with vitamin D supplement \par \par 5. Obesity \par - BMI 39\par - Discussed eating regular meals to avoid over snacking at bed time \par - Encourge exercise \par - Patient asked me about potentially starting GLP1RA. We discussed that GLP1RA is not FDA approved for use in patient's T1D  because studies involving GLP1RA did not include T1D diabetes population. We discussed potential side effects including GI upset, patient would like to hold off and do trial of lifestyle first. We could potentially revisit the idea if she has no success in lifestyle modifications. \par \par FOLLOW UP: I recommend that next visit for diabetes care be in 6 month\par \par To do at next clinic visit\par - Monitor CGM\par - Weight loss\par \par \par Jayleen Harris MD\par Endocrinology, diabetes and metabolism\par

## 2022-12-16 LAB — HBA1C MFR BLD HPLC: 5.3

## 2022-12-20 ENCOUNTER — RX RENEWAL (OUTPATIENT)
Age: 42
End: 2022-12-20

## 2022-12-22 ENCOUNTER — NON-APPOINTMENT (OUTPATIENT)
Age: 42
End: 2022-12-22

## 2022-12-22 ENCOUNTER — APPOINTMENT (OUTPATIENT)
Dept: PULMONOLOGY | Facility: CLINIC | Age: 42
End: 2022-12-22

## 2022-12-22 VITALS
SYSTOLIC BLOOD PRESSURE: 116 MMHG | TEMPERATURE: 97.3 F | OXYGEN SATURATION: 97 % | RESPIRATION RATE: 16 BRPM | DIASTOLIC BLOOD PRESSURE: 72 MMHG | WEIGHT: 225 LBS | HEIGHT: 62 IN | HEART RATE: 106 BPM | BODY MASS INDEX: 41.41 KG/M2

## 2022-12-22 DIAGNOSIS — R06.02 SHORTNESS OF BREATH: ICD-10-CM

## 2022-12-22 PROCEDURE — 94010 BREATHING CAPACITY TEST: CPT

## 2022-12-22 PROCEDURE — 95012 NITRIC OXIDE EXP GAS DETER: CPT

## 2022-12-22 PROCEDURE — 99214 OFFICE O/P EST MOD 30 MIN: CPT | Mod: 25

## 2022-12-22 NOTE — HISTORY OF PRESENT ILLNESS
[FreeTextEntry1] : Ms. Champagne is a 42 year old female presenting to the office today for a follow up visit for allergies, asthma, GERD, and poor sleep. Her chief complaint is weight\par \par -she notes feeling generally well\par -s/p influenza after Thanksgiving\par -she notes she was using her inhalers frequently during the flu\par -she notes exercising (walking)\par -she notes feeling almost completely recovered since flu infection\par -she notes she had a cough during the flu which has resolved\par -she notes pressure in her ears intermittently\par -she notes dizziness when she bends down and gets up quickly\par -she notes that she has lost weight (7 lbs) since flu\par -she notes her back has improved due to walking\par \par -patient denies any headaches, nausea, vomiting, fever, chills, sweats, chest pain, chest pressure, palpitations, wheezing, fatigue, diarrhea, constipation, dysphagia, leg swelling, leg pain, itchy eyes, itchy ears, heartburn, reflux or sour taste in the mouth

## 2022-12-22 NOTE — ADDENDUM
[FreeTextEntry1] : Documented by Darian Fuentes acting as a scribe for Dr. Herber Day on 12/22/2022.\par \par All medical record entries made by the Scribe were at my, Dr. Herber Day's, direction and personally dictated by me on 12/22/2022. I have reviewed the chart and agree that the record accurately reflects my personal performance of the history, physical exam, assessment and plan. I have also personally directed, reviewed, and agree with the discharge instructions.

## 2022-12-22 NOTE — ASSESSMENT
[FreeTextEntry1] : Ms. Champagne is a 42 year old female with a history of DM, HLD, heart murmur, HTN, asthma, allergies, GERD, recent anemia, rheumatoid arthritis, presenting to the office for a follow up visit. - improved; vaccinated x 2 Covid 19- #1 issues is post influenza sinus issues\par \par The patient's shortness of breath is multifactorial due to:\par -pulmonary disease \par      -asthma\par -poor breathing mechanics \par -overweight/out of shape\par -?cardiac disease \par \par problem 1: asthma- contorlled \par -continue Symbicort (160) 2 inhalations BID\par -continue Incruse 1 inhalation daily\par -continue to use Singulair 10 mg before bed\par -continue Ventolin rescue inhaler 2 inhalations before exercise, Q6H \par -recommended to cover her nose and mouth in the cold air\par \par -Asthma is  believed to be caused by inherited (genetic) and environmental factor, but its exact cause is unknown. Asthma may be triggered by allergens, lung infections, or irritants in the air. Asthma triggers are different for each person\par -Inhaler technique reviewed as well as oral hygiene techniques reviewed with patient. Avoidance of cold air, extremes of temperature, rescue inhaler should be used before exercise. Order of medication reviewed with patient. Recommended use of a cool mist humidifier in the bedroom. \par \par problem 2: allergies and sinuses-active post influenza\par -Add Qnasl 1 sniff BID \par -continue Olopatadine 0.6% at 1 sniff/nostril BID \par -continue to use OTC Claritin QAM\par -continue to use nasal saline\par -continue to use Xyzal 5 mg before bed \par -Environmental measures for allergies were encouraged including mattress and pillow cover, air purifier, and environmental controls. \par \par Problem 2A: R/o immune deficiency\par -s/p blood work: strep pneumonia titers (low), IgG levels (-), quantitative immunoglobulins (-)\par -Patient received Pneumovax in 7/2020, Prevnar-13 12/2020\par -Due to the fact that this pt has had more infections than would be expected and immunological blood work is indicated this would include: IgG subclasses, quantitative immunoglobulins, Strep pneumoniae titers as well as Vitamin D levels. Based on this blood work we will be able to decide where the pt needs additional pneumococcal vaccine either Prevnar 13 or pneumovax. Immunology evaluation will also be potentially indicated.\par \par problem 3: GERD \par -continue Omeprazole 40 mg before breakfast\par -Rule of 2s: avoid eating too much, eating too late, eating too spicy, eating two hours before bed\par -Things to avoid including overeating, spicy foods, tight clothing, eating within three hours of bed, this list is not all inclusive. \par -For treatment of reflux, possible options discussed including diet control, H2 blockers, PPIs, as well as coating motility agents discussed as treatment options. Timing of meals and proximity of last meal to sleep were discussed. If symptoms persist, a formal gastrointestinal evaluation is needed. \par \par Problem 4: anemia\par -continue to follow up with hematology, endocrinology\par \par problem 5: overweight (Tomas / Tate)\par - Recommended "10-Day Detox" by Joseluis Bianchi for 2-3 weeks followed by probiotics \par -recommended "MUNIQ" OTC supplement \par -Weight loss, exercise, and diet control were discussed and are highly encouraged. Treatment options were given such as, aqua therapy, and contacting a nutritionist. Recommended to use the elliptical, stationary bike, less use of treadmill.  Obesity is associated with worsening asthma, shortness of breath, and potential for cardiac disease, diabetes, and other underlying medical conditions. \par \par problem 6: mild sleep apnea\par -continue to use Oxy-Aid and Breathe Rite strips, Provent and Theravent, Somnifix\par -recommended to use an oral appliance and she was given the names of Dr. Crescencio Cortez \par -based on her fatigue, EDS, snoring, and elevated mallampati class\par -Discussed the risks/associations with coronary artery disease, atrial fibrillation, arrhythmia, memory loss, issues with concentration, hypertension, nocturia, chronic reflux/Patrick’s esophagus some but not all inclusive. Treatment options discussed including CPAP/BiPAP machine, oral appliance, ProVent therapy, Oxy-Aid by [employer], new technologies, or positional sleep.\par \par problem 7: hand pain (quiet)\par -recommended SPM and quercetin\par -s/p Carpal tunnel (resolved) \par -recommended to have an evaluation with Shaquille Aguilar for hand splints or to have an evaluation for a orthotist \par -she can also try OTC wrist splints \par \par Problem 8: Cardiac\par -Continue to follow up with Dr. Weinberg.\par \par Problem 9: health maintenance\par -Recommended [employer]loan[employer]ches \par -recommended Philipp Sloan's foundation stretches \par -s/p Pfizer COVID 19 vaccine x 4\par -s/p influenza vaccine 2021\par -recommended strep pneumonia vaccines after age 65: Prevnar-13 vaccine, followed by Pneumo vaccine 23 one year following\par -recommended early intervention for URIs\par -recommended regular osteoporosis evaluations\par -recommended early dermatological evaluations\par -recommended after the age of 50 to the age of 70, colonoscopy every 5 years \par \par F/U in 4 months\par She is encouraged to call with any changes, concerns or questions. \par

## 2022-12-22 NOTE — PROCEDURE
[FreeTextEntry1] : Feno was 24; a normal value being less than 25. Fractional exhaled nitric oxide (FENO) is regarded as a simple, noninvasive method for assessing eosinophilic airway inflammation. Produced by a variety of cells within the lung, nitric oxide (NO) concentrations are generally low in healthy individuals. However, high concentrations of NO appear to be involved in nonspecific host defense mechanisms and chronic inflammatory  diseases such as asthma. The American Thoracic Society (ATS) therefore recommended using FENO to aid in the diagnosis and monitoring of eosinophilic airway inflammation and asthma, and for identifying steroid responsive individuals whose chronic respiratory symptoms may be caused by airway inflammation \par \par PFT revealed mild restrictive dysfunction, with a FEV1 of 2.08L, which is 74% of predicted, with a normal flow volume loop

## 2023-04-17 ENCOUNTER — APPOINTMENT (OUTPATIENT)
Dept: CARDIOLOGY | Facility: CLINIC | Age: 43
End: 2023-04-17
Payer: COMMERCIAL

## 2023-04-17 ENCOUNTER — LABORATORY RESULT (OUTPATIENT)
Age: 43
End: 2023-04-17

## 2023-04-17 ENCOUNTER — NON-APPOINTMENT (OUTPATIENT)
Age: 43
End: 2023-04-17

## 2023-04-17 VITALS
SYSTOLIC BLOOD PRESSURE: 108 MMHG | HEART RATE: 100 BPM | DIASTOLIC BLOOD PRESSURE: 70 MMHG | WEIGHT: 233 LBS | TEMPERATURE: 98 F | RESPIRATION RATE: 16 BRPM | BODY MASS INDEX: 42.88 KG/M2 | HEIGHT: 62 IN | OXYGEN SATURATION: 99 %

## 2023-04-17 DIAGNOSIS — Z86.79 PERSONAL HISTORY OF OTHER DISEASES OF THE CIRCULATORY SYSTEM: ICD-10-CM

## 2023-04-17 PROCEDURE — 99214 OFFICE O/P EST MOD 30 MIN: CPT | Mod: 25

## 2023-04-17 PROCEDURE — 93000 ELECTROCARDIOGRAM COMPLETE: CPT

## 2023-04-17 NOTE — ASSESSMENT
[FreeTextEntry1] : This is a 42 year year old female here today for follow up cardiac evaluation. \par She has a past medical history significant for  significant for insulin-dependent diabetes mellitus (type 1), status post COVID-19 infection in May 2022 (treated with monoclonal antibody infusion), hypertension, status post recent pneumonia ( 3 ) times  status post  section, status post nasal surgery.\par \par CHIEF COMPLAINT:\par Today she is feeling generally well and does not have any complaints at this time.  She is still interested in weight loss injectable therapy but was told by her endocrinologist that she is not a candidate for Ozempic since she is a type I diabetic.  I asked that she ask her endocrinologist about possibly going on Wegovy and she will keep her office posted if she is able to start this medication through our office.  Otherwise she is feeling well from a cardiac standpoint.  She may have occasional dyspnea on exertion when going up some steps/walking fast.\par \par She is currently prescribed amlodipine 10 mg daily, olmesartan hydrochlorothiazide 40-25 mg daily and rosuvastatin 10 mg daily.  She is interested in weight loss management.\par \par She has no history of rheumatic fever.  Her cardiac risk factors Include hypertension, insulin-dependent diabetes mellitus.\par \par BLOOD PRESSURE:\par -Pressure is controlled on today's exam.\par \par BLOOD WORK:\par -New blood work was done 2023  to evaluate lipid profile, CBC, BMP, hepatic function, A1C and TSH. \par \par TESTING/REPORTS:\par -EKG done 2023 which demonstrated regular sinus rhythm with nonspecific ST-T wave changes BPM of 100.\par \par -EKG done Oct 17, 2022 which demonstrated regular sinus tachycardia rhythm with nonspecific ST-T wave changes, BPM of 99.  Her baseline rhythm is tachycardia.\par \par -Echocardiogram done May 24, 2022 demonstrated minimal to mild mitral, tricuspid, pulmonic regurgitation with normal left ventricular systolic function ejection fraction of 66%.\par \par -The patient had a normal exercise stress test May 24, 2022.\par \par -Electrocardiogram done 2020 demonstrated normal sinus rhythm rate of 85 bpm is otherwise unremarkable.\par \par -The patient had a Holter monitor done 2020 which demonstrated no significant arrhythmia or heart block.  Patient had 3 isolated atrial premature contractions and one premature ventricular contraction.\par \par PLAN:\par -The patient is clinically stable from a cardiac standpoint on today's exam. \par -She will continue with her current medications and will contact the office if she is having any complaints between now and their next follow up appointment.\par -She is interested in injectable therapy for weight loss management and she will discuss this with her endocrinologist to see if she is approved to start Wegovy\par \par I have discussed the plan of care with Ms. DAVID JOHNSON  and she  will follow up in 3-6 months. She is compliant with all of her medications.\par \par The patient understands that aerobic exercises must be increased to minutes 4 times/week and a detailed discussion of lifestyle modification was done today. \par The patient has a good understanding of the diagnosis, treatment plan and lifestyle modification. \par She will contact me at the office for any questions with their care or any changes in their health status.\par \par \par Jovanna MORRISON

## 2023-04-19 ENCOUNTER — TRANSCRIPTION ENCOUNTER (OUTPATIENT)
Age: 43
End: 2023-04-19

## 2023-04-19 RX ORDER — OLOPATADINE HYDROCHLORIDE 665 UG/1
0.6 SPRAY, METERED NASAL
Qty: 3 | Refills: 1 | Status: DISCONTINUED | COMMUNITY
Start: 2020-12-02 | End: 2023-04-19

## 2023-04-19 RX ORDER — AZELASTINE HYDROCHLORIDE 137 UG/1
137 SPRAY, METERED NASAL
Qty: 1 | Refills: 3 | Status: COMPLETED | COMMUNITY
Start: 2020-01-06 | End: 2023-04-19

## 2023-04-19 RX ORDER — MOMETASONE FUROATE AND FORMOTEROL FUMARATE DIHYDRATE 200; 5 UG/1; UG/1
200-5 AEROSOL RESPIRATORY (INHALATION)
Qty: 3 | Refills: 1 | Status: DISCONTINUED | COMMUNITY
Start: 2021-01-04 | End: 2023-04-19

## 2023-04-19 RX ORDER — INSULIN ASPART 100 [IU]/ML
100 INJECTION, SOLUTION INTRAVENOUS; SUBCUTANEOUS
Qty: 9 | Refills: 1 | Status: DISCONTINUED | COMMUNITY
Start: 2020-10-11 | End: 2023-04-19

## 2023-04-19 RX ORDER — UMECLIDINIUM 62.5 UG/1
62.5 AEROSOL, POWDER ORAL
Qty: 1 | Refills: 3 | Status: DISCONTINUED | COMMUNITY
Start: 2020-12-02 | End: 2023-04-19

## 2023-04-20 ENCOUNTER — TRANSCRIPTION ENCOUNTER (OUTPATIENT)
Age: 43
End: 2023-04-20

## 2023-04-27 ENCOUNTER — NON-APPOINTMENT (OUTPATIENT)
Age: 43
End: 2023-04-27

## 2023-04-27 ENCOUNTER — APPOINTMENT (OUTPATIENT)
Dept: PULMONOLOGY | Facility: CLINIC | Age: 43
End: 2023-04-27
Payer: COMMERCIAL

## 2023-04-27 VITALS
HEIGHT: 62 IN | SYSTOLIC BLOOD PRESSURE: 112 MMHG | BODY MASS INDEX: 43.24 KG/M2 | OXYGEN SATURATION: 97 % | DIASTOLIC BLOOD PRESSURE: 72 MMHG | HEART RATE: 104 BPM | WEIGHT: 235 LBS | RESPIRATION RATE: 16 BRPM | TEMPERATURE: 97.3 F

## 2023-04-27 DIAGNOSIS — R06.83 SNORING: ICD-10-CM

## 2023-04-27 DIAGNOSIS — D84.9 IMMUNODEFICIENCY, UNSPECIFIED: ICD-10-CM

## 2023-04-27 DIAGNOSIS — F41.9 ANXIETY DISORDER, UNSPECIFIED: ICD-10-CM

## 2023-04-27 PROCEDURE — 95012 NITRIC OXIDE EXP GAS DETER: CPT

## 2023-04-27 PROCEDURE — 99214 OFFICE O/P EST MOD 30 MIN: CPT | Mod: 25

## 2023-04-27 PROCEDURE — 94010 BREATHING CAPACITY TEST: CPT

## 2023-04-27 RX ORDER — BECLOMETHASONE DIPROPIONATE 80 UG/1
80 AEROSOL, METERED NASAL
Qty: 3 | Refills: 1 | Status: ACTIVE | COMMUNITY
Start: 2022-12-22 | End: 1900-01-01

## 2023-04-27 NOTE — ADDENDUM
[FreeTextEntry1] : Documented by Darian Fuentes acting as a scribe for Dr. Herber Day on 04/27/2023.\par \par All medical record entries made by the Scribe were at my, Dr. Herber Day's, direction and personally dictated by me on 04/27/2023. I have reviewed the chart and agree that the record accurately reflects my personal performance of the history, physical exam, assessment and plan. I have also personally directed, reviewed, and agree with the discharge instructions.

## 2023-04-27 NOTE — ASSESSMENT
[FreeTextEntry1] : Ms. Champagne is a 42 year old female with a history of DM, HLD, heart murmur, OSAS, HTN, asthma, allergies, GERD, anemia, rheumatoid arthritis, presenting to the office for a follow up visit. - improved; vaccinated x 2 Covid 19- s/p influenza sinus issues - #1 issue is weight\par \par The patient's shortness of breath is multifactorial due to:\par -pulmonary disease \par      -asthma\par -poor breathing mechanics \par -overweight/out of shape\par -?cardiac disease \par \par problem 1: asthma- contorlled \par -continue Symbicort (160) 2 inhalations BID\par -continue Incruse 1 inhalation daily\par -continue to use Singulair 10 mg before bed\par -continue Ventolin rescue inhaler 2 inhalations before exercise, Q6H \par -recommended to cover her nose and mouth in the cold air\par \par -Asthma is  believed to be caused by inherited (genetic) and environmental factor, but its exact cause is unknown. Asthma may be triggered by allergens, lung infections, or irritants in the air. Asthma triggers are different for each person\par -Inhaler technique reviewed as well as oral hygiene techniques reviewed with patient. Avoidance of cold air, extremes of temperature, rescue inhaler should be used before exercise. Order of medication reviewed with patient. Recommended use of a cool mist humidifier in the bedroom. \par \par problem 2: allergies and sinuses-active  (s/p influenza)\par -Add Qnasl 1 sniff BID (PA)\par -continue Olopatadine 0.6% at 1 sniff/nostril BID \par -continue to use OTC Claritin QAM\par -continue to use nasal saline\par -continue to use Xyzal 5 mg before bed \par -Environmental measures for allergies were encouraged including mattress and pillow cover, air purifier, and environmental controls. \par \par Problem 2A: R/o immune deficiency\par -s/p blood work: strep pneumonia titers (low), IgG levels (-), quantitative immunoglobulins (-)\par -Patient received Pneumovax in 7/2020, Prevnar-13 12/2020\par -Due to the fact that this pt has had more infections than would be expected and immunological blood work is indicated this would include: IgG subclasses, quantitative immunoglobulins, Strep pneumoniae titers as well as Vitamin D levels. Based on this blood work we will be able to decide where the pt needs additional pneumococcal vaccine either Prevnar 13 or pneumovax. Immunology evaluation will also be potentially indicated.\par \par problem 3: GERD \par -continue Omeprazole 40 mg before breakfast\par -Rule of 2s: avoid eating too much, eating too late, eating too spicy, eating two hours before bed\par -Things to avoid including overeating, spicy foods, tight clothing, eating within three hours of bed, this list is not all inclusive. \par -For treatment of reflux, possible options discussed including diet control, H2 blockers, PPIs, as well as coating motility agents discussed as treatment options. Timing of meals and proximity of last meal to sleep were discussed. If symptoms persist, a formal gastrointestinal evaluation is needed. \par \par Problem 4: anemia\par -continue to follow up with hematology, endocrinology\par \par problem 5: overweight (Tomas / Tate)\par - Recommended "10-Day Detox" by Joseluis Bianchi for 2-3 weeks followed by probiotics \par -recommended "MUNIQ" OTC supplement \par -Weight loss, exercise, and diet control were discussed and are highly encouraged. Treatment options were given such as, aqua therapy, and contacting a nutritionist. Recommended to use the elliptical, stationary bike, less use of treadmill.  Obesity is associated with worsening asthma, shortness of breath, and potential for cardiac disease, diabetes, and other underlying medical conditions. \par \par problem 6: mild sleep apnea\par -continue to use Oxy-Aid and Breathe Rite strips, Provent and Theravent, Somnifix\par -recommended to use an oral appliance (Ethel Fan)\par -based on her fatigue, EDS, snoring, and elevated mallampati class\par -Discussed the risks/associations with coronary artery disease, atrial fibrillation, arrhythmia, memory loss, issues with concentration, hypertension, nocturia, chronic reflux/Patrick’s esophagus some but not all inclusive. Treatment options discussed including CPAP/BiPAP machine, oral appliance, ProVent therapy, Oxy-Aid by [employer]pite[employer], new technologies, or positional sleep.\par \par problem 7: hand pain (quiet)\par -recommended SPM and quercetin\par -s/p Carpal tunnel (resolved) \par -recommended to have an evaluation with Shaquille Aguilar for hand splints or to have an evaluation for a orthotist \par -she can also try OTC wrist splints \par \par Problem 8: Cardiac\par -Continue to follow up with Dr. Weinberg.\par \par Problem 9: health maintenance\par -Recommended Philipp Sloan's [employer]retches \par -recommended Philipp Sloan's foundation stretches \par -s/p Pfizer COVID 19 vaccine x 4\par -s/p influenza vaccine 2022\par -recommended strep pneumonia vaccines after age 65: Prevnar-13 vaccine, followed by Pneumo vaccine 23 one year following\par -recommended early intervention for URIs\par -recommended regular osteoporosis evaluations\par -recommended early dermatological evaluations\par -recommended after the age of 50 to the age of 70, colonoscopy every 5 years \par \par F/U in 4 months\par She is encouraged to call with any changes, concerns or questions. \par

## 2023-04-27 NOTE — PROCEDURE
[FreeTextEntry1] : PFT revealed mild restrictive dysfunction, with a FEV1 of 2.14L, which is 77% of predicted, with a normal flow volume loop

## 2023-04-27 NOTE — HISTORY OF PRESENT ILLNESS
[FreeTextEntry1] : Ms. Champagne is a 42 year old female presenting to the office today for a follow up visit for allergies, asthma, GERD, and poor sleep. Her chief complaint is weight\par \par -she notes exercising \par -she notes sinus headaches\par -she notes allergy Sx\par -she notes hoarseness \par -she notes itchy eyes for which she uses eye drops\par -she notes attempting yoga and is walking for exercise\par -she notes that she is sleeping well \par -she notes getting 7 hours of sleep \par -she notes her sleep is uninterrupted\par -she notes snoring   \par \par -patient denies any nausea, vomiting, fever, chills, sweats, chest pain, chest pressure, palpitations, coughing, wheezing, fatigue, diarrhea, constipation, dysphagia, dizziness, leg swelling, leg pain, heartburn, reflux or sour taste in the mouth

## 2023-04-28 ENCOUNTER — NON-APPOINTMENT (OUTPATIENT)
Age: 43
End: 2023-04-28

## 2023-04-28 RX ORDER — PEN NEEDLE, DIABETIC 29 G X1/2"
32G X 4 MM NEEDLE, DISPOSABLE MISCELLANEOUS
Qty: 1 | Refills: 3 | Status: ACTIVE | COMMUNITY
Start: 2023-04-28 | End: 1900-01-01

## 2023-05-15 ENCOUNTER — RX RENEWAL (OUTPATIENT)
Age: 43
End: 2023-05-15

## 2023-06-19 ENCOUNTER — NON-APPOINTMENT (OUTPATIENT)
Age: 43
End: 2023-06-19

## 2023-06-22 ENCOUNTER — APPOINTMENT (OUTPATIENT)
Dept: ENDOCRINOLOGY | Facility: CLINIC | Age: 43
End: 2023-06-22
Payer: COMMERCIAL

## 2023-06-22 VITALS
DIASTOLIC BLOOD PRESSURE: 78 MMHG | BODY MASS INDEX: 42.33 KG/M2 | SYSTOLIC BLOOD PRESSURE: 110 MMHG | HEIGHT: 62 IN | WEIGHT: 230 LBS | OXYGEN SATURATION: 98 % | HEART RATE: 95 BPM

## 2023-06-22 PROCEDURE — 99214 OFFICE O/P EST MOD 30 MIN: CPT

## 2023-06-22 NOTE — HISTORY OF PRESENT ILLNESS
[FreeTextEntry1] : Patient is a 42 F with history of asthma, T1DM, GERD here for follow up. Last visit with me 12/15/2022. \par \par FH: aunt has T2D\par SH: denies smoking or alcohol use\par Menstrual history: regular, has IUD placed in 2016, due to removal in 2023 \par \par T1DM \par Diabetes history: at age 23-24. Went for her physical, had high glucose in in the urine, diagnosed with T2D initially. Got pregnant one year later and was switched from Metformin to Insulin. After pregnancy, she was told that she is insulin dependent. Have been on insulin since 2007. \par \par Most recent A1C 5.8 08/25/2022--> 6.4 04/18/2023\par \par Diabetes complications:\par Neuropathy: no\par Retinopathy: no retinopathy (last eye exam 2019)\par Nephropathy: negative ACR in 08/2022 (most recent Cr 1.03, GFR 63)\par CAD/MI: none\par DKA: twice, most recent 12/2012, had flu was severely dehydrated\par \par Current DM medications: \par Started with Medtronic pump since 103. Started on Tandem with Control IQ 04/2016 use [employer] XC change it every 3 days. Also rotate in the abdomen.  \par Diabetes Therapy Regimen \par Pump Model: Tandem \par Basal Rate 1.6  \par Carb Ratios 8 \par Sensitvity 1:30    \par BG Target Ranges  120\par \par Currently doing injections because she changed her insurance so unable to afford pump. Taking basaglar 38 units QHS and ICR 1:8 until 4 pm, 1CR 1:7 until midnight \par \par Past DM medications: \par Used to be on metformin before pregnancy \par  \par Finger sticks: \par Sensor worn: dexcom G6 wear it on the arm \par  No sharing code\par Fasting glucose \par \par Diet: \par Breakfast: egg white, sometimes skips, improving \par Lunch:  sometimes skips lunch, chickens nuggets, salad\par Dinner: rice, chicken stew, pasta 7-8:30\par Snacks: doritos, potato chips, chips, pineapple \par Drinks: water, diet soda, water down juice\par Exercise: walk in the morning \par \par # HLD\par On atorvastatin \par last LDL 60 71 04/18/2023\par \par TSH 2.21\par \par # Vitamin D deficiency \par last vitamin D level 23.5 10/21/2021\par No longer on supplement. \par \par 8/25 visit: 8/24 had date snacks at bedtime, did not bolus enough.  Had hypoglycemia to 7-8 pm, was running around a lot yesterday. Had symptoms of dizziness, tired. Has glucose tablet and glucagon at home. Most of the time gives bolus and eats at the same time. \par \par 12/15 visit: had flu after thanksgiving. 12/14 had ham sandwich. Daughter's concert on Tuesday so she ate late, so she was hyperglycemic going to bed. Trying to bolus at least 10 minutes before she eats. \par \par 6/22/2023 visit: switched insurance because her insurance has high deductible for the insulin pump which she cannot afford. She4 is currently on MDI. Noticing that sometimes fasting glucose in the 200s usually happens with a later dinner with fatty contents. Went to urgent care on Monday and was on prednisone for 1 day for asthma excacerbation.

## 2023-06-22 NOTE — ASSESSMENT
[FreeTextEntry1] : Patient is a 42 F with T1D, asthma, GERD, HTN, HLD, h/o RA here for follow up for T1D\par \par 1. T1D\par - HgB A1C: 6.4 today likely due to her stopped the pump use\par - Complications: none\par - Aspirin: no\par - Most recent urine microalbumin negative 08/2022 repeat today    . ACE-I/ARB: olmesartan 40 mg daily \par - Most recent LDL:  71 04/2023  . On statin:  yes\par - Opthalmology up to date: no, advised for yearly check up\par - Podiatry up to date: no, advised for yearly check up\par - Patient to call for persistent glucose < 70 or > 300\par - Patient counseled on the importance of consistent carbohydrate diet and regular physical activity \par - Advised patient to continue checking FSG and to bring meter to all visits \par - Symptoms of hypoglycemia discussed, discussed treatment with 4 ounces of juice and 3-4 glucose tablets. Glucagon prescribed. \par - Medications changes: unable to assess DEXCOM data to make adjustment to insulin \par - Patient will send sharing code for rustyiy after gettng home and we will make adjustment based on that \par \par 2. HTN\par - /78\par - On amlodipine and omlesartan and HCTZ 40-25 mg daily \par \par \par 3. HLD\par - LDL  71 04/2023 at goal \par - Continue with rosuvastatin 10 mg daily \par \par 4. Vitamin D deficiency \par - Vitamin D level 31.5 08/2022\par - Continue with vitamin D supplement \par \par 5. Obesity \par - BMI 39\par - Discussed eating regular meals to avoid over snacking at bed time \par - Encourge exercise \par - Patient previously asked me about potentially starting GLP1RA. We discussed that GLP1RA is not FDA approved for use in patient's T1D  because studies involving GLP1RA did not include T1D diabetes population. We discussed potential side effects including GI upset, patient would like to hold off and do trial of lifestyle first. We could potentially revisit the idea if she has no success in lifestyle modifications. \par \par FOLLOW UP: I recommend that next visit for diabetes care be in 6 month\par \par To do at next clinic visit\par - Monitor CGM\par - Weight loss\par \par \par Jayleen Harris MD\par Endocrinology, diabetes and metabolism\par

## 2023-07-20 LAB
CREAT SPEC-SCNC: 196 MG/DL
MICROALBUMIN 24H UR DL<=1MG/L-MCNC: <1.2 MG/DL
MICROALBUMIN/CREAT 24H UR-RTO: NORMAL MG/G

## 2023-08-01 ENCOUNTER — RX RENEWAL (OUTPATIENT)
Age: 43
End: 2023-08-01

## 2023-08-03 RX ORDER — OLMESARTAN MEDOXOMIL AND HYDROCHLOROTHIAZIDE 40; 25 MG/1; MG/1
40-25 TABLET ORAL
Qty: 90 | Refills: 1 | Status: ACTIVE | COMMUNITY
Start: 2020-06-01 | End: 1900-01-01

## 2023-08-09 ENCOUNTER — APPOINTMENT (OUTPATIENT)
Dept: CARDIOLOGY | Facility: CLINIC | Age: 43
End: 2023-08-09

## 2023-09-06 ENCOUNTER — APPOINTMENT (OUTPATIENT)
Dept: PULMONOLOGY | Facility: CLINIC | Age: 43
End: 2023-09-06
Payer: COMMERCIAL

## 2023-09-06 VITALS
OXYGEN SATURATION: 96 % | SYSTOLIC BLOOD PRESSURE: 110 MMHG | RESPIRATION RATE: 20 BRPM | DIASTOLIC BLOOD PRESSURE: 70 MMHG | TEMPERATURE: 97.9 F | HEART RATE: 100 BPM | BODY MASS INDEX: 43.43 KG/M2 | HEIGHT: 62 IN | WEIGHT: 236 LBS

## 2023-09-06 DIAGNOSIS — J10.1 INFLUENZA DUE TO OTHER IDENTIFIED INFLUENZA VIRUS WITH OTHER RESPIRATORY MANIFESTATIONS: ICD-10-CM

## 2023-09-06 PROCEDURE — 94010 BREATHING CAPACITY TEST: CPT

## 2023-09-06 PROCEDURE — 95012 NITRIC OXIDE EXP GAS DETER: CPT

## 2023-09-06 PROCEDURE — 99214 OFFICE O/P EST MOD 30 MIN: CPT | Mod: 25

## 2023-09-06 RX ORDER — ALBUTEROL SULFATE 90 UG/1
108 (90 BASE) AEROSOL, METERED RESPIRATORY (INHALATION) EVERY 6 HOURS
Qty: 3 | Refills: 1 | Status: ACTIVE | COMMUNITY
Start: 2020-12-02 | End: 1900-01-01

## 2023-09-06 NOTE — ADDENDUM
[FreeTextEntry1] : Documented by Javier Maradiaga acting as a scribe for Dr. Herber Day on 09/06/2023.   All medical record entries made by the Scribe were at my, Dr. Herber Day's, direction and personally dictated by me on 09/06/2023. I have reviewed the chart and agree that the record accurately reflects my personal performance of the history, physical exam, assessment and plan. I have also personally directed, reviewed, and agree with the discharge instructions.

## 2023-09-06 NOTE — PROCEDURE
[FreeTextEntry1] : PFT reveals mild restrictive and obstructive dysfunction, with an FEV1 of 1.86 L, which is 67% of predicted, with a normal flow volume loop. PFTs were performed to evaluate for Asthma  FENO was 47; a normal value being less than 25 Fractional exhaled nitric oxide (FENO) is regarded as a simple, noninvasive method for assessing eosinophilic airway inflammation. Produced by a variety of cells within the lung, nitric oxide (NO) concentrations are generally low in healthy individuals. However, high concentrations of NO appear to be involved in nonspecific host defense mechanisms and chronic inflammatory diseases such as asthma. The American Thoracic Society (ATS) therefore has recommended using FENO to aid in the diagnosis and monitoring of eosinophilic airway inflammation and asthma, and for identifying steroid responsive individuals whose chronic respiratory symptoms may be caused by airway inflammation.

## 2023-09-06 NOTE — HISTORY OF PRESENT ILLNESS
[FreeTextEntry1] : Ms. Champagne is a 43 year old female presenting to the office today for a follow up visit for allergies, asthma, GERD, and poor sleep. Her chief complaint is weight  - she notes sleeping better - she notes her energy levels are better - she notes dealing with allergic sx - she notes watery and itchy eyes - she notes some hoarseness only with PND - she notes getting sick in June and July where she was given steroids - she notes having a fever when sick in July but not in June - she notes her biggest complaints are her allergies and her joint pain - she notes taking vitamin D  -she denies any headaches, nausea, emesis, fever, chills, sweats, chest pain, chest pressure, coughing, wheezing, palpitations, constipation, diarrhea, vertigo, dysphagia, heartburn, reflux, itchy eyes, itchy ears, leg swelling, leg pain, myalgias, or sour taste in the mouth.

## 2023-09-14 ENCOUNTER — LABORATORY RESULT (OUTPATIENT)
Age: 43
End: 2023-09-14

## 2023-09-14 ENCOUNTER — APPOINTMENT (OUTPATIENT)
Dept: CARDIOLOGY | Facility: CLINIC | Age: 43
End: 2023-09-14
Payer: COMMERCIAL

## 2023-09-14 ENCOUNTER — NON-APPOINTMENT (OUTPATIENT)
Age: 43
End: 2023-09-14

## 2023-09-14 VITALS
HEIGHT: 62 IN | SYSTOLIC BLOOD PRESSURE: 121 MMHG | RESPIRATION RATE: 16 BRPM | TEMPERATURE: 97.2 F | HEART RATE: 97 BPM | DIASTOLIC BLOOD PRESSURE: 82 MMHG | BODY MASS INDEX: 42.69 KG/M2 | OXYGEN SATURATION: 98 % | WEIGHT: 232 LBS

## 2023-09-14 DIAGNOSIS — Z86.16 PERSONAL HISTORY OF COVID-19: ICD-10-CM

## 2023-09-14 DIAGNOSIS — Z87.891 PERSONAL HISTORY OF NICOTINE DEPENDENCE: ICD-10-CM

## 2023-09-14 PROCEDURE — 93000 ELECTROCARDIOGRAM COMPLETE: CPT

## 2023-09-14 PROCEDURE — 99214 OFFICE O/P EST MOD 30 MIN: CPT | Mod: 25

## 2023-11-06 ENCOUNTER — APPOINTMENT (OUTPATIENT)
Dept: CARDIOLOGY | Facility: CLINIC | Age: 43
End: 2023-11-06
Payer: COMMERCIAL

## 2023-11-06 VITALS
BODY MASS INDEX: 42.88 KG/M2 | SYSTOLIC BLOOD PRESSURE: 117 MMHG | OXYGEN SATURATION: 97 % | WEIGHT: 233 LBS | HEIGHT: 62 IN | HEART RATE: 100 BPM | DIASTOLIC BLOOD PRESSURE: 72 MMHG | RESPIRATION RATE: 16 BRPM | TEMPERATURE: 97.7 F

## 2023-11-06 DIAGNOSIS — R42 DIZZINESS AND GIDDINESS: ICD-10-CM

## 2023-11-06 DIAGNOSIS — I49.1 ATRIAL PREMATURE DEPOLARIZATION: ICD-10-CM

## 2023-11-06 DIAGNOSIS — R00.2 PALPITATIONS: ICD-10-CM

## 2023-11-06 PROCEDURE — 99214 OFFICE O/P EST MOD 30 MIN: CPT

## 2023-11-10 ENCOUNTER — RX RENEWAL (OUTPATIENT)
Age: 43
End: 2023-11-10

## 2023-12-14 ENCOUNTER — RX RENEWAL (OUTPATIENT)
Age: 43
End: 2023-12-14

## 2023-12-14 RX ORDER — INSULIN ASPART 100 [IU]/ML
100 INJECTION, SOLUTION INTRAVENOUS; SUBCUTANEOUS
Qty: 90 | Refills: 3 | Status: ACTIVE | COMMUNITY
Start: 2022-04-01 | End: 1900-01-01

## 2023-12-21 ENCOUNTER — APPOINTMENT (OUTPATIENT)
Dept: ENDOCRINOLOGY | Facility: CLINIC | Age: 43
End: 2023-12-21
Payer: COMMERCIAL

## 2023-12-21 VITALS
DIASTOLIC BLOOD PRESSURE: 80 MMHG | BODY MASS INDEX: 43.43 KG/M2 | SYSTOLIC BLOOD PRESSURE: 120 MMHG | HEIGHT: 62 IN | OXYGEN SATURATION: 96 % | HEART RATE: 112 BPM | WEIGHT: 236 LBS

## 2023-12-21 DIAGNOSIS — Z96.41 PRESENCE OF INSULIN PUMP (EXTERNAL) (INTERNAL): ICD-10-CM

## 2023-12-21 DIAGNOSIS — E10.9 TYPE 1 DIABETES MELLITUS W/OUT COMPLICATIONS: ICD-10-CM

## 2023-12-21 DIAGNOSIS — Z46.81 ENCOUNTER FOR FITTING AND ADJUSTMENT OF INSULIN PUMP: ICD-10-CM

## 2023-12-21 DIAGNOSIS — E55.9 VITAMIN D DEFICIENCY, UNSPECIFIED: ICD-10-CM

## 2023-12-21 LAB — HBA1C MFR BLD HPLC: 6.6

## 2023-12-21 PROCEDURE — 99214 OFFICE O/P EST MOD 30 MIN: CPT | Mod: 25

## 2023-12-21 PROCEDURE — 95251 CONT GLUC MNTR ANALYSIS I&R: CPT

## 2023-12-21 NOTE — PHYSICAL EXAM
[TextEntry] : Constitutional: alert and no acute distress. Well developed Eyes: no proptosis and no lid lag  Neck: no neck mass was observed   Pulmonary: no respiratory distress, no accessory muscle use  Cardiac: nontachycardic, no peripheral edema.  Vascular: no peripheral edema and there were no varicosital changes  Abdomen: nondistended Back: no spinal tenderness, no scoliosis Endocrine: no stigmata of Cushings Syndrome  Musculoskeletal: normal gait and no clubbing or cyanosis of the fingernails  Neurology:   no tremors  Psychiatric: oriented to person, place, and time and insight and judgment were intact  Skin: no rash, no acne

## 2023-12-21 NOTE — REVIEW OF SYSTEMS
[TextEntry] : Constitutional: no fatigue, appetite not decreased, no fever and no chills.  Eyes: no pain and no blurred vision.  Cardiovascular: no chest pain and no palpitations   Respiratory: no shortness of breath and no cough.  Gastrointestinal: no nausea, no constipation, no vomiting and no diarrhea.  Genitourinary: no dysuria and no nocturia.  Musculoskeletal: no muscle weakness and no myalgia.  Neurological: no headaches, no dizziness and no tremors.  Endocrine: no polydipsia, no cold intolerance and no heat intolerance.  Hematologic/Lymphatic: no tendency for easy bleeding and no tendency for easy bruising.

## 2023-12-21 NOTE — ASSESSMENT
[FreeTextEntry1] : Patient is a 43 F with T1D, asthma, GERD, HTN, HLD, h/o RA here for follow up for T1D  1. T1D - HgB A1C: 6.4--> 6.6 today likely due to her stopped the pump use, restarted in oct 2023 and currently without DEXCOM - Complications: none - Aspirin: no - Most recent urine microalbumin negative  06/2023. ACE-I/ARB: olmesartan 40 mg daily - Most recent LDL: 70 09/2023. On statin: yes - Opthalmology up to date: no, advised for yearly check up - Podiatry up to date: no, advised for yearly check up - Patient to call for persistent glucose < 70 or > 300 - Patient counseled on the importance of consistent carbohydrate diet and regular physical activity - Advised patient to continue checking FSG and to bring meter to all visits - Symptoms of hypoglycemia discussed, discussed treatment with 4 ounces of juice and 3-4 glucose tablets. Glucagon prescribed.   - Pump setting changed as below: basal rate  0000 1.5  ICR 1:8 ISF 1:30 0600 1.6 ICR 1:8 ISF 1:30 2200 1.5  ICR 1:8 ISF 1:30 - Advised her to do temporary basal of 80% reduction when she is more active for the duration of the activity   2. HTN - /80 - On amlodipine and omlesartan and HCTZ 40-25 mg daily  3. HLD - LDL 70 09/2023 at goal - Continue with rosuvastatin 10 mg daily  4. Vitamin D deficiency - Vitamin D level 31.5 08/2022 - Continue with vitamin D supplement  5. Obesity - BMI 39 - Discussed eating regular meals to avoid over snacking at bed time - Encourge exercise - Patient inquiring about starting GLP1RA. We discussed that GLP1RA is not FDA approved for use in patient's T1D because studies involving GLP1RA did not include T1D diabetes population. But it is approvated for weight loss purpose. We discussed potential side effects including GI upset, patient would like to proceed  and do trial of GLP1RA as she feel that she is doing the best she can in terms of lifestyle modificaion  - Prescription sent for either ozempic 0.25 weekly or mounjaro 2.5 mg weekly  - Discussed about the potential of hypoglycemia with adding on GLP1RA and patient will call and let me know  FOLLOW UP: I recommend that next visit for diabetes care be in 4 month  To do at next clinic visit - Monitor CGM - Weight loss  Jayleen Harris MD Endocrinology, Diabetes and Metabolism [address] [place] [address]. [address] [place] [address] Tel [phone] Fax [phone]

## 2023-12-21 NOTE — HISTORY OF PRESENT ILLNESS
[FreeTextEntry1] : Patient is a 43 F with history of asthma, T1DM, GERD here for follow up. Last visit with me 06/2023  FH: aunt has T2D SH: denies smoking or alcohol use Menstrual history: regular, has IUD placed in 2016, due to removal in 2023   # T1DM  Diabetes history: at age 23-24. Went for her physical, had high glucose in in the urine, diagnosed with T2D initially. Got pregnant one year later and was switched from Metformin to Insulin. After pregnancy, she was told that she is insulin dependent. Have been on insulin since 2007.   Most recent A1C 5.8 08/25/2022--> 6.4 04/18/2023-->   Diabetes complications: Neuropathy: no Retinopathy: no retinopathy (last eye exam 2019) Nephropathy: negative ACR in 06/2023  (most recent Cr 1.03, GFR 63) CAD/MI: none DKA: twice, most recent 12/2012, had flu was severely dehydrated  Current DM medications:  Started with C4Mtronic pump since 11. Started on Tandem with Control IQ 04/2016 use [employer] XC change it every 3 days. Also rotate in the abdomen.   Diabetes Therapy Regimen  Pump Model: Tandem  Basal Rate 1.6   Carb Ratios 8  Sensitvity 1:30     BG Target Ranges  110  Past DM medications:  Used to be on metformin before pregnancy    Finger sticks:  Sensor worn: dexcom G7 wear it on the arm usually, but currently does not have it due to delay in shipment, will be getting it in Feb CGM interpretation for dates: 11/14/2023-11/27/2023 TIR75 %  High 6% Very high0  % Low 13% Very low 6% SD 41 GMI% Average glucose:109 Sensor usage:30% Pattern: having overnight hypoglycemia from 12-6 am, wakes up usually at 6 am. Also has hypoglycemia during the middle of the day on the days that she is more active.   Diet:  Breakfast: egg white, sometimes skips, improving  Lunch:  sometimes skips lunch, chickens nuggets, salad Dinner: rice, chicken stew, pasta 7-8:30 Snacks: doritos, potato chips, chips, pineapple  Drinks: water, diet soda, water down juice Exercise: walk in the morning   # HTN - /80 - On amlodipine and omlesartan and HCTZ 40-25 mg daily  # HLD On atorvastatin  last LDL 60 71 04/18/2023, LDL 70 09/18/2023  TSH 2.21  # Vitamin D deficiency  last vitamin D level 23.5 10/21/2021 No longer on supplement.   8/25 visit: 8/24 had date snacks at bedtime, did not bolus enough.  Had hypoglycemia to 7-8 pm, was running around a lot yesterday. Had symptoms of dizziness, tired. Has glucose tablet and glucagon at home. Most of the time gives bolus and eats at the same time.   12/15 visit: had flu after thanksgiving. 12/14 had ham sandwich. Daughter's concert on Tuesday so she ate late, so she was hyperglycemic going to bed. Trying to bolus at least 10 minutes before she eats.   6/22/2023 visit: switched insurance because her insurance has high deductible for the insulin pump which she cannot afford. She4 is currently on MDI. Noticing that sometimes fasting glucose in the 200s usually happens with a later dinner with fatty contents. Went to urgent care on Monday and was on prednisone for 1 day for asthma excacerbation.   12/21/2023 visit: was sick in june and july and was on prednisone and had some hyperglycemia now resolved. No issue with the pump, infusion site changes 3 days. Uses DEXCOM, changes it every 10 days.

## 2023-12-22 RX ORDER — TIRZEPATIDE 2.5 MG/.5ML
2.5 INJECTION, SOLUTION SUBCUTANEOUS
Qty: 3 | Refills: 3 | Status: ACTIVE | COMMUNITY
Start: 2023-12-21

## 2024-01-17 ENCOUNTER — RX RENEWAL (OUTPATIENT)
Age: 44
End: 2024-01-17

## 2024-01-17 RX ORDER — ROSUVASTATIN CALCIUM 10 MG/1
10 TABLET, FILM COATED ORAL
Qty: 90 | Refills: 1 | Status: ACTIVE | COMMUNITY
Start: 2020-02-12 | End: 1900-01-01

## 2024-01-17 RX ORDER — OMEPRAZOLE 40 MG/1
40 CAPSULE, DELAYED RELEASE ORAL
Qty: 90 | Refills: 1 | Status: ACTIVE | COMMUNITY
Start: 2019-11-25 | End: 1900-01-01

## 2024-02-23 ENCOUNTER — RX RENEWAL (OUTPATIENT)
Age: 44
End: 2024-02-23

## 2024-02-23 RX ORDER — AMLODIPINE BESYLATE 5 MG/1
5 TABLET ORAL DAILY
Qty: 90 | Refills: 1 | Status: ACTIVE | COMMUNITY
Start: 2020-01-08 | End: 1900-01-01

## 2024-03-15 ENCOUNTER — APPOINTMENT (OUTPATIENT)
Dept: PULMONOLOGY | Facility: CLINIC | Age: 44
End: 2024-03-15
Payer: COMMERCIAL

## 2024-03-15 VITALS
HEIGHT: 62 IN | WEIGHT: 217 LBS | SYSTOLIC BLOOD PRESSURE: 128 MMHG | RESPIRATION RATE: 16 BRPM | TEMPERATURE: 96.8 F | OXYGEN SATURATION: 98 % | HEART RATE: 112 BPM | DIASTOLIC BLOOD PRESSURE: 82 MMHG | BODY MASS INDEX: 39.93 KG/M2

## 2024-03-15 DIAGNOSIS — K21.9 GASTRO-ESOPHAGEAL REFLUX DISEASE W/OUT ESOPHAGITIS: ICD-10-CM

## 2024-03-15 DIAGNOSIS — J45.909 UNSPECIFIED ASTHMA, UNCOMPLICATED: ICD-10-CM

## 2024-03-15 DIAGNOSIS — R06.02 SHORTNESS OF BREATH: ICD-10-CM

## 2024-03-15 DIAGNOSIS — G47.33 OBSTRUCTIVE SLEEP APNEA (ADULT) (PEDIATRIC): ICD-10-CM

## 2024-03-15 DIAGNOSIS — J30.9 ALLERGIC RHINITIS, UNSPECIFIED: ICD-10-CM

## 2024-03-15 DIAGNOSIS — E66.9 OBESITY, UNSPECIFIED: ICD-10-CM

## 2024-03-15 PROCEDURE — 95012 NITRIC OXIDE EXP GAS DETER: CPT

## 2024-03-15 PROCEDURE — 94727 GAS DIL/WSHOT DETER LNG VOL: CPT

## 2024-03-15 PROCEDURE — 94010 BREATHING CAPACITY TEST: CPT

## 2024-03-15 PROCEDURE — 94729 DIFFUSING CAPACITY: CPT

## 2024-03-15 PROCEDURE — 99214 OFFICE O/P EST MOD 30 MIN: CPT | Mod: 25

## 2024-03-15 PROCEDURE — ZZZZZ: CPT

## 2024-03-15 NOTE — ASSESSMENT
[FreeTextEntry1] : Ms. Champagne is a 43 year old female with a history of DM, HLD, heart murmur, OSAS, HTN, asthma, allergies, GERD, anemia, rheumatoid arthritis, presenting to the office for a follow up visit. - improved; vaccinated x 2 Covid 19- s/p influenza sinus issues - #1 issue is dizzyness with position change, #2 Arthritis  The patient's shortness of breath is multifactorial due to: -pulmonary disease       -asthma -poor breathing mechanics  -overweight/out of shape -?cardiac disease   problem 1: asthma- contorlled  -continue Symbicort (160) 2 inhalations BID -continue Incruse 1 inhalation daily -continue to use Singulair 10 mg before bed -continue Ventolin rescue inhaler 2 inhalations before exercise, Q6H  -recommended to cover her nose and mouth in the cold air  -Asthma is  believed to be caused by inherited (genetic) and environmental factor, but its exact cause is unknown. Asthma may be triggered by allergens, lung infections, or irritants in the air. Asthma triggers are different for each person -Inhaler technique reviewed as well as oral hygiene techniques reviewed with patient. Avoidance of cold air, extremes of temperature, rescue inhaler should be used before exercise. Order of medication reviewed with patient. Recommended use of a cool mist humidifier in the bedroom.   problem 2: allergies and sinuses-active  (s/p influenza) -continue Qnasl 1 sniff BID (PA) -continue Olopatadine 0.6% at 1 sniff/nostril BID  -add Olopatadine 0.2% eye drops BD -continue to use OTC Claritin QAM -continue to use nasal saline -continue to use Xyzal 5 mg before bed  -Environmental measures for allergies were encouraged including mattress and pillow cover, air purifier, and environmental controls.   Problem 2A: R/o immune deficiency -s/p blood work: strep pneumonia titers (low), IgG levels (-), quantitative immunoglobulins (-) -Patient received Pneumovax in 7/2020, Prevnar-13 12/2020 -Due to the fact that this pt has had more infections than would be expected and immunological blood work is indicated this would include: IgG subclasses, quantitative immunoglobulins, Strep pneumoniae titers as well as Vitamin D levels. Based on this blood work we will be able to decide where the pt needs additional pneumococcal vaccine either Prevnar 13 or pneumovax. Immunology evaluation will also be potentially indicated.  problem 3: GERD  -continue Omeprazole 40 mg before breakfast -Rule of 2s: avoid eating too much, eating too late, eating too spicy, eating two hours before bed -Things to avoid including overeating, spicy foods, tight clothing, eating within three hours of bed, this list is not all inclusive.  -For treatment of reflux, possible options discussed including diet control, H2 blockers, PPIs, as well as coating motility agents discussed as treatment options. Timing of meals and proximity of last meal to sleep were discussed. If symptoms persist, a formal gastrointestinal evaluation is needed.   Problem 4: anemia -continue to follow up with hematology, endocrinology  problem 5: overweight (Tomas / Tate) - (improved on Mounjaro) - Recommended "10-Day Detox" by Joseluis Bianchi for 2-3 weeks followed by probiotics  -recommended "MUNIQ" OTC supplement  -Weight loss, exercise, and diet control were discussed and are highly encouraged. Treatment options were given such as, aqua therapy, and contacting a nutritionist. Recommended to use the elliptical, stationary bike, less use of treadmill.  Obesity is associated with worsening asthma, shortness of breath, and potential for cardiac disease, diabetes, and other underlying medical conditions.   problem 6: mild sleep apnea -continue to use Oxy-Aid and Breathe Rite strips, Provent and Theravent, Somnifix -recommended to use an oral appliance (Ethel Fan) -based on her fatigue, EDS, snoring, and elevated mallampati class -Discussed the risks/associations with coronary artery disease, atrial fibrillation, arrhythmia, memory loss, issues with concentration, hypertension, nocturia, chronic reflux/Patrick's esophagus some but not all inclusive. Treatment options discussed including CPAP/BiPAP machine, oral appliance, ProVent therapy, Oxy-Aid by [employer]pite[employer], new technologies, or positional sleep.  problem 7: hand pain (quiet) -recommended SPM and quercetin -s/p Carpal tunnel (resolved)  -recommended to have an evaluation with Shaquille Aguilar for hand splints or to have an evaluation for a orthotist  -she can also try OTC wrist splints   Problem 8: Cardiac (Dizziness) -Continue to follow up with Dr. Weinberg.  Problem 9: health maintenance -recommended Berberine / Quercetin / SPM -Recommended [employer]  -recommended Philipp Sloan's foundation stretches  -s/p Pfizer COVID 19 vaccine x 4 -s/p influenza vaccine 2022 -recommended strep pneumonia vaccines after age 65: Prevnar-13 vaccine, followed by Pneumo vaccine 23 one year following -recommended early intervention for URIs -recommended regular osteoporosis evaluations -recommended early dermatological evaluations -recommended after the age of 50 to the age of 70, colonoscopy every 5 years   F/U in 4 months She is encouraged to call with any changes, concerns or questions.

## 2024-03-15 NOTE — HISTORY OF PRESENT ILLNESS
[FreeTextEntry1] : Ms. Champagne is a 43 year old female presenting to the office today for a follow up visit for allergies, asthma, GERD, and poor sleep. Her chief complaint is weight  -She notes she feels lightheaded/dizzy spells recently for the past 6 months -She notes adjusting her blood pressure medications recently and noted improvement -She notes when she squats down, she notices more dizzy spells which have been more consistently -She notices SOB when climbing stairs -She notes her vision gets blurred when she undergoes dizzy spells -She notes back pain consistent with herniated disks - she notes good sleep -She notes she sometimes does not get a full8 hours of sleep -She notes walking for exercise -She notes noting she needs to drink more water -She notes her biggest complaints are her arthritis and dizzy spells -She notes she lost weight recently -She notes being Mounjaro   -she denies any headaches, nausea, emesis, fever, chills, sweats, chest pain, chest pressure, coughing, wheezing, palpitations, constipation, diarrhea, dysphagia, heartburn, reflux, itchy eyes, itchy ears, leg swelling, leg pain, arthralgias, myalgias, hoarseness, or sour taste in the mouth.

## 2024-03-15 NOTE — PROCEDURE
[FreeTextEntry1] : Full PFT reveals normal flows; FEV1 was 2.07 L which is 80% of predicted; normal lung volumes; mild diffusion at 15.8, which is 59% of predicted; normal flow volume loop. PFTs were performed to evaluate for SOB.  FENO was 52; a normal value being less than 25 Fractional exhaled nitric oxide (FENO) is regarded as a simple, noninvasive method for assessing eosinophilic airway inflammation. Produced by a variety of cells within the lung, nitric oxide (NO) concentrations are generally low in healthy individuals. However, high concentrations of NO appear to be involved in nonspecific host defense mechanisms and chronic inflammatory diseases such as asthma. The American Thoracic Society (ATS) therefore has strongly recommended using FENO to aid in the assessment, management, and long-term monitoring of eosinophilic airway inflammation and asthma, and for identifying steroid responsive individuals whose chronic respiratory symptoms may be caused by airway inflammation. In their 2011 clinical practice guideline, the ATS emphasizes the importance of using FENO.

## 2024-03-15 NOTE — PHYSICAL EXAM
[III] : Mallampati Class: III [Normal Oropharynx] : normal oropharynx [No Acute Distress] : no acute distress [No Neck Mass] : no neck mass [Normal Appearance] : normal appearance [Normal Rate/Rhythm] : normal rate/rhythm [Normal S1, S2] : normal s1, s2 [No Murmurs] : no murmurs [No Resp Distress] : no resp distress [Clear to Auscultation Bilaterally] : clear to auscultation bilaterally [No Abnormalities] : no abnormalities [Normal Gait] : normal gait [Benign] : benign [No Edema] : no edema [No Cyanosis] : no cyanosis [No Clubbing] : no clubbing [Normal Color/ Pigmentation] : normal color/ pigmentation [FROM] : FROM [No Focal Deficits] : no focal deficits [Oriented x3] : oriented x3 [Normal Affect] : normal affect [TextBox_2] : Overweight [TextBox_68] : I:E ratio 1:3; clear

## 2024-03-15 NOTE — ADDENDUM
[FreeTextEntry1] : Documented by Javier Maradiaga acting as a scribe for Dr. Herber Day on 03/15/2024.   All medical record entries made by the Scribe were at my, Dr. Herber Day's, direction and personally dictated by me on 03/15/2024. I have reviewed the chart and agree that the record accurately reflects my personal performance of the history, physical exam, assessment and plan. I have also personally directed, reviewed, and agree with the discharge instructions.

## 2024-05-01 ENCOUNTER — NON-APPOINTMENT (OUTPATIENT)
Age: 44
End: 2024-05-01

## 2024-05-06 ENCOUNTER — APPOINTMENT (OUTPATIENT)
Dept: CARDIOLOGY | Facility: CLINIC | Age: 44
End: 2024-05-06
Payer: COMMERCIAL

## 2024-05-06 ENCOUNTER — RX RENEWAL (OUTPATIENT)
Age: 44
End: 2024-05-06

## 2024-05-06 VITALS
RESPIRATION RATE: 16 BRPM | BODY MASS INDEX: 39.64 KG/M2 | HEART RATE: 108 BPM | OXYGEN SATURATION: 98 % | TEMPERATURE: 98 F | WEIGHT: 215.44 LBS | SYSTOLIC BLOOD PRESSURE: 127 MMHG | HEIGHT: 62 IN | DIASTOLIC BLOOD PRESSURE: 80 MMHG

## 2024-05-06 DIAGNOSIS — E78.5 HYPERLIPIDEMIA, UNSPECIFIED: ICD-10-CM

## 2024-05-06 DIAGNOSIS — R01.1 CARDIAC MURMUR, UNSPECIFIED: ICD-10-CM

## 2024-05-06 DIAGNOSIS — R06.09 OTHER FORMS OF DYSPNEA: ICD-10-CM

## 2024-05-06 DIAGNOSIS — I10 ESSENTIAL (PRIMARY) HYPERTENSION: ICD-10-CM

## 2024-05-06 PROCEDURE — 93015 CV STRESS TEST SUPVJ I&R: CPT

## 2024-05-06 PROCEDURE — G2211 COMPLEX E/M VISIT ADD ON: CPT | Mod: NC,1L

## 2024-05-06 PROCEDURE — 93306 TTE W/DOPPLER COMPLETE: CPT

## 2024-05-06 PROCEDURE — 99213 OFFICE O/P EST LOW 20 MIN: CPT | Mod: 25

## 2024-05-06 RX ORDER — MONTELUKAST 10 MG/1
10 TABLET, FILM COATED ORAL
Qty: 90 | Refills: 1 | Status: ACTIVE | COMMUNITY
Start: 2017-05-02 | End: 1900-01-01

## 2024-05-06 NOTE — REASON FOR VISIT
[CV Risk Factors and Non-Cardiac Disease] : CV risk factors and non-cardiac disease [Arrhythmia/ECG Abnorrmalities] : arrhythmia/ECG abnormalities [Hyperlipidemia] : hyperlipidemia [Follow-Up - Clinic] : a clinic follow-up of [Dyspnea] : dyspnea [Hypertension] : hypertension [Palpitations] : palpitations [FreeTextEntry3] : Dr. Ingrid Malin [FreeTextEntry1] : sinus tachycardia

## 2024-05-06 NOTE — DISCUSSION/SUMMARY
[FreeTextEntry1] : This is a 43-year-old female past medical history significant for insulin-dependent diabetes mellitus, status post COVID-19 infection in May 2022 (treated with monoclonal antibody infusion), hypertension, status post recent pneumonia (3) times, status post  section, status post nasal surgery, comes in for cardiac follow up evaluation.   She has no history of rheumatic fever.  Her cardiac risk factors Include hypertension, insulin-dependent diabetes mellitus. The patient had a normal exercise stress test May 6, 2024; and normal blood pressure response to exercise Lipid panel done 2023 demonstrated cholesterol 146, HDL of 60, triglycerides 82, LDL calculated 70, non-HDL cholesterol 86, LDL direct 70 mg/dL Electrocardiogram done 2023 demonstrated normal sinus rhythm rate 97 bpm is otherwise unremarkable. I have recommended the patient reduce her amlodipine to 5 mg after dinner.  She is currently taking 10 mg at bedtime. I feel that her blood pressure is under better control with Benicar hydrochlorothiazide 40/25 mg/day and her symptoms are likely related to volume depletion in the setting of good antihypertensive therapy. She admits that she does not drink enough water/fluids and will increase her water intake.  Lipid panel done 2023 demonstrated a cholesterol 136, HDL of 51, triglycerides 72, LDL direct 71, non-HDL cholesterol 86 mg/dL with hemoglobin A1c of 6.4. The patient had a normal exercise stress test May 24, 2022. The patient is maintaining a good diet, and trying to incorporate more exercise in her daily routine. Her blood pressure is under good control. Blood work done 2020 demonstrated a cholesterol of 149, triglycerides of 63, HDL 61, and LDL direct of 73 mg/dL.  Electrocardiogram done 2020 demonstrated normal sinus rhythm rate of 85 bpm is otherwise unremarkable.  The patient had a Holter monitor done 2020 which demonstrated no significant arrhythmia or heart block.  Patient had 3 isolated atrial premature contractions and one premature ventricular contraction.  Blood work done 2021 demonstrated cholesterol 136, HDL 54, LDL calculated 65, non-HDL cholesterol 81 mg/dL. Given the patient's diabetic state and increased LDL cholesterol, I have recommended she start on lipid-lowering therapy.  The patient is not breast-feeding and does not plan to get pregnant again. She clearly understands she not be on statin therapy (with Crestor), if she gets pregnant.  She understands she must contact me immediately if she is trying to get pregnant or gets pregnant accidentally and discontinue Crestor therapy.  The patient understands that aerobic exercises must be increased to 40 minutes 4 times per week. A detailed discussion of lifestyle modification was done today. The patient has a good understanding of the diagnosis, and treatment plan. Lifestyle modification was also outlined.

## 2024-05-06 NOTE — PHYSICAL EXAM
[Well Developed] : well developed [Well Nourished] : well nourished [No Acute Distress] : no acute distress [Normal Venous Pressure] : normal venous pressure [No Carotid Bruit] : no carotid bruit [Normal S1, S2] : normal S1, S2 [No Rub] : no rub [Normal] : normal [No Precordial Heave] : no precordial heave was noted [Rhythm Regular] : regular [No Gallop] : no gallop heard [II] : a grade 2 [Clear Lung Fields] : clear lung fields [Good Air Entry] : good air entry [No Respiratory Distress] : no respiratory distress  [Soft] : abdomen soft [Non Tender] : non-tender [No Masses/organomegaly] : no masses/organomegaly [Normal Bowel Sounds] : normal bowel sounds [Normal Gait] : normal gait [No Edema] : no edema [No Cyanosis] : no cyanosis [No Clubbing] : no clubbing [No Varicosities] : no varicosities [No Rash] : no rash [No Skin Lesions] : no skin lesions [Moves all extremities] : moves all extremities [No Focal Deficits] : no focal deficits [Normal Speech] : normal speech [Alert and Oriented] : alert and oriented [Normal memory] : normal memory [General Appearance - Well Developed] : well developed [Normal Appearance] : normal appearance [Well Groomed] : well groomed [General Appearance - Well Nourished] : well nourished [No Deformities] : no deformities [General Appearance - In No Acute Distress] : no acute distress [Normal Conjunctiva] : the conjunctiva exhibited no abnormalities [Normal Oral Mucosa] : normal oral mucosa [Normal Oropharynx] : normal oropharynx [JVD Elevated _____cm] : JVD elevated [unfilled] ~U cm above clavicle [Normal Jugular Venous V Waves Present] : normal jugular venous V waves present [Respiration, Rhythm And Depth] : normal respiratory rhythm and effort [Exaggerated Use Of Accessory Muscles For Inspiration] : no accessory muscle use [Auscultation Breath Sounds / Voice Sounds] : lungs were clear to auscultation bilaterally [Bowel Sounds] : normal bowel sounds [Abdomen Soft] : soft [Abnormal Walk] : normal gait [Gait - Sufficient For Exercise Testing] : the gait was sufficient for exercise testing [Nail Clubbing] : no clubbing of the fingernails [Cyanosis, Localized] : no localized cyanosis [Skin Color & Pigmentation] : normal skin color and pigmentation [Skin Turgor] : normal skin turgor [] : no rash [Oriented To Time, Place, And Person] : oriented to person, place, and time [Affect] : the affect was normal [Mood] : the mood was normal [No Anxiety] : not feeling anxious [5th Left ICS - MCL] : palpated at the 5th LICS in the midclavicular line [Normal Rate] : normal [Normal S1] : normal S1 [Normal S2] : normal S2 [No Murmur] : no murmurs heard [I] : a grade 1 [2+] : left 2+ [No Abnormalities] : the abdominal aorta was not enlarged and no bruit was heard [No Pitting Edema] : no pitting edema present [FreeTextEntry1] : carotid murmur vs bruit [S3] : no S3 [S4] : no S4 [Right Carotid Bruit] : no bruit heard over the right carotid [Left Carotid Bruit] : no bruit heard over the left carotid

## 2024-05-06 NOTE — ASSESSMENT
[FreeTextEntry1] : Prior note nurse practitioner Shan 2023::  This is a 43-year-old female here today for follow up cardiac evaluation. She has a past medical history significant for significant for insulin-dependent diabetes mellitus (type 1), s/p COVID-19 infection in May 2022 (treated with monoclonal antibody infusion), hypertension, s/p pneumonia, status post  section, s/p nasal surgery.  She has no history of rheumatic fever. Her cardiac risk factors Include hypertension, insulin-dependent diabetes mellitus.  HPI: Today she is feeling generally well today and denies chest pain, dizziness, heart palpitations, recent episodes of syncope or falls, SOB, or dyspnea at this time.   States that since decreasing her Amlodipine to 5 mg after dinner and increasing her fluid intake during the day she has had significant improvement in her dizziness without any episodes occurring. She is also looking to increase her activity level with more frequent workouts and is considering taking tea supplements with caffeine in them. Discussed avoiding excessive caffeine intake and aiming for ~200 mg of caffeine daily between her decaf coffee and tea.   She is currently prescribed Amlodipine 5 mg daily, Olmesartan-Hydrochlorothiazide 40-25 mg daily and Rosuvastatin 10 mg daily.   BLOOD PRESSURE: -Pressure is controlled on today's exam.  BLOOD WORK: -Lipid panel done 2023 demonstrating cholesterol 146, triglycerides 82, HDL 60, LDL 70, Non-HDL 86, LDL-direct 70  -New blood work was done 2023 demonstrating cholesterol 136, triglycerides 72, HDL 51, LDL 71, Non-HDL 86, LDL-direct 71   TESTING/REPORTS: -Electrocardiogram done 2023 demonstrated normal sinus rhythm rate 97 bpm is otherwise unremarkable.  -EKG done 2023 which demonstrated regular sinus rhythm with nonspecific ST-T wave changes BPM of 100.  -EKG done Oct 17, 2022 which demonstrated regular sinus tachycardia rhythm with nonspecific ST-T wave changes, BPM of 99. Her baseline rhythm is tachycardia.  -Echocardiogram done May 24, 2022 demonstrated minimal to mild mitral, tricuspid, pulmonic regurgitation with normal left ventricular systolic function ejection fraction of 66%.  -The patient had a normal exercise stress test May 24, 2022.  -Electrocardiogram done 2020 demonstrated normal sinus rhythm rate of 85 bpm is otherwise unremarkable.  -The patient had a Holter monitor done 2020 which demonstrated no significant arrhythmia or heart block. Patient had 3 isolated atrial premature contractions and one premature ventricular contraction.  PLAN: -The patient is clinically stable from a cardiac standpoint on today's exam. -She will continue with her current medications and will contact the office if she is having any complaints between now and their next follow up appointment. -Patient will schedule echocardiogram doppler examination to evaluate murmur, left ventricular function, chamber size, and rule out hypertrophy. -Patient will schedule exercise stress test to rule out significant coronary artery disease.   I have discussed the plan of care with Ms. DAVID JOHNSON and she will follow up in 3-6 months. She is compliant with all of her medications.  The patient understands that aerobic exercises must be increased to minutes 4 times/week and a detailed discussion of lifestyle modification was done today. The patient has a good understanding of the diagnosis, treatment plan and lifestyle modification. She will contact me at the office for any questions with their care or any changes in their health status.  Dr Weinberg, supervising physician, was present in the office with MATHEW Torrez NP during the mar portions of the history and exam. The plan was discussed in detail as documented in the note.  MATHEW Torrez NP

## 2024-05-09 RX ORDER — OLOPATADINE HYDROCHLORIDE 2 MG/ML
0.2 SOLUTION OPHTHALMIC
Qty: 3 | Refills: 1 | Status: DISCONTINUED | COMMUNITY
Start: 2023-09-06 | End: 2024-05-09

## 2024-05-09 RX ORDER — INSULIN GLARGINE 100 [IU]/ML
100 INJECTION, SOLUTION SUBCUTANEOUS
Qty: 5 | Refills: 3 | Status: DISCONTINUED | COMMUNITY
Start: 2023-04-28 | End: 2024-05-09

## 2024-05-09 RX ORDER — SEMAGLUTIDE 0.68 MG/ML
2 INJECTION, SOLUTION SUBCUTANEOUS
Qty: 5 | Refills: 3 | Status: DISCONTINUED | COMMUNITY
Start: 2023-12-21 | End: 2024-05-09

## 2024-05-21 ENCOUNTER — RX RENEWAL (OUTPATIENT)
Age: 44
End: 2024-05-21

## 2024-07-03 ENCOUNTER — RX RENEWAL (OUTPATIENT)
Age: 44
End: 2024-07-03

## 2024-07-05 ENCOUNTER — NON-APPOINTMENT (OUTPATIENT)
Age: 44
End: 2024-07-05

## 2024-07-13 ENCOUNTER — RX RENEWAL (OUTPATIENT)
Age: 44
End: 2024-07-13

## 2024-07-15 ENCOUNTER — RX RENEWAL (OUTPATIENT)
Age: 44
End: 2024-07-15

## 2024-08-22 ENCOUNTER — APPOINTMENT (OUTPATIENT)
Dept: ENDOCRINOLOGY | Facility: CLINIC | Age: 44
End: 2024-08-22
Payer: COMMERCIAL

## 2024-08-22 VITALS
BODY MASS INDEX: 38.64 KG/M2 | WEIGHT: 210 LBS | HEART RATE: 108 BPM | SYSTOLIC BLOOD PRESSURE: 144 MMHG | DIASTOLIC BLOOD PRESSURE: 98 MMHG | HEIGHT: 62 IN | OXYGEN SATURATION: 99 %

## 2024-08-22 DIAGNOSIS — Z96.41 PRESENCE OF INSULIN PUMP (EXTERNAL) (INTERNAL): ICD-10-CM

## 2024-08-22 DIAGNOSIS — E78.5 HYPERLIPIDEMIA, UNSPECIFIED: ICD-10-CM

## 2024-08-22 DIAGNOSIS — Z46.81 ENCOUNTER FOR FITTING AND ADJUSTMENT OF INSULIN PUMP: ICD-10-CM

## 2024-08-22 DIAGNOSIS — E10.9 TYPE 1 DIABETES MELLITUS W/OUT COMPLICATIONS: ICD-10-CM

## 2024-08-22 DIAGNOSIS — E55.9 VITAMIN D DEFICIENCY, UNSPECIFIED: ICD-10-CM

## 2024-08-22 LAB — HBA1C MFR BLD HPLC: 6.1

## 2024-08-22 PROCEDURE — 99215 OFFICE O/P EST HI 40 MIN: CPT

## 2024-08-22 PROCEDURE — 95251 CONT GLUC MNTR ANALYSIS I&R: CPT

## 2024-08-22 PROCEDURE — 83036 HEMOGLOBIN GLYCOSYLATED A1C: CPT | Mod: QW

## 2024-08-22 RX ORDER — INSULIN GLARGINE 100 [IU]/ML
100 INJECTION, SOLUTION SUBCUTANEOUS
Qty: 3 | Refills: 3 | Status: ACTIVE | COMMUNITY
Start: 2024-08-22 | End: 1900-01-01

## 2024-08-22 NOTE — HISTORY OF PRESENT ILLNESS
[FreeTextEntry1] : Patient is a 43 F with history of asthma, T1DM, GERD here for follow up. Last visit with me 12/2023  FH: aunt has T2D SH: denies smoking or alcohol use Menstrual history: regular, has IUD placed in 2016, due to removal in 2023   # T1DM  Diabetes history: at age 23-24. Went for her physical, had high glucose in in the urine, diagnosed with T2D initially. Got pregnant one year later and was switched from Metformin to Insulin. After pregnancy, she was told that she is insulin dependent. Have been on insulin since 2007.   Most recent A1C 5.8 08/25/2022--> 6.4 04/18/2023--> 6.6 12/2023--> 6.1 08/2024  Diabetes complications: Neuropathy: no Retinopathy: no retinopathy (last eye exam 2019) Nephropathy: negative ACR in 06/2023  (most recent Cr 1.03, GFR 63) CAD/MI: none DKA: twice, most recent 12/2012, had flu was severely dehydrated  Current DM medications:  Started with aloomatronic pump since 10. Started on Tandem with Control IQ 04/2016 use Ventive XC change it every 3 days. Also rotate in the abdomen.   Diabetes Therapy Regimen  Pump Model: Tandem  Basal Rate  0000 1.5 0600 1.6 1600 1.6 2200 1.5 total 37.6  Carb Ratio  0000 8 0600 8 1600 7 2200 7  Sensitvity 1:30    BG Target Ranges  120  Past DM medications:  Used to be on metformin before pregnancy    Finger sticks:  Sensor worn: dexcom G6 wear it on the arm usually [id number] CGM interpretation for dates: 07/10-07/23/2023 TIR 83 %  High 9 % Very high 4% Low 4% Very low <1% SD 50 GMI 6.4% Average glucose:131 Sensor usage: 93% Pattern: No DEXCOM transmitted for the past month. When she was on vacation, she had some erratic sugar. But no pattern  Mounjaro 2.5 mg weekly   Diet:  Breakfast: egg white, sometimes skips, improving  Lunch:  sometimes skips lunch, chickens nuggets, salad Dinner: rice, chicken stew, pasta 7-8:30 Snacks: doritos, potato chips, chips, pineapple  Drinks: water, diet soda, water down juice Exercise: walk in the morning   # HTN - /98 - On amlodipine and omlesartan and HCTZ 40-25 mg daily  # HLD On atorvastatin  last LDL 60 71 04/18/2023, LDL 70 09/18/2023  TSH 2.21  # Vitamin D deficiency  last vitamin D level 23.5 10/21/2021 No longer on supplement.   8/25 visit: 8/24 had date snacks at bedtime, did not bolus enough.  Had hypoglycemia to 7-8 pm, was running around a lot yesterday. Had symptoms of dizziness, tired. Has glucose tablet and glucagon at home. Most of the time gives bolus and eats at the same time.   12/15 visit: had flu after thanksgiving. 12/14 had ham sandwich. Daughter's concert on Tuesday so she ate late, so she was hyperglycemic going to bed. Trying to bolus at least 10 minutes before she eats.   6/22/2023 visit: switched insurance because her insurance has high deductible for the insulin pump which she cannot afford. She4 is currently on MDI. Noticing that sometimes fasting glucose in the 200s usually happens with a later dinner with fatty contents. Went to urgent care on Monday and was on prednisone for 1 day for asthma excacerbation.   12/21/2023 visit: was sick in june and july and was on prednisone and had some hyperglycemia now resolved. No issue with the pump, infusion site changes 3 days. Uses DEXCOM, changes it every 10 days.   8/22/2024 visit events: notes that mounjaro helps with weight and sugar. Lost her DEXCOM transmitter during vacation, so no DEXCOM for the past months. More active. Energy feels better

## 2024-08-22 NOTE — ASSESSMENT
[FreeTextEntry1] : Patient is a 44 F with T1D, asthma, GERD, HTN, HLD, h/o RA here for follow up for T1D.   1. T1D - HgB A1C: 6.4--> 6.6 today likely due to her stopped the pump use, restarted in oct 2023, 6.1 08/22/2024 - Complications: none - Aspirin: no - Most recent urine microalbumin negative  06/2023. ACE-I/ARB: olmesartan 40 mg daily - Most recent LDL: 70 09/2023. On statin: yes - Opthalmology up to date: no, advised for yearly check up - Podiatry up to date: no, advised for yearly check up - Patient to call for persistent glucose < 70 or > 300 - Patient counseled on the importance of consistent carbohydrate diet and regular physical activity - Advised patient to continue checking FSG and to bring meter to all visits - Symptoms of hypoglycemia discussed, discussed treatment with 4 ounces of juice and 3-4 glucose tablets. Glucagon prescribed.   - Pump setting no change, no distinct pattern   - Pump all of sudden malfunctioned likely due to out of warranty, CDE working on getting pump to work and talking to tandem. If unable to get pump to work, will need to do basal/bolus  - She will do blood work at PCP office   2. HTN - /98 - On amlodipine and omlesartan and HCTZ 40-25 mg daily  3. HLD - LDL 70 09/2023 at goal, repeat lipid profile wtih PCP  - Continue with rosuvastatin 10 mg daily  4. Vitamin D deficiency - Vitamin D level 31.5 08/2022, repeat vitamin D with PCP  - Continue with vitamin D supplement  5. Obesity - BMI 39 - Discussed eating regular meals to avoid over snacking at bed time - Encourge exercise - Patient inquiring about starting GLP1RA. Started on mounjaro 2.5 mg weekly  - Currently doing well, patient is having slow and steady weight loss. Continue with mounjaro 2.5 mg weekly for now  FOLLOW UP: I recommend that next visit for diabetes care be in 6 months with acp  To do at next clinic visit - Monitor CGM - Weight loss  Jayleen Harris MD Endocrinology, Diabetes and Metabolism [address] [place] [address]. [address] [place] [address] Tel [phone] Fax [phone]

## 2024-08-22 NOTE — HISTORY OF PRESENT ILLNESS
[FreeTextEntry1] : Patient is a 43 F with history of asthma, T1DM, GERD here for follow up. Last visit with me 12/2023  FH: aunt has T2D SH: denies smoking or alcohol use Menstrual history: regular, has IUD placed in 2016, due to removal in 2023   # T1DM  Diabetes history: at age 23-24. Went for her physical, had high glucose in in the urine, diagnosed with T2D initially. Got pregnant one year later and was switched from Metformin to Insulin. After pregnancy, she was told that she is insulin dependent. Have been on insulin since 2007.   Most recent A1C 5.8 08/25/2022--> 6.4 04/18/2023--> 6.6 12/2023--> 6.1 08/2024  Diabetes complications: Neuropathy: no Retinopathy: no retinopathy (last eye exam 2019) Nephropathy: negative ACR in 06/2023  (most recent Cr 1.03, GFR 63) CAD/MI: none DKA: twice, most recent 12/2012, had flu was severely dehydrated  Current DM medications:  Started with Synapse [employer]icaltronic pump since 10. Started on Tandem with Control IQ 04/2016 use [employer] XC change it every 3 days. Also rotate in the abdomen.   Diabetes Therapy Regimen  Pump Model: Tandem  Basal Rate  0000 1.5 0600 1.6 1600 1.6 2200 1.5 total 37.6  Carb Ratio  0000 8 0600 8 1600 7 2200 7  Sensitvity 1:30    BG Target Ranges  120  Past DM medications:  Used to be on metformin before pregnancy    Finger sticks:  Sensor worn: dexcom G6 wear it on the arm usually [id number] CGM interpretation for dates: 07/10-07/23/2023 TIR 83 %  High 9 % Very high 4% Low 4% Very low <1% SD 50 GMI 6.4% Average glucose:131 Sensor usage: 93% Pattern: No DEXCOM transmitted for the past month. When she was on vacation, she had some erratic sugar. But no pattern  Mounjaro 2.5 mg weekly   Diet:  Breakfast: egg white, sometimes skips, improving  Lunch:  sometimes skips lunch, chickens nuggets, salad Dinner: rice, chicken stew, pasta 7-8:30 Snacks: doritos, potato chips, chips, pineapple  Drinks: water, diet soda, water down juice Exercise: walk in the morning   # HTN - /98 - On amlodipine and omlesartan and HCTZ 40-25 mg daily  # HLD On atorvastatin  last LDL 60 71 04/18/2023, LDL 70 09/18/2023  TSH 2.21  # Vitamin D deficiency  last vitamin D level 23.5 10/21/2021 No longer on supplement.   8/25 visit: 8/24 had date snacks at bedtime, did not bolus enough.  Had hypoglycemia to 7-8 pm, was running around a lot yesterday. Had symptoms of dizziness, tired. Has glucose tablet and glucagon at home. Most of the time gives bolus and eats at the same time.   12/15 visit: had flu after thanksgiving. 12/14 had ham sandwich. Daughter's concert on Tuesday so she ate late, so she was hyperglycemic going to bed. Trying to bolus at least 10 minutes before she eats.   6/22/2023 visit: switched insurance because her insurance has high deductible for the insulin pump which she cannot afford. She4 is currently on MDI. Noticing that sometimes fasting glucose in the 200s usually happens with a later dinner with fatty contents. Went to urgent care on Monday and was on prednisone for 1 day for asthma excacerbation.   12/21/2023 visit: was sick in june and july and was on prednisone and had some hyperglycemia now resolved. No issue with the pump, infusion site changes 3 days. Uses DEXCOM, changes it every 10 days.   8/22/2024 visit events: notes that mounjaro helps with weight and sugar. Lost her DEXCOM transmitter during vacation, so no DEXCOM for the past months. More active. Energy feels better

## 2024-08-22 NOTE — ASSESSMENT
[FreeTextEntry1] : Patient is a 44 F with T1D, asthma, GERD, HTN, HLD, h/o RA here for follow up for T1D.   1. T1D - HgB A1C: 6.4--> 6.6 today likely due to her stopped the pump use, restarted in oct 2023, 6.1 08/22/2024 - Complications: none - Aspirin: no - Most recent urine microalbumin negative  06/2023. ACE-I/ARB: olmesartan 40 mg daily - Most recent LDL: 70 09/2023. On statin: yes - Opthalmology up to date: no, advised for yearly check up - Podiatry up to date: no, advised for yearly check up - Patient to call for persistent glucose < 70 or > 300 - Patient counseled on the importance of consistent carbohydrate diet and regular physical activity - Advised patient to continue checking FSG and to bring meter to all visits - Symptoms of hypoglycemia discussed, discussed treatment with 4 ounces of juice and 3-4 glucose tablets. Glucagon prescribed.   - Pump setting no change, no distinct pattern   - Pump all of sudden malfunctioned likely due to out of warranty, CDE working on getting pump to work and talking to tandem. If unable to get pump to work, will need to do basal/bolus  - She will do blood work at PCP office   2. HTN - /98 - On amlodipine and omlesartan and HCTZ 40-25 mg daily  3. HLD - LDL 70 09/2023 at goal, repeat lipid profile wtih PCP  - Continue with rosuvastatin 10 mg daily  4. Vitamin D deficiency - Vitamin D level 31.5 08/2022, repeat vitamin D with PCP  - Continue with vitamin D supplement  5. Obesity - BMI 39 - Discussed eating regular meals to avoid over snacking at bed time - Encourge exercise - Patient inquiring about starting GLP1RA. Started on mounjaro 2.5 mg weekly  - Currently doing well, patient is having slow and steady weight loss. Continue with mounjaro 2.5 mg weekly for now  FOLLOW UP: I recommend that next visit for diabetes care be in 6 months with acp  To do at next clinic visit - Monitor CGM - Weight loss  Jayleen Harris MD Endocrinology, Diabetes and Metabolism [address] [place] [address]. [address] [place] [address] Tel [phone] Fax [phone]

## 2024-08-26 RX ORDER — INSULIN LISPRO 100 U/ML
100 INJECTION, SOLUTION SUBCUTANEOUS
Qty: 3 | Refills: 3 | Status: DISCONTINUED | COMMUNITY
Start: 2024-08-22 | End: 2024-08-26

## 2024-08-26 RX ORDER — SYRINGE,NEEDLE,INSULN,SF 0.5ML 29 G X1/2"
29G X 1/2" SYRINGE, EMPTY DISPOSABLE MISCELLANEOUS
Qty: 3 | Refills: 3 | Status: ACTIVE | COMMUNITY
Start: 2024-08-26 | End: 1900-01-01

## 2024-08-28 ENCOUNTER — RX RENEWAL (OUTPATIENT)
Age: 44
End: 2024-08-28

## 2024-09-13 ENCOUNTER — APPOINTMENT (OUTPATIENT)
Dept: PULMONOLOGY | Facility: CLINIC | Age: 44
End: 2024-09-13
Payer: COMMERCIAL

## 2024-09-13 VITALS
HEIGHT: 62 IN | SYSTOLIC BLOOD PRESSURE: 122 MMHG | RESPIRATION RATE: 16 BRPM | DIASTOLIC BLOOD PRESSURE: 78 MMHG | HEART RATE: 101 BPM | OXYGEN SATURATION: 98 % | BODY MASS INDEX: 39.01 KG/M2 | WEIGHT: 212 LBS

## 2024-09-13 DIAGNOSIS — R06.02 SHORTNESS OF BREATH: ICD-10-CM

## 2024-09-13 DIAGNOSIS — E66.9 OBESITY, UNSPECIFIED: ICD-10-CM

## 2024-09-13 DIAGNOSIS — J30.9 ALLERGIC RHINITIS, UNSPECIFIED: ICD-10-CM

## 2024-09-13 DIAGNOSIS — D84.9 IMMUNODEFICIENCY, UNSPECIFIED: ICD-10-CM

## 2024-09-13 DIAGNOSIS — J45.909 UNSPECIFIED ASTHMA, UNCOMPLICATED: ICD-10-CM

## 2024-09-13 DIAGNOSIS — K21.9 GASTRO-ESOPHAGEAL REFLUX DISEASE W/OUT ESOPHAGITIS: ICD-10-CM

## 2024-09-13 DIAGNOSIS — G47.33 OBSTRUCTIVE SLEEP APNEA (ADULT) (PEDIATRIC): ICD-10-CM

## 2024-09-13 DIAGNOSIS — E55.9 VITAMIN D DEFICIENCY, UNSPECIFIED: ICD-10-CM

## 2024-09-13 PROCEDURE — 94010 BREATHING CAPACITY TEST: CPT

## 2024-09-13 PROCEDURE — 90471 IMMUNIZATION ADMIN: CPT

## 2024-09-13 PROCEDURE — 90715 TDAP VACCINE 7 YRS/> IM: CPT

## 2024-09-13 PROCEDURE — 95012 NITRIC OXIDE EXP GAS DETER: CPT

## 2024-09-13 PROCEDURE — ZZZZZ: CPT

## 2024-09-13 PROCEDURE — 99214 OFFICE O/P EST MOD 30 MIN: CPT | Mod: 25

## 2024-09-13 NOTE — HISTORY OF PRESENT ILLNESS
[FreeTextEntry1] : Ms. Champagne is a 44 year old female presenting to the office today for a follow up visit for allergies, asthma, GERD, and poor sleep. Her chief complaint is weight  -she notes energy levels are good -she notes exercising -she notes seasonal allergies - uses qNasl and Zyrtec -she notes poor quality of sleep - sleep schedule due to children back in school  -she notes regular menstrual cycle  -she notes heartburn/reflux are quiet -she notes improved hand issues  -she denies taking any new medications, vitamins, or supplements   -she denies any headaches, nausea, emesis, fever, chills, sweats, chest pain, chest pressure, coughing, wheezing, palpitations, diarrhea, constipation, dysphagia, vertigo, arthralgias, myalgias, leg swelling, itchy eyes, itchy ears, heartburn, reflux, or sour taste in the mouth.

## 2024-09-13 NOTE — PHYSICAL EXAM
[No Acute Distress] : no acute distress [Normal Oropharynx] : normal oropharynx [III] : Mallampati Class: III [Normal Appearance] : normal appearance [No Neck Mass] : no neck mass [Normal Rate/Rhythm] : normal rate/rhythm [Normal S1, S2] : normal s1, s2 [No Murmurs] : no murmurs [No Resp Distress] : no resp distress [Clear to Auscultation Bilaterally] : clear to auscultation bilaterally [No Abnormalities] : no abnormalities [Benign] : benign [Normal Gait] : normal gait [No Clubbing] : no clubbing [No Cyanosis] : no cyanosis [No Edema] : no edema [FROM] : FROM [Normal Color/ Pigmentation] : normal color/ pigmentation [No Focal Deficits] : no focal deficits [Oriented x3] : oriented x3 [Normal Affect] : normal affect [TextBox_2] : Overweight [TextBox_68] : I:E ratio 1:3; clear

## 2024-09-13 NOTE — ASSESSMENT
[FreeTextEntry1] : Ms. Champagne is a 44 year old female with a history of DM, HLD, heart murmur, OSAS, HTN, asthma, allergies, GERD, anemia, rheumatoid arthritis, presenting to the office for a follow up visit. - improved; vaccinated x 2 Covid 19- s/p influenza sinus issues; Arthritis (both improved)  The patient's shortness of breath is multifactorial due to: -pulmonary disease       -asthma -poor breathing mechanics  -overweight/out of shape -?cardiac disease   problem 1: asthma- controlled -continue Symbicort (160) 2 inhalations BID -continue Incruse 1 inhalation daily -continue to use Singulair 10 mg before bed -continue Ventolin rescue inhaler 2 inhalations before exercise, Q6H  -recommended to cover her nose and mouth in the cold air  -Asthma is  believed to be caused by inherited (genetic) and environmental factor, but its exact cause is unknown. Asthma may be triggered by allergens, lung infections, or irritants in the air. Asthma triggers are different for each person -Inhaler technique reviewed as well as oral hygiene techniques reviewed with patient. Avoidance of cold air, extremes of temperature, rescue inhaler should be used before exercise. Order of medication reviewed with patient. Recommended use of a cool mist humidifier in the bedroom.   problem 2: allergies and sinuses- quiet  -continue Qnasl 1 sniff BID (PA) -continue Olopatadine 0.6% at 1 sniff/nostril BID  -add Olopatadine 0.2% eye drops BD -continue to use OTC Claritin QAM -continue to use nasal saline -continue to use Xyzal 5 mg before bed  -Environmental measures for allergies were encouraged including mattress and pillow cover, air purifier, and environmental controls.   Problem 2A: R/o immune deficiency -s/p blood work: strep pneumonia titers (low), IgG levels (-), quantitative immunoglobulins (-) -Patient received Pneumovax in 7/2020, Prevnar-13 12/2020 -Due to the fact that this pt has had more infections than would be expected and immunological blood work is indicated this would include: IgG subclasses, quantitative immunoglobulins, Strep pneumoniae titers as well as Vitamin D levels. Based on this blood work we will be able to decide where the pt needs additional pneumococcal vaccine either Prevnar 13 or pneumovax. Immunology evaluation will also be potentially indicated.  problem 3: GERD  -continue Omeprazole 40 mg before breakfast -Rule of 2s: avoid eating too much, eating too late, eating too spicy, eating two hours before bed -Things to avoid including overeating, spicy foods, tight clothing, eating within three hours of bed, this list is not all inclusive.  -For treatment of reflux, possible options discussed including diet control, H2 blockers, PPIs, as well as coating motility agents discussed as treatment options. Timing of meals and proximity of last meal to sleep were discussed. If symptoms persist, a formal gastrointestinal evaluation is needed.   Problem 4: anemia -continue to follow up with hematology, endocrinology  problem 5: overweight (Tomas / Tate) - (improved on Mounjaro) - Recommended "10-Day Detox" by Joseluis Bianchi for 2-3 weeks followed by probiotics  -recommended "MUNIQ" OTC supplement  -Weight loss, exercise, and diet control were discussed and are highly encouraged. Treatment options were given such as, aqua therapy, and contacting a nutritionist. Recommended to use the elliptical, stationary bike, less use of treadmill.  Obesity is associated with worsening asthma, shortness of breath, and potential for cardiac disease, diabetes, and other underlying medical conditions.   problem 6: mild sleep apnea -continue to use Oxy-Aid and Breathe Rite strips, Provent and Theravent, Somnifix -recommended to use an oral appliance (Ethel Fan) -based on her fatigue, EDS, snoring, and elevated mallampati class -Discussed the risks/associations with coronary artery disease, atrial fibrillation, arrhythmia, memory loss, issues with concentration, hypertension, nocturia, chronic reflux/Patrick's esophagus some but not all inclusive. Treatment options discussed including CPAP/BiPAP machine, oral appliance, ProVent therapy, Oxy-Aid by [employer], new technologies, or positional sleep.  problem 7: hand pain (quiet) -recommended SPM and quercetin -s/p Carpal tunnel (resolved)  -recommended to have an evaluation with Shaquille Aguilar for hand splints or to have an evaluation for a orthotist  -she can also try OTC wrist splints   Problem 8: Cardiac (Dizziness) -Continue to follow up with Dr. Weinberg.  Problem 9: health maintenance -recommended TDAP -recommended Berberine / Quercetin / SPM -Recommended Philipp Sloan's [employer]retches  -recommended Philipp Sloan's [facility] stretches  -s/p Pfizer COVID 19 vaccine x 4 -s/p influenza vaccine 2023 -recommended strep pneumonia vaccines after age 65: Prevnar-13 vaccine, followed by Pneumo vaccine 23 one year following -recommended early intervention for URIs -recommended regular osteoporosis evaluations -recommended early dermatological evaluations -recommended after the age of 50 to the age of 70, colonoscopy every 5 years   F/U in 4 months She is encouraged to call with any changes, concerns or questions.

## 2024-09-13 NOTE — ADDENDUM
[FreeTextEntry1] : Documented by Jam Deshpande acting as a scribe for Dr. Herber Day on 09/13/2024 All medical record entries made by the Scribe were at my, Dr. Herber Day's, direction and personally dictated by me on 09/13/2024 . I have reviewed the chart and agree that the record accurately reflects my personal performance of the history, physical exam, assessment and plan. I have also personally directed, reviewed, and agree with the discharge instructions.

## 2024-10-31 ENCOUNTER — RX RENEWAL (OUTPATIENT)
Age: 44
End: 2024-10-31

## 2024-10-31 RX ORDER — TIRZEPATIDE 2.5 MG/.5ML
2.5 INJECTION, SOLUTION SUBCUTANEOUS
Qty: 1 | Refills: 3 | Status: ACTIVE | COMMUNITY
Start: 2024-10-31 | End: 1900-01-01

## 2024-11-13 ENCOUNTER — APPOINTMENT (OUTPATIENT)
Dept: CARDIOLOGY | Facility: CLINIC | Age: 44
End: 2024-11-13

## 2024-12-16 ENCOUNTER — TRANSCRIPTION ENCOUNTER (OUTPATIENT)
Age: 44
End: 2024-12-16

## 2024-12-23 ENCOUNTER — NON-APPOINTMENT (OUTPATIENT)
Age: 44
End: 2024-12-23

## 2024-12-26 ENCOUNTER — TRANSCRIPTION ENCOUNTER (OUTPATIENT)
Age: 44
End: 2024-12-26

## 2024-12-26 ENCOUNTER — RX CHANGE (OUTPATIENT)
Age: 44
End: 2024-12-26

## 2024-12-26 RX ORDER — INSULIN GLARGINE 100 [IU]/ML
100 INJECTION, SOLUTION SUBCUTANEOUS
Qty: 2 | Refills: 0 | Status: ACTIVE | COMMUNITY
Start: 1900-01-01 | End: 1900-01-01

## 2025-02-12 ENCOUNTER — NON-APPOINTMENT (OUTPATIENT)
Age: 45
End: 2025-02-12

## 2025-02-12 ENCOUNTER — APPOINTMENT (OUTPATIENT)
Dept: ENDOCRINOLOGY | Facility: CLINIC | Age: 45
End: 2025-02-12
Payer: COMMERCIAL

## 2025-02-12 VITALS
OXYGEN SATURATION: 99 % | HEART RATE: 99 BPM | SYSTOLIC BLOOD PRESSURE: 124 MMHG | DIASTOLIC BLOOD PRESSURE: 84 MMHG | WEIGHT: 192 LBS | HEIGHT: 62 IN | BODY MASS INDEX: 35.33 KG/M2

## 2025-02-12 DIAGNOSIS — E78.5 HYPERLIPIDEMIA, UNSPECIFIED: ICD-10-CM

## 2025-02-12 DIAGNOSIS — E55.9 VITAMIN D DEFICIENCY, UNSPECIFIED: ICD-10-CM

## 2025-02-12 DIAGNOSIS — E10.9 TYPE 1 DIABETES MELLITUS W/OUT COMPLICATIONS: ICD-10-CM

## 2025-02-12 LAB
GLUCOSE BLDC GLUCOMTR-MCNC: 229
HBA1C MFR BLD HPLC: 8.9

## 2025-02-12 PROCEDURE — G2211 COMPLEX E/M VISIT ADD ON: CPT | Mod: NC

## 2025-02-12 PROCEDURE — 82962 GLUCOSE BLOOD TEST: CPT

## 2025-02-12 PROCEDURE — 83036 HEMOGLOBIN GLYCOSYLATED A1C: CPT | Mod: QW

## 2025-02-12 PROCEDURE — 99214 OFFICE O/P EST MOD 30 MIN: CPT

## 2025-02-12 RX ORDER — INSULIN PMP CART,AUT,G6/7,CNTR
EACH SUBCUTANEOUS
Qty: 1 | Refills: 0 | Status: ACTIVE | COMMUNITY
Start: 2025-02-12 | End: 1900-01-01

## 2025-02-12 RX ORDER — INSULIN PMP CART,AUT,G6/7,CNTR
EACH SUBCUTANEOUS
Qty: 6 | Refills: 3 | Status: ACTIVE | COMMUNITY
Start: 2025-02-12 | End: 1900-01-01

## 2025-03-14 ENCOUNTER — APPOINTMENT (OUTPATIENT)
Dept: PULMONOLOGY | Facility: CLINIC | Age: 45
End: 2025-03-14
Payer: COMMERCIAL

## 2025-03-14 VITALS
HEIGHT: 62 IN | OXYGEN SATURATION: 98 % | SYSTOLIC BLOOD PRESSURE: 122 MMHG | DIASTOLIC BLOOD PRESSURE: 86 MMHG | TEMPERATURE: 97.4 F | BODY MASS INDEX: 35.51 KG/M2 | WEIGHT: 193 LBS | RESPIRATION RATE: 16 BRPM | HEART RATE: 110 BPM

## 2025-03-14 DIAGNOSIS — F41.9 ANXIETY DISORDER, UNSPECIFIED: ICD-10-CM

## 2025-03-14 DIAGNOSIS — R06.02 SHORTNESS OF BREATH: ICD-10-CM

## 2025-03-14 DIAGNOSIS — J30.9 ALLERGIC RHINITIS, UNSPECIFIED: ICD-10-CM

## 2025-03-14 DIAGNOSIS — E55.9 VITAMIN D DEFICIENCY, UNSPECIFIED: ICD-10-CM

## 2025-03-14 DIAGNOSIS — K21.9 GASTRO-ESOPHAGEAL REFLUX DISEASE W/OUT ESOPHAGITIS: ICD-10-CM

## 2025-03-14 DIAGNOSIS — R06.83 SNORING: ICD-10-CM

## 2025-03-14 PROCEDURE — 95012 NITRIC OXIDE EXP GAS DETER: CPT

## 2025-03-14 PROCEDURE — ZZZZZ: CPT

## 2025-03-14 PROCEDURE — 94729 DIFFUSING CAPACITY: CPT

## 2025-03-14 PROCEDURE — 94727 GAS DIL/WSHOT DETER LNG VOL: CPT

## 2025-03-14 PROCEDURE — 99214 OFFICE O/P EST MOD 30 MIN: CPT | Mod: 25

## 2025-03-14 PROCEDURE — 94010 BREATHING CAPACITY TEST: CPT

## 2025-03-19 ENCOUNTER — TRANSCRIPTION ENCOUNTER (OUTPATIENT)
Age: 45
End: 2025-03-19

## 2025-03-24 ENCOUNTER — RX RENEWAL (OUTPATIENT)
Age: 45
End: 2025-03-24

## 2025-04-15 ENCOUNTER — APPOINTMENT (OUTPATIENT)
Dept: ENDOCRINOLOGY | Facility: CLINIC | Age: 45
End: 2025-04-15
Payer: COMMERCIAL

## 2025-04-15 VITALS — WEIGHT: 195 LBS | BODY MASS INDEX: 35.67 KG/M2

## 2025-04-15 PROCEDURE — P0019: CPT

## 2025-04-18 ENCOUNTER — NON-APPOINTMENT (OUTPATIENT)
Age: 45
End: 2025-04-18

## 2025-04-25 ENCOUNTER — NON-APPOINTMENT (OUTPATIENT)
Age: 45
End: 2025-04-25

## 2025-05-13 ENCOUNTER — APPOINTMENT (OUTPATIENT)
Dept: CARDIOLOGY | Facility: CLINIC | Age: 45
End: 2025-05-13
Payer: COMMERCIAL

## 2025-05-13 ENCOUNTER — NON-APPOINTMENT (OUTPATIENT)
Age: 45
End: 2025-05-13

## 2025-05-13 VITALS
HEIGHT: 62 IN | HEART RATE: 95 BPM | OXYGEN SATURATION: 98 % | RESPIRATION RATE: 16 BRPM | TEMPERATURE: 97.9 F | WEIGHT: 184 LBS | SYSTOLIC BLOOD PRESSURE: 136 MMHG | DIASTOLIC BLOOD PRESSURE: 86 MMHG | BODY MASS INDEX: 33.86 KG/M2

## 2025-05-13 DIAGNOSIS — I10 ESSENTIAL (PRIMARY) HYPERTENSION: ICD-10-CM

## 2025-05-13 DIAGNOSIS — E78.5 HYPERLIPIDEMIA, UNSPECIFIED: ICD-10-CM

## 2025-05-13 DIAGNOSIS — R01.1 CARDIAC MURMUR, UNSPECIFIED: ICD-10-CM

## 2025-05-13 DIAGNOSIS — R00.2 PALPITATIONS: ICD-10-CM

## 2025-05-13 PROCEDURE — 99213 OFFICE O/P EST LOW 20 MIN: CPT

## 2025-05-13 PROCEDURE — G2211 COMPLEX E/M VISIT ADD ON: CPT | Mod: NC

## 2025-05-16 ENCOUNTER — APPOINTMENT (OUTPATIENT)
Dept: ENDOCRINOLOGY | Facility: CLINIC | Age: 45
End: 2025-05-16

## 2025-05-27 ENCOUNTER — APPOINTMENT (OUTPATIENT)
Dept: ENDOCRINOLOGY | Facility: CLINIC | Age: 45
End: 2025-05-27
Payer: COMMERCIAL

## 2025-05-27 VITALS
DIASTOLIC BLOOD PRESSURE: 80 MMHG | OXYGEN SATURATION: 99 % | HEART RATE: 102 BPM | WEIGHT: 184 LBS | BODY MASS INDEX: 33.86 KG/M2 | HEIGHT: 62 IN | SYSTOLIC BLOOD PRESSURE: 108 MMHG

## 2025-05-27 DIAGNOSIS — E10.9 TYPE 1 DIABETES MELLITUS W/OUT COMPLICATIONS: ICD-10-CM

## 2025-05-27 DIAGNOSIS — Z46.81 ENCOUNTER FOR FITTING AND ADJUSTMENT OF INSULIN PUMP: ICD-10-CM

## 2025-05-27 DIAGNOSIS — Z96.41 PRESENCE OF INSULIN PUMP (EXTERNAL) (INTERNAL): ICD-10-CM

## 2025-05-27 DIAGNOSIS — E78.5 HYPERLIPIDEMIA, UNSPECIFIED: ICD-10-CM

## 2025-05-27 DIAGNOSIS — E55.9 VITAMIN D DEFICIENCY, UNSPECIFIED: ICD-10-CM

## 2025-05-27 LAB
GLUCOSE BLDC GLUCOMTR-MCNC: 187
HBA1C MFR BLD HPLC: 6.9

## 2025-05-27 PROCEDURE — 83036 HEMOGLOBIN GLYCOSYLATED A1C: CPT | Mod: QW

## 2025-05-27 PROCEDURE — 99214 OFFICE O/P EST MOD 30 MIN: CPT

## 2025-05-27 PROCEDURE — 95251 CONT GLUC MNTR ANALYSIS I&R: CPT

## 2025-05-27 PROCEDURE — 82962 GLUCOSE BLOOD TEST: CPT

## 2025-05-27 PROCEDURE — G2211 COMPLEX E/M VISIT ADD ON: CPT | Mod: NC

## 2025-06-21 ENCOUNTER — RX RENEWAL (OUTPATIENT)
Age: 45
End: 2025-06-21

## 2025-06-22 RX ORDER — OLMESARTAN MEDOXOMIL 40 MG/1
40 TABLET, FILM COATED ORAL
Qty: 90 | Refills: 1 | Status: ACTIVE | COMMUNITY
Start: 2025-06-19 | End: 1900-01-01

## 2025-07-01 ENCOUNTER — RX CHANGE (OUTPATIENT)
Age: 45
End: 2025-07-01

## 2025-07-01 RX ORDER — AMLODIPINE BESYLATE 5 MG/1
5 TABLET ORAL DAILY
Qty: 90 | Refills: 1 | Status: ACTIVE | COMMUNITY
Start: 1900-01-01 | End: 1900-01-01

## 2025-07-21 ENCOUNTER — RX RENEWAL (OUTPATIENT)
Age: 45
End: 2025-07-21

## 2025-08-19 ENCOUNTER — APPOINTMENT (OUTPATIENT)
Dept: CARDIOLOGY | Facility: CLINIC | Age: 45
End: 2025-08-19

## 2025-09-11 ENCOUNTER — APPOINTMENT (OUTPATIENT)
Dept: ENDOCRINOLOGY | Facility: CLINIC | Age: 45
End: 2025-09-11